# Patient Record
Sex: MALE | Race: WHITE | NOT HISPANIC OR LATINO | Employment: OTHER | URBAN - METROPOLITAN AREA
[De-identification: names, ages, dates, MRNs, and addresses within clinical notes are randomized per-mention and may not be internally consistent; named-entity substitution may affect disease eponyms.]

---

## 2020-09-16 ENCOUNTER — HOSPITAL ENCOUNTER (INPATIENT)
Facility: CLINIC | Age: 70
LOS: 8 days | Discharge: NURSING, REHAB OR LONG-TERM CARE | DRG: 057 | End: 2020-09-25
Attending: FAMILY MEDICINE | Admitting: PSYCHIATRY & NEUROLOGY
Payer: COMMERCIAL

## 2020-09-16 ENCOUNTER — APPOINTMENT (OUTPATIENT)
Dept: GENERAL RADIOLOGY | Facility: CLINIC | Age: 70
DRG: 057 | End: 2020-09-16
Payer: COMMERCIAL

## 2020-09-16 ENCOUNTER — APPOINTMENT (OUTPATIENT)
Dept: MRI IMAGING | Facility: CLINIC | Age: 70
DRG: 057 | End: 2020-09-16
Payer: COMMERCIAL

## 2020-09-16 DIAGNOSIS — R53.1 WEAKNESS: ICD-10-CM

## 2020-09-16 DIAGNOSIS — G95.89 MYELOMALACIA OF CERVICAL CORD (H): ICD-10-CM

## 2020-09-16 DIAGNOSIS — R29.898 UPPER EXTREMITY WEAKNESS: ICD-10-CM

## 2020-09-16 DIAGNOSIS — G12.21 ALS (AMYOTROPHIC LATERAL SCLEROSIS) (H): Primary | ICD-10-CM

## 2020-09-16 DIAGNOSIS — E11.9 TYPE 2 DIABETES MELLITUS WITHOUT COMPLICATION, WITHOUT LONG-TERM CURRENT USE OF INSULIN (H): ICD-10-CM

## 2020-09-16 DIAGNOSIS — I10 BENIGN ESSENTIAL HYPERTENSION: ICD-10-CM

## 2020-09-16 DIAGNOSIS — Z20.828 CONTACT WITH AND (SUSPECTED) EXPOSURE TO OTHER VIRAL COMMUNICABLE DISEASES: ICD-10-CM

## 2020-09-16 DIAGNOSIS — G47.33 OSA (OBSTRUCTIVE SLEEP APNEA): ICD-10-CM

## 2020-09-16 LAB
ALBUMIN SERPL-MCNC: 3.4 G/DL (ref 3.4–5)
ALP SERPL-CCNC: 83 U/L (ref 40–150)
ALT SERPL W P-5'-P-CCNC: 38 U/L (ref 0–70)
ANION GAP SERPL CALCULATED.3IONS-SCNC: 7 MMOL/L (ref 3–14)
APTT PPP: 27 SEC (ref 22–37)
AST SERPL W P-5'-P-CCNC: 35 U/L (ref 0–45)
BASOPHILS # BLD AUTO: 0.1 10E9/L (ref 0–0.2)
BASOPHILS NFR BLD AUTO: 0.5 %
BILIRUB SERPL-MCNC: 0.6 MG/DL (ref 0.2–1.3)
BUN SERPL-MCNC: 9 MG/DL (ref 7–30)
CALCIUM SERPL-MCNC: 8.8 MG/DL (ref 8.5–10.1)
CHLORIDE SERPL-SCNC: 106 MMOL/L (ref 94–109)
CK SERPL-CCNC: 878 U/L (ref 30–300)
CO2 SERPL-SCNC: 28 MMOL/L (ref 20–32)
CREAT SERPL-MCNC: 0.64 MG/DL (ref 0.66–1.25)
CRP SERPL-MCNC: 3.5 MG/L (ref 0–8)
DIFFERENTIAL METHOD BLD: ABNORMAL
EOSINOPHIL # BLD AUTO: 0.2 10E9/L (ref 0–0.7)
EOSINOPHIL NFR BLD AUTO: 2.1 %
ERYTHROCYTE [DISTWIDTH] IN BLOOD BY AUTOMATED COUNT: 14.2 % (ref 10–15)
ERYTHROCYTE [SEDIMENTATION RATE] IN BLOOD BY WESTERGREN METHOD: 5 MM/H (ref 0–20)
FOLATE SERPL-MCNC: 11.5 NG/ML
GFR SERPL CREATININE-BSD FRML MDRD: >90 ML/MIN/{1.73_M2}
GLUCOSE SERPL-MCNC: 110 MG/DL (ref 70–99)
HCT VFR BLD AUTO: 46.9 % (ref 40–53)
HGB BLD-MCNC: 17.1 G/DL (ref 13.3–17.7)
IMM GRANULOCYTES # BLD: 0.1 10E9/L (ref 0–0.4)
IMM GRANULOCYTES NFR BLD: 0.5 %
INR PPP: 1.1 (ref 0.86–1.14)
LYMPHOCYTES # BLD AUTO: 1.9 10E9/L (ref 0.8–5.3)
LYMPHOCYTES NFR BLD AUTO: 18.7 %
MAGNESIUM SERPL-MCNC: 2.5 MG/DL (ref 1.6–2.3)
MCH RBC QN AUTO: 31.8 PG (ref 26.5–33)
MCHC RBC AUTO-ENTMCNC: 36.5 G/DL (ref 31.5–36.5)
MCV RBC AUTO: 87 FL (ref 78–100)
MONOCYTES # BLD AUTO: 1.4 10E9/L (ref 0–1.3)
MONOCYTES NFR BLD AUTO: 13.2 %
NEUTROPHILS # BLD AUTO: 6.8 10E9/L (ref 1.6–8.3)
NEUTROPHILS NFR BLD AUTO: 65 %
NRBC # BLD AUTO: 0 10*3/UL
NRBC BLD AUTO-RTO: 0 /100
NT-PROBNP SERPL-MCNC: 64 PG/ML (ref 0–900)
PHOSPHATE SERPL-MCNC: 3.4 MG/DL (ref 2.5–4.5)
PLATELET # BLD AUTO: 207 10E9/L (ref 150–450)
POTASSIUM SERPL-SCNC: 3.1 MMOL/L (ref 3.4–5.3)
PROT SERPL-MCNC: 7.5 G/DL (ref 6.8–8.8)
RBC # BLD AUTO: 5.38 10E12/L (ref 4.4–5.9)
SODIUM SERPL-SCNC: 141 MMOL/L (ref 133–144)
T4 FREE SERPL-MCNC: 0.87 NG/DL (ref 0.76–1.46)
TROPONIN I SERPL-MCNC: 0.04 UG/L (ref 0–0.04)
TSH SERPL DL<=0.005 MIU/L-ACNC: 2.69 MU/L (ref 0.4–4)
VIT B12 SERPL-MCNC: 451 PG/ML (ref 193–986)
WBC # BLD AUTO: 10.4 10E9/L (ref 4–11)

## 2020-09-16 PROCEDURE — A9585 GADOBUTROL INJECTION: HCPCS | Performed by: FAMILY MEDICINE

## 2020-09-16 PROCEDURE — 86780 TREPONEMA PALLIDUM: CPT | Performed by: STUDENT IN AN ORGANIZED HEALTH CARE EDUCATION/TRAINING PROGRAM

## 2020-09-16 PROCEDURE — 25500064 ZZH RX 255 OP 636: Performed by: FAMILY MEDICINE

## 2020-09-16 PROCEDURE — 84439 ASSAY OF FREE THYROXINE: CPT | Performed by: STUDENT IN AN ORGANIZED HEALTH CARE EDUCATION/TRAINING PROGRAM

## 2020-09-16 PROCEDURE — 93005 ELECTROCARDIOGRAM TRACING: CPT | Performed by: FAMILY MEDICINE

## 2020-09-16 PROCEDURE — C9803 HOPD COVID-19 SPEC COLLECT: HCPCS | Performed by: FAMILY MEDICINE

## 2020-09-16 PROCEDURE — 99285 EMERGENCY DEPT VISIT HI MDM: CPT | Mod: GC | Performed by: FAMILY MEDICINE

## 2020-09-16 PROCEDURE — 82607 VITAMIN B-12: CPT | Performed by: STUDENT IN AN ORGANIZED HEALTH CARE EDUCATION/TRAINING PROGRAM

## 2020-09-16 PROCEDURE — 83880 ASSAY OF NATRIURETIC PEPTIDE: CPT | Performed by: STUDENT IN AN ORGANIZED HEALTH CARE EDUCATION/TRAINING PROGRAM

## 2020-09-16 PROCEDURE — 85025 COMPLETE CBC W/AUTO DIFF WBC: CPT | Performed by: STUDENT IN AN ORGANIZED HEALTH CARE EDUCATION/TRAINING PROGRAM

## 2020-09-16 PROCEDURE — 85610 PROTHROMBIN TIME: CPT | Performed by: STUDENT IN AN ORGANIZED HEALTH CARE EDUCATION/TRAINING PROGRAM

## 2020-09-16 PROCEDURE — 84443 ASSAY THYROID STIM HORMONE: CPT | Performed by: STUDENT IN AN ORGANIZED HEALTH CARE EDUCATION/TRAINING PROGRAM

## 2020-09-16 PROCEDURE — 82550 ASSAY OF CK (CPK): CPT | Performed by: STUDENT IN AN ORGANIZED HEALTH CARE EDUCATION/TRAINING PROGRAM

## 2020-09-16 PROCEDURE — 84100 ASSAY OF PHOSPHORUS: CPT | Performed by: STUDENT IN AN ORGANIZED HEALTH CARE EDUCATION/TRAINING PROGRAM

## 2020-09-16 PROCEDURE — 86140 C-REACTIVE PROTEIN: CPT | Performed by: STUDENT IN AN ORGANIZED HEALTH CARE EDUCATION/TRAINING PROGRAM

## 2020-09-16 PROCEDURE — 83735 ASSAY OF MAGNESIUM: CPT | Performed by: STUDENT IN AN ORGANIZED HEALTH CARE EDUCATION/TRAINING PROGRAM

## 2020-09-16 PROCEDURE — 71045 X-RAY EXAM CHEST 1 VIEW: CPT

## 2020-09-16 PROCEDURE — 85730 THROMBOPLASTIN TIME PARTIAL: CPT | Performed by: STUDENT IN AN ORGANIZED HEALTH CARE EDUCATION/TRAINING PROGRAM

## 2020-09-16 PROCEDURE — 84484 ASSAY OF TROPONIN QUANT: CPT | Performed by: STUDENT IN AN ORGANIZED HEALTH CARE EDUCATION/TRAINING PROGRAM

## 2020-09-16 PROCEDURE — 82746 ASSAY OF FOLIC ACID SERUM: CPT | Performed by: STUDENT IN AN ORGANIZED HEALTH CARE EDUCATION/TRAINING PROGRAM

## 2020-09-16 PROCEDURE — 72141 MRI NECK SPINE W/O DYE: CPT

## 2020-09-16 PROCEDURE — 85652 RBC SED RATE AUTOMATED: CPT | Performed by: STUDENT IN AN ORGANIZED HEALTH CARE EDUCATION/TRAINING PROGRAM

## 2020-09-16 PROCEDURE — 80053 COMPREHEN METABOLIC PANEL: CPT | Performed by: STUDENT IN AN ORGANIZED HEALTH CARE EDUCATION/TRAINING PROGRAM

## 2020-09-16 PROCEDURE — 70553 MRI BRAIN STEM W/O & W/DYE: CPT

## 2020-09-16 PROCEDURE — 99285 EMERGENCY DEPT VISIT HI MDM: CPT | Mod: 25 | Performed by: FAMILY MEDICINE

## 2020-09-16 RX ORDER — LIDOCAINE 40 MG/G
CREAM TOPICAL
Status: DISCONTINUED | OUTPATIENT
Start: 2020-09-16 | End: 2020-09-17

## 2020-09-16 RX ORDER — GADOBUTROL 604.72 MG/ML
10 INJECTION INTRAVENOUS ONCE
Status: COMPLETED | OUTPATIENT
Start: 2020-09-16 | End: 2020-09-16

## 2020-09-16 RX ORDER — LISINOPRIL AND HYDROCHLOROTHIAZIDE 12.5; 2 MG/1; MG/1
1 TABLET ORAL
Status: ON HOLD | COMMUNITY
Start: 2019-07-08 | End: 2020-09-25

## 2020-09-16 RX ADMIN — GADOBUTROL 10 ML: 604.72 INJECTION INTRAVENOUS at 18:45

## 2020-09-16 ASSESSMENT — ENCOUNTER SYMPTOMS
ARTHRALGIAS: 0
DIFFICULTY URINATING: 0
WEAKNESS: 1
FEVER: 0
BACK PAIN: 0
DIZZINESS: 0
NUMBNESS: 0
HEADACHES: 0
MYALGIAS: 0
CHEST TIGHTNESS: 0
NECK STIFFNESS: 0
TROUBLE SWALLOWING: 0
TREMORS: 0
LIGHT-HEADEDNESS: 0
NECK PAIN: 0
SHORTNESS OF BREATH: 1
VOICE CHANGE: 0
DIARRHEA: 0
CHILLS: 0
ABDOMINAL PAIN: 0
JOINT SWELLING: 0
CONSTIPATION: 0

## 2020-09-16 NOTE — ED PROVIDER NOTES
ED Provider Note  [facility]      History     Chief Complaint   Patient presents with     Musculoskeletal Problem     unable to move BUEs progressing over past 2 years; R weaker than L, edema noted BUE. Hand  weak, unable to flex elbows. Pt says he was eval'd for this once and told it is arthritis.     HPI  John C Dunphy is a 69 year old male who presents with progressive bilateral upper extremity weakness.    Weakness started 2 years ago in right upper extremity. Per chart review, patient was evaluated for right arm weakness 09/08/2018.  X-ray shoulder at that time with mild degenerative changes.     Today, patient reports that weakness has become progressively worse. He is now unable to perform ADLs. Patient does not wear clothing at home because it is too difficult to get dressed. He does not bathe because he can't use his arms to wash himself and it is too difficult to get out of the bathtub. He cannot use his hands to eat or prepare food and will eat by bringing his face to the table. He has been quite resourceful in accommodating for the loss of hand/arm use over the past 2 years and presents today because his neighbor was concerned about his ability to continue caring for himself. He generally does not leave his home other than to get the mail, and has not seen a primary care physician since his insurance changed over a year ago.    Patient reports that he has never had any numbness, tingling, pain or loss of sensation. He does note swelling in his upper extremities. He denies any weakness in lower extremities. He has no difficulty controlling his bowel or bladder. He denies difficulty swallowing or changes in voice quality. He has felt SOB recently but notes that this may be secondary to inactivity. He denies tremor or fasciculations. No history of neck injury or trauma.  No known family history of motor neuron disease.     Past Medical History  Past Medical History:  "  Diagnosis Date     Hypertension      History reviewed. No pertinent surgical history.  No current outpatient medications on file.    No Known Allergies  Family History  History reviewed. No pertinent family history.  Social History   Social History     Tobacco Use     Smoking status: None   Substance Use Topics     Alcohol use: None     Drug use: None      Past medical history, past surgical history, medications, allergies, family history, and social history were reviewed with the patient. No additional pertinent items.       Review of Systems   Constitutional: Positive for activity change (unable to perform simple ADLs). Negative for chills and fever.   HENT: Negative for trouble swallowing and voice change.    Eyes: Negative for visual disturbance.   Respiratory: Positive for shortness of breath. Negative for chest tightness.    Cardiovascular: Positive for leg swelling. Negative for chest pain.   Gastrointestinal: Negative for abdominal pain, constipation, diarrhea and nausea.   Genitourinary: Negative for difficulty urinating and flank pain.   Musculoskeletal: Negative for arthralgias, back pain, gait problem, joint swelling, myalgias, neck pain and neck stiffness.   Skin: Negative for rash.   Allergic/Immunologic: Negative for immunocompromised state.   Neurological: Positive for weakness. Negative for dizziness, tremors, light-headedness, numbness and headaches.   Hematological: Does not bruise/bleed easily.   Psychiatric/Behavioral: Positive for decreased concentration. Negative for confusion.   All other systems reviewed and are negative.    A complete review of systems was performed with pertinent positives and negatives noted in the HPI, and all other systems negative.    Physical Exam   BP: (!) 154/98  Pulse: 89  Temp: 97.7  F (36.5  C)  Resp: 16  Height: 167.6 cm (5' 6\")  Weight: 98.4 kg (216 lb 14.4 oz)  SpO2: 95 %  Physical Exam  Vitals signs and nursing note reviewed.   Constitutional:       " General: He is in acute distress.      Appearance: He is well-developed. He is not toxic-appearing or diaphoretic.   HENT:      Head: Normocephalic and atraumatic.      Mouth/Throat:      Mouth: Mucous membranes are moist.   Eyes:      General: No scleral icterus.     Extraocular Movements: Extraocular movements intact.      Conjunctiva/sclera: Conjunctivae normal.      Pupils: Pupils are equal, round, and reactive to light.   Neck:      Musculoskeletal: Normal range of motion and neck supple. No neck rigidity or muscular tenderness.      Comments: nontenderness    Cardiovascular:      Rate and Rhythm: Normal rate and regular rhythm.      Pulses: Normal pulses.      Heart sounds: Normal heart sounds.   Pulmonary:      Effort: Pulmonary effort is normal. No respiratory distress.      Breath sounds: Normal breath sounds. No stridor. No rales.   Abdominal:      General: There is no distension.      Tenderness: There is no abdominal tenderness.   Musculoskeletal:         General: Swelling present. No tenderness.      Right shoulder: He exhibits decreased range of motion, swelling and decreased strength. He exhibits no tenderness and no pain.      Right lower leg: Edema present.      Left lower leg: Edema present.      Comments: Arms swollen bilaterally. R>L.   Skin:     General: Skin is warm and dry.      Capillary Refill: Capillary refill takes less than 2 seconds.      Coloration: Skin is pale.      Findings: No rash.   Neurological:      Mental Status: He is alert and oriented to person, place, and time.      Cranial Nerves: Cranial nerves are intact.      Sensory: Sensory deficit present.      Motor: Weakness present.      Coordination: Coordination normal.      Gait: Gait normal.      Deep Tendon Reflexes: Reflexes abnormal. Babinski sign absent on the right side. Babinski sign absent on the left side.      Comments: Strength 2/5 bilaterally on arm abduction. 5/5 bilaterally shoulder shrug. Unable to flex elbows.  Unable to extend right wrist. Unable to flex left wrist. Muscle tone increased and limits passive range of motion of shoulders bilaterally. Unable to elicit reflexes. Vibratory sensation diminished below the knee bilaterally. Sensation to pain intact and equal in all 4 extremities.   Psychiatric:      Comments: Flat anxious but appropriate       ED Course      Procedures         Patient value in the ER.  As noted right greater than left upper extremity weakness patient has no neck pain or back pain no bowel or bladder problems etc.  His gait is still intact.  Labs noted  EKG done with some inf lateral st changes.  Trop wnl and bnp etc.    Other markers stable.  Patient transferred to [place] ED for neuro consult and possible neurosurgery consult based on prelim mri report.           Results for orders placed or performed during the hospital encounter of 09/16/20   XR Chest Port 1 View     Status: None    Narrative    CHEST PORTABLE ONE VIEW   9/16/2020 6:11 PM     HISTORY: Short of breath.    COMPARISON: None.      Impression    IMPRESSION: Portable chest. Lungs are clear. Heart is normal in size.  No pneumothorax. No definite pleural effusions.    JUN MONTERO MD   MR Brain w/o & w Contrast     Status: None    Narrative    MRI BRAIN WITHOUT and with IV contrast. CONTRAST  9/16/2020 7:35 PM    HISTORY:  Neuro deficit(s), subacute    TECHNIQUE:  Multiplanar, multisequence MRI of the brain with 7.5 cc  gadolinium IV contrast material.    COMPARISON:  None.    FINDINGS:  There is generalized atrophy of the brain. White matter  changes are seen in the cerebral hemispheres consistent with sequelae  of small vessel ischemic disease. There is no evidence of hemorrhage,  mass, acute infarct, or anomaly.     The facial structures appear normal. The arteries at the base of the  brain and the dural venous sinuses appear patent. Paranasal sinus  mucosal thickening. No abnormal enhancement of the brain parenchyma  or  meninges.  Inflammatory fluid throughout several left-sided mastoid air cells.  Region of the sella and suprasellar cistern are clear.      Impression    IMPRESSION:    1. Evidence of chronic small vessel change throughout the deep white  matter of both cerebral hemispheres with no acute intracranial  findings.      LAMONTE BRYAN MD   MR Cervical Spine w/o Contrast     Status: None    Narrative    MRI OF THE CERVICAL SPINE WITHOUT CONTRAST 9/16/2020 7:13 PM    COMPARISON: None    HISTORY: Progressive bilateral upper extremity weakness.    TECHNIQUE: Multiplanar, multisequence MRI images of the cervical spine  were acquired without intravenous contrast.    FINDINGS: There is normal alignment of the cervical vertebrae;  however, there is straightening of normal cervical lordosis. Vertebral  body heights of the cervical spine are normal. Marrow signal  throughout the cervical vertebrae is within normal limits. There is no  evidence for fracture or pathologic bony lesion of the cervical spine.      There is loss of disc height, disc desiccation and posterior disc  bulging to varying degrees at all levels of the cervical spine.      The cervical spinal cord is mildly deformed by degenerative changes at  the C4-C5 level. The cervical spinal cord is otherwise normal in  contour. There is abnormal T2 signal hyperintensity in the cervical  spinal cord at the C4-C5 level likely representing myelomalacia due to  chronic impingement. There is no other abnormal signal in the cervical  spinal cord.    Level by level:    C2-C3: There is a small posterior central disc herniation  (protrusion). Minimal facet arthropathy bilaterally. The herniated  disc material mildly indents the anterior central aspect of the  cervical spinal cord, but overall spinal canal narrowing is minimal.  There is no foraminal narrowing on either side.    C3-C4: There is facet arthropathy bilaterally, uncinate hypertrophy  bilaterally and a posterior  broad-based disc-osteophyte complex with a  superimposed moderate-sized posterior central disc herniation  (protrusion). The herniated disc material mildly indents the anterior  central aspect of the cervical spinal cord. Overall spinal canal  narrowing is mild. Moderate left foraminal stenosis. Mild right  foraminal narrowing.    C4-C5: There is facet arthropathy bilaterally, uncinate hypertrophy  bilaterally and a posterior broad-based disc-osteophyte complex with a  superimposed moderate-sized posterior central disc herniation  (protrusion). The herniated disc material moderately indents the  anterior central aspect of the cervical spinal cord. Abnormal T2  signal hyperintensity consistent with myelomalacia is noted in the  right anterolateral aspect of the cervical spinal cord. Moderate  spinal canal stenosis. Moderate bilateral neural foraminal stenosis.    C5-C6: There is facet arthropathy bilaterally, uncinate hypertrophy  bilaterally and a posterior broad-based disc-osteophyte complex.  Minimal spinal canal narrowing. Moderate left foraminal stenosis.  Minimal right foraminal narrowing.    C6-C7: There is facet arthropathy bilaterally, uncinate hypertrophy on  the left and a posterior broad-based disc-osteophyte complex. Minimal  spinal canal narrowing. Severe left foraminal stenosis. No right  foraminal stenosis.    C7-T1: There is facet arthropathy bilaterally, uncinate hypertrophy on  the left and a posterior broad-based disc-osteophyte complex. There is  no spinal canal or neural foraminal stenosis at this level.      Impression    IMPRESSION:  1. Severe degenerative changes of the cervical spine as detailed  above.  2. Deformation of the spinal cord by posterior disc herniations at the  C2-C3, C3-C4 and C4-C5 levels as detailed above. Small focus of  presumed myelomalacia in the right anterolateral aspect of the  cervical spinal cord at the C4-C5 level likely due to chronic  impingement.  3. Moderate to  severe neural foraminal stenosis on the left at C3-C4,  bilaterally at C4-C5, on the left at C5-C6 and on the left at C6-C7.    NICHOLAS JOHN MD   CBC with platelets differential     Status: Abnormal   Result Value Ref Range    WBC 10.4 4.0 - 11.0 10e9/L    RBC Count 5.38 4.4 - 5.9 10e12/L    Hemoglobin 17.1 13.3 - 17.7 g/dL    Hematocrit 46.9 40.0 - 53.0 %    MCV 87 78 - 100 fl    MCH 31.8 26.5 - 33.0 pg    MCHC 36.5 31.5 - 36.5 g/dL    RDW 14.2 10.0 - 15.0 %    Platelet Count 207 150 - 450 10e9/L    Diff Method Automated Method     % Neutrophils 65.0 %    % Lymphocytes 18.7 %    % Monocytes 13.2 %    % Eosinophils 2.1 %    % Basophils 0.5 %    % Immature Granulocytes 0.5 %    Nucleated RBCs 0 0 /100    Absolute Neutrophil 6.8 1.6 - 8.3 10e9/L    Absolute Lymphocytes 1.9 0.8 - 5.3 10e9/L    Absolute Monocytes 1.4 (H) 0.0 - 1.3 10e9/L    Absolute Eosinophils 0.2 0.0 - 0.7 10e9/L    Absolute Basophils 0.1 0.0 - 0.2 10e9/L    Abs Immature Granulocytes 0.1 0 - 0.4 10e9/L    Absolute Nucleated RBC 0.0    Erythrocyte sedimentation rate auto     Status: None   Result Value Ref Range    Sed Rate 5 0 - 20 mm/h   CRP inflammation     Status: None   Result Value Ref Range    CRP Inflammation 3.5 0.0 - 8.0 mg/L   INR     Status: None   Result Value Ref Range    INR 1.10 0.86 - 1.14   Partial thromboplastin time     Status: None   Result Value Ref Range    PTT 27 22 - 37 sec   Comprehensive metabolic panel     Status: Abnormal   Result Value Ref Range    Sodium 141 133 - 144 mmol/L    Potassium 3.1 (L) 3.4 - 5.3 mmol/L    Chloride 106 94 - 109 mmol/L    Carbon Dioxide 28 20 - 32 mmol/L    Anion Gap 7 3 - 14 mmol/L    Glucose 110 (H) 70 - 99 mg/dL    Urea Nitrogen 9 7 - 30 mg/dL    Creatinine 0.64 (L) 0.66 - 1.25 mg/dL    GFR Estimate >90 >60 mL/min/[1.73_m2]    GFR Estimate If Black >90 >60 mL/min/[1.73_m2]    Calcium 8.8 8.5 - 10.1 mg/dL    Bilirubin Total 0.6 0.2 - 1.3 mg/dL    Albumin 3.4 3.4 - 5.0 g/dL    Protein Total 7.5  6.8 - 8.8 g/dL    Alkaline Phosphatase 83 40 - 150 U/L    ALT 38 0 - 70 U/L    AST 35 0 - 45 U/L   Magnesium     Status: Abnormal   Result Value Ref Range    Magnesium 2.5 (H) 1.6 - 2.3 mg/dL   Phosphorus     Status: None   Result Value Ref Range    Phosphorus 3.4 2.5 - 4.5 mg/dL   Troponin I     Status: None   Result Value Ref Range    Troponin I ES 0.045 0.000 - 0.045 ug/L   Nt probnp inpatient (BNP)     Status: None   Result Value Ref Range    N-Terminal Pro BNP Inpatient 64 0 - 900 pg/mL   TSH     Status: None   Result Value Ref Range    TSH 2.69 0.40 - 4.00 mU/L   CK total     Status: Abnormal   Result Value Ref Range    CK Total 878 (H) 30 - 300 U/L   Folate     Status: None   Result Value Ref Range    Folate 11.5 >5.4 ng/mL   T4 free     Status: None   Result Value Ref Range    T4 Free 0.87 0.76 - 1.46 ng/dL   Treponema Abs w Reflex to RPR and Titer     Status: None   Result Value Ref Range    Treponema Antibodies Nonreactive NR^Nonreactive   Vitamin B12     Status: None   Result Value Ref Range    Vitamin B12 451 193 - 986 pg/mL   UA with Microscopic     Status: Abnormal   Result Value Ref Range    Color Urine Yellow     Appearance Urine Clear     Glucose Urine Negative NEG^Negative mg/dL    Bilirubin Urine Negative NEG^Negative    Ketones Urine Negative NEG^Negative mg/dL    Specific Gravity Urine 1.025 1.003 - 1.035    Blood Urine Negative NEG^Negative    pH Urine 6.0 5.0 - 7.0 pH    Protein Albumin Urine 10 (A) NEG^Negative mg/dL    Urobilinogen mg/dL 2.0 0.0 - 2.0 mg/dL    Nitrite Urine Negative NEG^Negative    Leukocyte Esterase Urine Negative NEG^Negative    Source Clean catch urine     WBC Urine 1 0 - 5 /HPF    RBC Urine 1 0 - 2 /HPF    Transitional Epi <1 0 - 1 /HPF    Mucous Urine Present (A) NEG^Negative /LPF   EKG 12-lead, tracing only     Status: None   Result Value Ref Range    Interpretation ECG Click View Image link to view waveform and result      Medications   lisinopril-hydrochlorothiazide  (ZESTORETIC) 20-12.5 mg combo dose ( Oral Given 9/17/20 6440)   potassium chloride ER (KLOR-CON M) CR tablet 20 mEq (has no administration in time range)   lidocaine 1 % 0.1-1 mL (has no administration in time range)   lidocaine (LMX4) cream (has no administration in time range)   sodium chloride (PF) 0.9% PF flush 3 mL (has no administration in time range)   sodium chloride (PF) 0.9% PF flush 3 mL (3 mLs Intracatheter Not Given 9/17/20 0358)   acetaminophen (TYLENOL) tablet 650 mg (has no administration in time range)   ondansetron (ZOFRAN-ODT) ODT tab 4 mg (has no administration in time range)     Or   ondansetron (ZOFRAN) injection 4 mg (has no administration in time range)   enoxaparin ANTICOAGULANT (LOVENOX) injection 40 mg (has no administration in time range)   gadobutrol (GADAVIST) injection 10 mL (10 mLs Intravenous Given 9/16/20 3415)   0.9% sodium chloride BOLUS (1,000 mLs Intravenous New Bag 9/17/20 0242)        Assessments & Plan (with Medical Decision Making)   John C Dunphy is a 69 year old male who presents with progressive bilateral upper extremity weakness. Patient's symptoms appear to be entirely motor. He denies any pain, numbness, tingling. Vital signs unconcerning. Physical exam notable for upper extremity weakness. Laboratory workup notable for elevated CK. Inflammatory markers unremarkable. Spoke with neurology who agreed that patient would benefit from hospital admission and neurology evaluation as he is quite debilitated. Also recommended MRI brain, MRI cervical spine. Patient received both prior to transfer. MRI spine with severe degenerative changes of the cervical spine, deformation of the spinal cord by disc herniations. Given this, patient may also benefit from neurosurgery evaluation.    I have reviewed the nursing notes. I have reviewed the findings, diagnosis, plan and need for follow up with the patient.    Current Discharge Medication List          Final diagnoses:   Myelomalacia  of cervical cord (H)   Upper extremity weakness     Shannon Maria MD  --  Sanjay Simmons  This data collected with the Resident working in the Emergency Department.  Patient was seen and evaluated by myself and I repeated the history and physical exam with the patient.  The plan of care was discussed with them.  The key portions of the note including the entire assessment and plan reflect my documentation.      This note was created at least in part by the use of dragon voice dictation system. Inadvertent typographical errors may still exist.  Sanjay Simmons MD.    Patient evaluated in the emergency department during the COVID-19 pandemic period. Careful attention to patients safety was addressed throughout the evaluation. Evaluation and treatment management was initiated with disposition made efficiently and appropriate as possible to minimize any risk of potential exposure to patient during this evaluation.    [facility], [place], EMERGENCY DEPARTMENT  9/16/2020     Sanjay Simmons MD  09/17/20 1107

## 2020-09-17 ENCOUNTER — APPOINTMENT (OUTPATIENT)
Dept: SPEECH THERAPY | Facility: CLINIC | Age: 70
DRG: 057 | End: 2020-09-17
Attending: COUNSELOR
Payer: COMMERCIAL

## 2020-09-17 ENCOUNTER — APPOINTMENT (OUTPATIENT)
Dept: OCCUPATIONAL THERAPY | Facility: CLINIC | Age: 70
DRG: 057 | End: 2020-09-17
Attending: STUDENT IN AN ORGANIZED HEALTH CARE EDUCATION/TRAINING PROGRAM
Payer: COMMERCIAL

## 2020-09-17 PROBLEM — R53.1 WEAKNESS: Status: ACTIVE | Noted: 2020-09-17

## 2020-09-17 LAB
ALBUMIN UR-MCNC: 10 MG/DL
APPEARANCE UR: CLEAR
BILIRUB UR QL STRIP: NEGATIVE
COLOR UR AUTO: YELLOW
GLUCOSE UR STRIP-MCNC: NEGATIVE MG/DL
HGB UR QL STRIP: NEGATIVE
INR PPP: 1.09 (ref 0.86–1.14)
INTERPRETATION ECG - MUSE: NORMAL
KETONES UR STRIP-MCNC: NEGATIVE MG/DL
LEUKOCYTE ESTERASE UR QL STRIP: NEGATIVE
MUCOUS THREADS #/AREA URNS LPF: PRESENT /LPF
NITRATE UR QL: NEGATIVE
PH UR STRIP: 6 PH (ref 5–7)
RBC #/AREA URNS AUTO: 1 /HPF (ref 0–2)
SOURCE: ABNORMAL
SP GR UR STRIP: 1.02 (ref 1–1.03)
T PALLIDUM AB SER QL: NONREACTIVE
TRANS CELLS #/AREA URNS HPF: <1 /HPF (ref 0–1)
UROBILINOGEN UR STRIP-MCNC: 2 MG/DL (ref 0–2)
WBC #/AREA URNS AUTO: 1 /HPF (ref 0–5)

## 2020-09-17 PROCEDURE — 25000128 H RX IP 250 OP 636: Performed by: STUDENT IN AN ORGANIZED HEALTH CARE EDUCATION/TRAINING PROGRAM

## 2020-09-17 PROCEDURE — 36415 COLL VENOUS BLD VENIPUNCTURE: CPT | Performed by: STUDENT IN AN ORGANIZED HEALTH CARE EDUCATION/TRAINING PROGRAM

## 2020-09-17 PROCEDURE — U0003 INFECTIOUS AGENT DETECTION BY NUCLEIC ACID (DNA OR RNA); SEVERE ACUTE RESPIRATORY SYNDROME CORONAVIRUS 2 (SARS-COV-2) (CORONAVIRUS DISEASE [COVID-19]), AMPLIFIED PROBE TECHNIQUE, MAKING USE OF HIGH THROUGHPUT TECHNOLOGIES AS DESCRIBED BY CMS-2020-01-R: HCPCS | Performed by: EMERGENCY MEDICINE

## 2020-09-17 PROCEDURE — 85610 PROTHROMBIN TIME: CPT | Performed by: STUDENT IN AN ORGANIZED HEALTH CARE EDUCATION/TRAINING PROGRAM

## 2020-09-17 PROCEDURE — 94660 CPAP INITIATION&MGMT: CPT

## 2020-09-17 PROCEDURE — 81001 URINALYSIS AUTO W/SCOPE: CPT | Performed by: STUDENT IN AN ORGANIZED HEALTH CARE EDUCATION/TRAINING PROGRAM

## 2020-09-17 PROCEDURE — 92526 ORAL FUNCTION THERAPY: CPT | Mod: GN

## 2020-09-17 PROCEDURE — 96360 HYDRATION IV INFUSION INIT: CPT | Mod: 59 | Performed by: FAMILY MEDICINE

## 2020-09-17 PROCEDURE — 40000275 ZZH STATISTIC RCP TIME EA 10 MIN

## 2020-09-17 PROCEDURE — 25800030 ZZH RX IP 258 OP 636: Performed by: EMERGENCY MEDICINE

## 2020-09-17 PROCEDURE — 12000001 ZZH R&B MED SURG/OB UMMC

## 2020-09-17 PROCEDURE — 97165 OT EVAL LOW COMPLEX 30 MIN: CPT | Mod: GO

## 2020-09-17 PROCEDURE — 97535 SELF CARE MNGMENT TRAINING: CPT | Mod: GO

## 2020-09-17 PROCEDURE — 25000132 ZZH RX MED GY IP 250 OP 250 PS 637: Performed by: STUDENT IN AN ORGANIZED HEALTH CARE EDUCATION/TRAINING PROGRAM

## 2020-09-17 PROCEDURE — 40000556 ZZH STATISTIC PERIPHERAL IV START W US GUIDANCE

## 2020-09-17 PROCEDURE — 92610 EVALUATE SWALLOWING FUNCTION: CPT | Mod: GN

## 2020-09-17 RX ORDER — FUROSEMIDE 20 MG
20 TABLET ORAL
Status: DISCONTINUED | OUTPATIENT
Start: 2020-09-18 | End: 2020-09-17

## 2020-09-17 RX ORDER — ONDANSETRON 2 MG/ML
4 INJECTION INTRAMUSCULAR; INTRAVENOUS EVERY 6 HOURS PRN
Status: DISCONTINUED | OUTPATIENT
Start: 2020-09-17 | End: 2020-09-25 | Stop reason: HOSPADM

## 2020-09-17 RX ORDER — LANOLIN ALCOHOL/MO/W.PET/CERES
3 CREAM (GRAM) TOPICAL
Status: DISCONTINUED | OUTPATIENT
Start: 2020-09-17 | End: 2020-09-25 | Stop reason: HOSPADM

## 2020-09-17 RX ORDER — POTASSIUM CHLORIDE 750 MG/1
20 TABLET, EXTENDED RELEASE ORAL ONCE
Status: COMPLETED | OUTPATIENT
Start: 2020-09-17 | End: 2020-09-17

## 2020-09-17 RX ORDER — LOSARTAN POTASSIUM 50 MG/1
50 TABLET ORAL DAILY
Status: DISCONTINUED | OUTPATIENT
Start: 2020-09-17 | End: 2020-09-17

## 2020-09-17 RX ORDER — LIDOCAINE 40 MG/G
CREAM TOPICAL
Status: DISCONTINUED | OUTPATIENT
Start: 2020-09-17 | End: 2020-09-25 | Stop reason: HOSPADM

## 2020-09-17 RX ORDER — ACETAMINOPHEN 325 MG/1
650 TABLET ORAL EVERY 4 HOURS PRN
Status: DISCONTINUED | OUTPATIENT
Start: 2020-09-17 | End: 2020-09-25 | Stop reason: HOSPADM

## 2020-09-17 RX ORDER — ONDANSETRON 4 MG/1
4 TABLET, ORALLY DISINTEGRATING ORAL EVERY 6 HOURS PRN
Status: DISCONTINUED | OUTPATIENT
Start: 2020-09-17 | End: 2020-09-25 | Stop reason: HOSPADM

## 2020-09-17 RX ORDER — PRASUGREL 10 MG/1
10 TABLET, FILM COATED ORAL DAILY
Status: DISCONTINUED | OUTPATIENT
Start: 2020-09-17 | End: 2020-09-17

## 2020-09-17 RX ORDER — LORAZEPAM 0.5 MG/1
0.5 TABLET ORAL EVERY 6 HOURS PRN
Status: DISCONTINUED | OUTPATIENT
Start: 2020-09-17 | End: 2020-09-17

## 2020-09-17 RX ORDER — PANTOPRAZOLE SODIUM 40 MG/1
40 TABLET, DELAYED RELEASE ORAL
Status: DISCONTINUED | OUTPATIENT
Start: 2020-09-18 | End: 2020-09-17

## 2020-09-17 RX ORDER — ASPIRIN 81 MG/1
81 TABLET ORAL DAILY
Status: DISCONTINUED | OUTPATIENT
Start: 2020-09-17 | End: 2020-09-17

## 2020-09-17 RX ORDER — CARVEDILOL 6.25 MG/1
6.25 TABLET ORAL 2 TIMES DAILY WITH MEALS
Status: DISCONTINUED | OUTPATIENT
Start: 2020-09-17 | End: 2020-09-17

## 2020-09-17 RX ADMIN — HYDROCHLOROTHIAZIDE: 12.5 CAPSULE ORAL at 08:40

## 2020-09-17 RX ADMIN — ENOXAPARIN SODIUM 40 MG: 40 INJECTION SUBCUTANEOUS at 11:21

## 2020-09-17 RX ADMIN — POTASSIUM CHLORIDE 20 MEQ: 750 TABLET, EXTENDED RELEASE ORAL at 11:22

## 2020-09-17 RX ADMIN — SODIUM CHLORIDE 1000 ML: 9 INJECTION, SOLUTION INTRAVENOUS at 02:42

## 2020-09-17 ASSESSMENT — MIFFLIN-ST. JEOR: SCORE: 1686.6

## 2020-09-17 ASSESSMENT — ACTIVITIES OF DAILY LIVING (ADL)
ADLS_ACUITY_SCORE: 20
COGNITION: 0 - NO COGNITION ISSUES REPORTED
FALL_HISTORY_WITHIN_LAST_SIX_MONTHS: NO
BATHING: 0-->INDEPENDENT
ADLS_ACUITY_SCORE: 18
DRESS: 0-->INDEPENDENT
RETIRED_EATING: 2-->ASSISTIVE PERSON
TRANSFERRING: 0-->INDEPENDENT
ADLS_ACUITY_SCORE: 17
TOILETING: 0-->INDEPENDENT
WHICH_OF_THE_ABOVE_FUNCTIONAL_RISKS_HAD_A_RECENT_ONSET_OR_CHANGE?: AMBULATION;TRANSFERRING;TOILETING;BATHING;DRESSING;EATING
SWALLOWING: 0-->SWALLOWS FOODS/LIQUIDS WITHOUT DIFFICULTY
ADLS_ACUITY_SCORE: 19
RETIRED_COMMUNICATION: 0-->UNDERSTANDS/COMMUNICATES WITHOUT DIFFICULTY
ADLS_ACUITY_SCORE: 19
AMBULATION: 0-->INDEPENDENT

## 2020-09-17 ASSESSMENT — ENCOUNTER SYMPTOMS
DECREASED CONCENTRATION: 1
BRUISES/BLEEDS EASILY: 0
FLANK PAIN: 0
ACTIVITY CHANGE: 1
NAUSEA: 0
CONFUSION: 0

## 2020-09-17 ASSESSMENT — VISUAL ACUITY
OU: NORMAL ACUITY;GLASSES

## 2020-09-17 NOTE — PLAN OF CARE
Status: Pt admitted for evaluation of bilateral UE weakness since 2018. Pt witht h/o RAKEL on CPAP, HTN, hypokalemia   Vitals: VSS HTN within parameters. on 2L oxymask, pt napping in between cares.   Neuros: AxOx4. RUE weaker than LUE. RUE 2/5, LUE 3/5, BLE 5/5. Slight N/T to BLE at baseline.   IV: PIV SL.   Resp/trach: LS clear. Uses CPAP at home for sleep apnea.   Diet: Regular diet. Pt needs 1:1 assistance w/ ordering and eating.   Bowel status: BS+. No BM this shift. LBM 9/16 per pt report.   : Voiding spontaneously in bedside urinal. Some post retention after voiding, PMR <200 this shift.   Skin: RUE armpit with blanchable redness. Bilateral groin and scrotum with blanchable erythema.   Pain: Denied pain.   Activity: Pt up A2 and GB. Pt reports feeling more unsteady not normal over the past few day. Plan: neuropsych evaluation and EMG. Continue to monitor and follow POC.

## 2020-09-17 NOTE — H&P
[facility]: [place]  Neurology History and Physical    Patient Name:  John C Dunphy  MRN:  [id number]    :  1950  Date of Admission:  2020  Date of Service:  2020  Primary care provider:  No Ref-Primary, Physician      Chief Complaint:  Bilateral UE weakness    History of Present Illness:   69 year old male h/o RAKEL on CPAP, HTN, hypokalemia who presents with bilateral UE weakness.   The weakness started in the right UE in the summer of 2018. He noticed that he can't reach the top of his head. This weakness progressively got worse to the point that he is no longer able to lift his arm at all. His hand  and the flexion and extension of the arm were also progressively weakened and now he is not able to write or feed himself. The left UE started a year ago and is progressively getting weaker. Still better than the right UE but is significantly weak. He is not able to coomb his hair or brush his teeth or feed himself. He eats by his mouth from the plate with very little help from the left hand.   He denies weakness to his legs but he is very cautious walking or walking up or down stairs. He is barely go outside in the past year. He noticed that his walking is very slow and steps are short.   He has twitches of the muscles of the front of the thighs bilaterally but did not notice any twitches in the back or arms.  He denies any sensory changes to the arms but endorsed numbness of the bottom of both feet.     He fell downstairs in 2018 but he does not remember whether this happened before or after the weakness.     He denies urinary incontinence or retention or diarrhea or constipation. He denies any swallowing difficulty, blurry vision, double vision, change in the facial sensation, facial droop, balance issues or headache or memory issues.     Patient was admitted because he is no longer able take care of himself.     ROS: A 10-point ROS was performed as per  "HPI.   PMH:  Past Medical History:   Diagnosis Date     Hypertension      History reviewed. No pertinent surgical history.    Allergies:  No Known Allergies    Medications:      Current Facility-Administered Medications:      0.9% sodium chloride BOLUS, 1,000 mL, Intravenous, Once, Janeen العلي MD     lidocaine (LMX4) cream, , Topical, Q1H PRN, Shannon Maria MD     lidocaine 1 % 0.1-1 mL, 0.1-1 mL, Other, Q1H PRN, Shannon Maria MD     sodium chloride (PF) 0.9% PF flush 3 mL, 3 mL, Intracatheter, q1 min prn, Shannon Maria MD     sodium chloride (PF) 0.9% PF flush 3 mL, 3 mL, Intracatheter, Q8H, Shannon Maria MD    Current Outpatient Medications:      lisinopril-hydrochlorothiazide (ZESTORETIC) 20-12.5 MG tablet, Take 1 tablet by mouth, Disp: , Rfl:     Social History:  Social History     Tobacco Use     Smoking status: Not on file   Substance Use Topics     Alcohol use: Not on file       Family History:    History reviewed. No pertinent family history.    Physical Examination:   Vitals: BP (!) 154/98   Pulse 89   Temp 97.7  F (36.5  C) (Oral)   Resp 16   SpO2 95%   General: Adult male patient, lying in bed, NAD. Disheveled   HEENT: normocephalic  Cardiac: RRR  Chest: non-labored on RA  Abdomen: Soft  Extremities: Warm, trace edema in the right hand  Skin: No rash or lesion   Psych: Mood pleasant, affect congruent  Neuro:  Mental status: Awake, alert, attentive, oriented to self, time, place, and circumstance. Language is fluent and coherent with intact comprehension of complex commands, naming and repetition. Was able to say the days of the week backward. Remembered 3 /3 words. Able to tell that 1.5 dollars has 6 quarters. I asked him \"if you were to draw a clock that read 10 past 11, what will you draw?\". He replied (I will draw a rounded disc with 12 numbers in an ascending order, the highest in the top and the lowest to the right of the highest number. Two hands, the short one " "pointing to number 11 and the long hand to number 2). I asked if he found a letter with no stamp but the addresses of the sender and the recipient are on, what will he do with the letter?, he replied \"I will put a stamp and send the letter\".    Cranial nerves: VFF, PERRL, conjugate gaze, EOMI, facial sensation intact, face symmetric, shoulder shrug strong, tongue/uvula midline, no dysarthria.   Motor: hypotonia of both UEs, RT>LT. No abnormal movements.    Right Left   Neck flexion 5 5   Neck extension: 5 5   Shoulder abduction:  0 3   Elbow extension: 2 3   Elbow flexion:  0 2+   Wrist flexion:  3 4   Wrist extension:  0 3   Finger flexion 2 4   FDI 0 1   Hip flexion 5 5   Hip extension 5 5   Knee flexion 5 5   Knee extension 5 5   Dorsiflexion 5 5   Planter flexion 5 5     Reflexes: areflexic and symmetric biceps, brachioradialis, triceps. 2+ reflexes in patellae, and achilles bilaterally. Toes mute.  Sensory: intact to light touch in all 4 extremities. Reduced pinprick in both UEs and shoulder compared to the face. Reduced to pinprick in both LE up to the level of knee??. Intact sense of position and sense of movement.  Coordination: FNF can't be done because of the UEs weakness. HS without ataxia or dysmetria.   Gait: deferred    Investigations:    MRI brain without contrast:   IMPRESSION:    1. Evidence of chronic small vessel change throughout the deep white  matter of both cerebral hemispheres with no acute intracranial  Findings.    MRI cervical spines:  IMPRESSION:  1. Severe degenerative changes of the cervical spine as detailed  above.  2. Deformation of the spinal cord by posterior disc herniations at the  C2-C3, C3-C4 and C4-C5 levels as detailed above. Small focus of  presumed myelomalacia in the right anterolateral aspect of the  cervical spinal cord at the C4-C5 level likely due to chronic  impingement.  3. Moderate to severe neural foraminal stenosis on the left at C3-C4,  bilaterally at C4-C5, on " the left at C5-C6 and on the left at C6-C7.    Labs reviewed. Hypokalemia 3.1,     Assessment and Plan:  69 year old male h/o RAKEL on CPAP, HTN, hypokalemia who presents with bilateral UE weakness since 2018. The right is weaker than the left. No LE weakness. Has reduced sensation in both UEs and LEs. Areflexia in both UEs and preserved reflexes in both LEs. MRI brain with diffuse brain atrophy. MRI cervical spines with severe degenerative changes, foraminal narrowing at multiple levels and cord compression at C4-5 level with small encephalomalacia at that level.   The C4-5 cervical disc and compression of multiple roots can explain the UE weakness but the absence of LE weakness and urinary incontinence is not typical. The weakness is severe with fasciculation and the sensory changes are not clear, this raises the possibility of motor neuron disease. The fact that he did not seek help for two years and got along with his illness in the setting of inability to eat or take care of himself suggesting cognitive dysfunction and poor judgment. The combination of weakness and the cognitive deficit raises the question of MND-FTD.     #Bilateral UE weakness:  - Consider EMG   - PT and OT evaluation  - Neurosurgery consult  - Neuropsych evaluation    # Hypertension  - continue PTA Lisinopril-hydrochlorothiazide 20-12.5 mg daily    # Chronic hypokalemia   - continue PTA potassium chloride 20 meq daily    # RAKEL   - CPAP       FEN: regular diet   Code: full code (not discussed)    Patient was discussed with MD Jesus Forbes MD  Neurology PGY-3  Pager [phone]

## 2020-09-17 NOTE — ED NOTES
Emergency Department Patient Sign-out       Brief HPI:  This is a 69 year old male signed out to me by Dr. Simmons .  See initial ED Provider note for details of the presentation.            Significant Events prior to my assuming care: [facility] with progressive upper extremity weakness over the past 2 years.  MRI of the C-spine and brain obtained.  Previous provider spoke with neurology who requested patient be transferred to the ED on the [place] for evaluation and admission.      Exam:   Patient Vitals for the past 24 hrs:   BP Temp Temp src Pulse Resp SpO2   09/17/20 0000 -- -- -- -- -- 94 %   09/16/20 2300 -- -- -- -- -- 92 %   09/16/20 1617 (!) 154/98 97.7  F (36.5  C) Oral 89 16 95 %           ED RESULTS:   Results for orders placed or performed during the hospital encounter of 09/16/20 (from the past 24 hour(s))   EKG 12-lead, tracing only     Status: None (Preliminary result)    Collection Time: 09/16/20  5:30 PM   Result Value Ref Range    Interpretation ECG Click View Image link to view waveform and result    CBC with platelets differential     Status: Abnormal    Collection Time: 09/16/20  5:45 PM   Result Value Ref Range    WBC 10.4 4.0 - 11.0 10e9/L    RBC Count 5.38 4.4 - 5.9 10e12/L    Hemoglobin 17.1 13.3 - 17.7 g/dL    Hematocrit 46.9 40.0 - 53.0 %    MCV 87 78 - 100 fl    MCH 31.8 26.5 - 33.0 pg    MCHC 36.5 31.5 - 36.5 g/dL    RDW 14.2 10.0 - 15.0 %    Platelet Count 207 150 - 450 10e9/L    Diff Method Automated Method     % Neutrophils 65.0 %    % Lymphocytes 18.7 %    % Monocytes 13.2 %    % Eosinophils 2.1 %    % Basophils 0.5 %    % Immature Granulocytes 0.5 %    Nucleated RBCs 0 0 /100    Absolute Neutrophil 6.8 1.6 - 8.3 10e9/L    Absolute Lymphocytes 1.9 0.8 - 5.3 10e9/L    Absolute Monocytes 1.4 (H) 0.0 - 1.3 10e9/L    Absolute Eosinophils 0.2 0.0 - 0.7 10e9/L    Absolute Basophils 0.1 0.0 - 0.2 10e9/L    Abs Immature Granulocytes 0.1 0 - 0.4 10e9/L    Absolute  Nucleated RBC 0.0    Erythrocyte sedimentation rate auto     Status: None    Collection Time: 09/16/20  5:45 PM   Result Value Ref Range    Sed Rate 5 0 - 20 mm/h   CRP inflammation     Status: None    Collection Time: 09/16/20  5:45 PM   Result Value Ref Range    CRP Inflammation 3.5 0.0 - 8.0 mg/L   INR     Status: None    Collection Time: 09/16/20  5:45 PM   Result Value Ref Range    INR 1.10 0.86 - 1.14   Partial thromboplastin time     Status: None    Collection Time: 09/16/20  5:45 PM   Result Value Ref Range    PTT 27 22 - 37 sec   Comprehensive metabolic panel     Status: Abnormal    Collection Time: 09/16/20  5:45 PM   Result Value Ref Range    Sodium 141 133 - 144 mmol/L    Potassium 3.1 (L) 3.4 - 5.3 mmol/L    Chloride 106 94 - 109 mmol/L    Carbon Dioxide 28 20 - 32 mmol/L    Anion Gap 7 3 - 14 mmol/L    Glucose 110 (H) 70 - 99 mg/dL    Urea Nitrogen 9 7 - 30 mg/dL    Creatinine 0.64 (L) 0.66 - 1.25 mg/dL    GFR Estimate >90 >60 mL/min/[1.73_m2]    GFR Estimate If Black >90 >60 mL/min/[1.73_m2]    Calcium 8.8 8.5 - 10.1 mg/dL    Bilirubin Total 0.6 0.2 - 1.3 mg/dL    Albumin 3.4 3.4 - 5.0 g/dL    Protein Total 7.5 6.8 - 8.8 g/dL    Alkaline Phosphatase 83 40 - 150 U/L    ALT 38 0 - 70 U/L    AST 35 0 - 45 U/L   Magnesium     Status: Abnormal    Collection Time: 09/16/20  5:45 PM   Result Value Ref Range    Magnesium 2.5 (H) 1.6 - 2.3 mg/dL   Phosphorus     Status: None    Collection Time: 09/16/20  5:45 PM   Result Value Ref Range    Phosphorus 3.4 2.5 - 4.5 mg/dL   Troponin I     Status: None    Collection Time: 09/16/20  5:45 PM   Result Value Ref Range    Troponin I ES 0.045 0.000 - 0.045 ug/L   Nt probnp inpatient (BNP)     Status: None    Collection Time: 09/16/20  5:45 PM   Result Value Ref Range    N-Terminal Pro BNP Inpatient 64 0 - 900 pg/mL   TSH     Status: None    Collection Time: 09/16/20  5:45 PM   Result Value Ref Range    TSH 2.69 0.40 - 4.00 mU/L   CK total     Status: Abnormal     Collection Time: 09/16/20  5:45 PM   Result Value Ref Range    CK Total 878 (H) 30 - 300 U/L   Folate     Status: None    Collection Time: 09/16/20  5:45 PM   Result Value Ref Range    Folate 11.5 >5.4 ng/mL   T4 free     Status: None    Collection Time: 09/16/20  5:45 PM   Result Value Ref Range    T4 Free 0.87 0.76 - 1.46 ng/dL   Vitamin B12     Status: None    Collection Time: 09/16/20  5:45 PM   Result Value Ref Range    Vitamin B12 451 193 - 986 pg/mL   XR Chest Port 1 View     Status: None    Collection Time: 09/16/20  6:11 PM    Narrative    CHEST PORTABLE ONE VIEW   9/16/2020 6:11 PM     HISTORY: Short of breath.    COMPARISON: None.      Impression    IMPRESSION: Portable chest. Lungs are clear. Heart is normal in size.  No pneumothorax. No definite pleural effusions.    JUN MONTERO MD   MR Cervical Spine w/o Contrast     Status: None    Collection Time: 09/16/20  7:13 PM    Narrative    MRI OF THE CERVICAL SPINE WITHOUT CONTRAST 9/16/2020 7:13 PM    COMPARISON: None    HISTORY: Progressive bilateral upper extremity weakness.    TECHNIQUE: Multiplanar, multisequence MRI images of the cervical spine  were acquired without intravenous contrast.    FINDINGS: There is normal alignment of the cervical vertebrae;  however, there is straightening of normal cervical lordosis. Vertebral  body heights of the cervical spine are normal. Marrow signal  throughout the cervical vertebrae is within normal limits. There is no  evidence for fracture or pathologic bony lesion of the cervical spine.      There is loss of disc height, disc desiccation and posterior disc  bulging to varying degrees at all levels of the cervical spine.      The cervical spinal cord is mildly deformed by degenerative changes at  the C4-C5 level. The cervical spinal cord is otherwise normal in  contour. There is abnormal T2 signal hyperintensity in the cervical  spinal cord at the C4-C5 level likely representing myelomalacia due to  chronic  impingement. There is no other abnormal signal in the cervical  spinal cord.    Level by level:    C2-C3: There is a small posterior central disc herniation  (protrusion). Minimal facet arthropathy bilaterally. The herniated  disc material mildly indents the anterior central aspect of the  cervical spinal cord, but overall spinal canal narrowing is minimal.  There is no foraminal narrowing on either side.    C3-C4: There is facet arthropathy bilaterally, uncinate hypertrophy  bilaterally and a posterior broad-based disc-osteophyte complex with a  superimposed moderate-sized posterior central disc herniation  (protrusion). The herniated disc material mildly indents the anterior  central aspect of the cervical spinal cord. Overall spinal canal  narrowing is mild. Moderate left foraminal stenosis. Mild right  foraminal narrowing.    C4-C5: There is facet arthropathy bilaterally, uncinate hypertrophy  bilaterally and a posterior broad-based disc-osteophyte complex with a  superimposed moderate-sized posterior central disc herniation  (protrusion). The herniated disc material moderately indents the  anterior central aspect of the cervical spinal cord. Abnormal T2  signal hyperintensity consistent with myelomalacia is noted in the  right anterolateral aspect of the cervical spinal cord. Moderate  spinal canal stenosis. Moderate bilateral neural foraminal stenosis.    C5-C6: There is facet arthropathy bilaterally, uncinate hypertrophy  bilaterally and a posterior broad-based disc-osteophyte complex.  Minimal spinal canal narrowing. Moderate left foraminal stenosis.  Minimal right foraminal narrowing.    C6-C7: There is facet arthropathy bilaterally, uncinate hypertrophy on  the left and a posterior broad-based disc-osteophyte complex. Minimal  spinal canal narrowing. Severe left foraminal stenosis. No right  foraminal stenosis.    C7-T1: There is facet arthropathy bilaterally, uncinate hypertrophy on  the left and a  posterior broad-based disc-osteophyte complex. There is  no spinal canal or neural foraminal stenosis at this level.      Impression    IMPRESSION:  1. Severe degenerative changes of the cervical spine as detailed  above.  2. Deformation of the spinal cord by posterior disc herniations at the  C2-C3, C3-C4 and C4-C5 levels as detailed above. Small focus of  presumed myelomalacia in the right anterolateral aspect of the  cervical spinal cord at the C4-C5 level likely due to chronic  impingement.  3. Moderate to severe neural foraminal stenosis on the left at C3-C4,  bilaterally at C4-C5, on the left at C5-C6 and on the left at C6-C7.    NICHOLAS JOHN MD   MR Brain w/o & w Contrast     Status: None    Collection Time: 09/16/20  7:35 PM    Narrative    MRI BRAIN WITHOUT and with IV contrast. CONTRAST  9/16/2020 7:35 PM    HISTORY:  Neuro deficit(s), subacute    TECHNIQUE:  Multiplanar, multisequence MRI of the brain with 7.5 cc  gadolinium IV contrast material.    COMPARISON:  None.    FINDINGS:  There is generalized atrophy of the brain. White matter  changes are seen in the cerebral hemispheres consistent with sequelae  of small vessel ischemic disease. There is no evidence of hemorrhage,  mass, acute infarct, or anomaly.     The facial structures appear normal. The arteries at the base of the  brain and the dural venous sinuses appear patent. Paranasal sinus  mucosal thickening. No abnormal enhancement of the brain parenchyma or  meninges.  Inflammatory fluid throughout several left-sided mastoid air cells.  Region of the sella and suprasellar cistern are clear.      Impression    IMPRESSION:    1. Evidence of chronic small vessel change throughout the deep white  matter of both cerebral hemispheres with no acute intracranial  findings.      LAMONTE BRYAN MD       ED MEDICATIONS:   Medications   lidocaine 1 % 0.1-1 mL (has no administration in time range)   lidocaine (LMX4) cream (has no administration in time  range)   sodium chloride (PF) 0.9% PF flush 3 mL (has no administration in time range)   sodium chloride (PF) 0.9% PF flush 3 mL (has no administration in time range)   0.9% sodium chloride BOLUS (has no administration in time range)   gadobutrol (GADAVIST) injection 10 mL (10 mLs Intravenous Given 9/16/20 1845)         Impression:    ICD-10-CM    1. Myelomalacia of cervical cord (H)  G95.89    2. Upper extremity weakness  R29.898        Plan:    Neurosurgery and neurology consulted. Neurosurgery reports patient will likely be managed operatively on a nonemergent basis potentially as an outpatient.  They will continue to follow.   Patient will likely require home health assistance or temporary acute rehab placement.  Given IV fluids with elevated CK.  Asymptomatic COVID swab sent. Neurology will admit patient for ongoing management with neuropsychiatric testing.     MD Severiano Varghese Jill C, MD  09/17/20 0029

## 2020-09-17 NOTE — PROGRESS NOTES
Arrived from:  [facility] ED  Belongings/meds:  Glasses, shoes, clothing, backpack (remain with patient in closet). No meds  2 RN Skin Assessment Completed by:  Gwen GUADARRAMA RN. Yahaira LUX RN  Non-intact findings documented (yes/no/NA): R. Armpit with blanchable erythema. Blanchable erythema to bilateral groin/scrotum. Skin blotchy/red t/o

## 2020-09-17 NOTE — PROGRESS NOTES
09/17/20 1157   General Information   Onset Date 09/16/20   Start of Care Date 09/17/20   Referring Physician Mayra Calvillo MD    Patient Profile Review/OT: Additional Occupational Profile Info See Profile for full history and prior level of function   Patient/Family Goals Statement Pt did not state   Swallowing Evaluation Bedside swallow evaluation   Behaviorial Observations WFL (within functional limits)   Mode of current nutrition Oral diet   Type of oral diet Regular;Thin liquid   Respiratory Status Room air   Comments Pt is a 69 year old male h/o RAKEL on CPAP, HTN, hypokalemia who presents with bilateral UE weakness.    Clinical Swallow Evaluation   Oral Musculature generally intact   Dentition present and adequate   Mucosal Quality good   Mandibular Strength and Mobility intact   Oral Labial Strength and Mobility WFL   Lingual Strength and Mobility WFL   Buccal Strength and Mobility intact   Laryngeal Function Cough;Throat clear;Swallow;Voicing initiated   Additional Documentation Yes   Swallow Eval   Feeding Assistance dependent   Clinical Swallow Eval: Thin Liquid Texture Trial   Mode of Presentation, Thin Liquids cup;straw;fed by clinician   Volume of Liquid or Food Presented 4 oz   Oral Phase of Swallow WFL   Pharyngeal Phase of Swallow intact   Diagnostic Statement No overt s/sx of aspiration, oral phase WFL   Clinical Swallow Eval: Semisolid Texture Trial   Mode of Presentation, Semisolid fed by clinician   Volume of Semisolid Food Presented 5 bites   Oral Phase, Semisolid WFL   Pharyngeal Phase, Semisolid intact   Diagnostic Statement No overt s/sx of aspiration, oral phase WFL   Clinical Swallow Eval: Solid Food Texture Trial   Mode of Presentation, Solid fed by clinician   Volume of Solid Food Presented 1 cracker   Oral Phase, Solid WFL  (Prolonged mastication WFL)   Diagnostic Statement No overt s/sx of aspiration, oral phase WFL   Swallow Compensations   Swallow Compensations No  compensations were used   Esophageal Phase of Swallow   Patient reports or presents with symptoms of esophageal dysphagia No   General Therapy Interventions   Planned Therapy Interventions Dysphagia Treatment   Dysphagia treatment Instruction of safe swallow strategies   Swallow Eval: Clinical Impressions   Skilled Criteria for Therapy Intervention Skilled criteria met.  Treatment indicated.   Functional Assessment Scale (FAS) 7   Treatment Diagnosis Functional oropharyngeal swallowing mechanism   Diet texture recommendations Regular diet;Thin liquids   Recommended Feeding/Eating Techniques alternate between small bites and sips of food/liquid;small sips/bites;maintain upright posture during/after eating for 30 mins   Demonstrates Need for Referral to Another Service occupational therapy;physical therapy   Therapy Frequency Other (see comments)  (x1 following bedside swallow)   Predicted Duration of Therapy Intervention (days/wks) 1 session   Anticipated Discharge Disposition extended care facility   Risks and Benefits of Treatment have been explained. Yes   Patient, family and/or staff in agreement with Plan of Care Yes   Clinical Impression Comments Clinical swallow evaluation completed per MD order.  Pt presents with a functional oropharyngeal swallowing mechanism in the setting of weakness.  Recommend continuaiton of regular diet, thin liquids, with supervision and feeding assist.  Ensure pt is fully alert and upright for all PO, given small bites/sips, alternating bites/sips.  Pt awake and alert, requiring feeding assist per weakness.  Oral mech unremarkable.  No overt s/sx of aspiration with thin via cup/straw, semisolid, solid texture.  Mildly prolonged mastication with solid texture but WFL.  Pt following safe swallow precautions with min cues.  SLP to complete order.   Total Evaluation Time   Total Evaluation Time (Minutes) 11

## 2020-09-17 NOTE — ED NOTES
[facility], [place]   ED Nurse to Floor Handoff     John C Dunphy is a 69 year old male who speaks English and lives alone,  in a home  They arrived in the ED by ambulance from emergency room    ED Chief Complaint: Musculoskeletal Problem (unable to move BUEs progressing over past 2 years; R weaker than L, edema noted BUE. Hand  weak, unable to flex elbows. Pt says he was eval'd for this once and told it is arthritis.)    ED Dx;   Final diagnoses:   Myelomalacia of cervical cord (H)   Upper extremity weakness         Needed?: No    Allergies: No Known Allergies.  Past Medical Hx:   Past Medical History:   Diagnosis Date     Hypertension       Baseline Mental status: WDL  Current Mental Status changes: at basesline    Infection present or suspected this encounter: no  Sepsis suspected: No  Isolation type: No active isolations     Activity level - Baseline/Home:  Stand with Assist  Activity Level - Current:   Stand with Assist    Bariatric equipment needed?: No    In the ED these meds were given:   Medications   lidocaine 1 % 0.1-1 mL (has no administration in time range)   lidocaine (LMX4) cream (has no administration in time range)   sodium chloride (PF) 0.9% PF flush 3 mL (has no administration in time range)   sodium chloride (PF) 0.9% PF flush 3 mL (has no administration in time range)   0.9% sodium chloride BOLUS (1,000 mLs Intravenous New Bag 9/17/20 0242)   gadobutrol (GADAVIST) injection 10 mL (10 mLs Intravenous Given 9/16/20 7235)       Drips running?  No    Home pump  No    Current LDAs  Peripheral IV 09/16/20 Left Upper arm (Active)   Site Assessment [facility] 09/16/20 2604   Number of days: 1       Labs results:   Labs Ordered and Resulted from Time of ED Arrival Up to the Time of Departure from the ED   CBC WITH PLATELETS DIFFERENTIAL - Abnormal; Notable for the following components:       Result Value    Absolute Monocytes 1.4 (*)     All other components within  normal limits   COMPREHENSIVE METABOLIC PANEL - Abnormal; Notable for the following components:    Potassium 3.1 (*)     Glucose 110 (*)     Creatinine 0.64 (*)     All other components within normal limits   MAGNESIUM - Abnormal; Notable for the following components:    Magnesium 2.5 (*)     All other components within normal limits   CK TOTAL - Abnormal; Notable for the following components:    CK Total 878 (*)     All other components within normal limits   ERYTHROCYTE SEDIMENTATION RATE AUTO   CRP INFLAMMATION   INR   PARTIAL THROMBOPLASTIN TIME   PHOSPHORUS   TROPONIN I   NT PROBNP INPATIENT   TSH   T4 FREE   TREPONEMA ABS W REFLEX TO RPR AND TITER   ROUTINE UA WITH MICROSCOPIC   PERIPHERAL IV CATHETER   PULSE OXIMETRY NURSING   CARDIAC CONTINUOUS MONITORING   [facility] CARE       Imaging Studies:   Recent Results (from the past 24 hour(s))   XR Chest Port 1 View    Narrative    CHEST PORTABLE ONE VIEW   9/16/2020 6:11 PM     HISTORY: Short of breath.    COMPARISON: None.      Impression    IMPRESSION: Portable chest. Lungs are clear. Heart is normal in size.  No pneumothorax. No definite pleural effusions.    JUN MONTERO MD   MR Cervical Spine w/o Contrast    Narrative    MRI OF THE CERVICAL SPINE WITHOUT CONTRAST 9/16/2020 7:13 PM    COMPARISON: None    HISTORY: Progressive bilateral upper extremity weakness.    TECHNIQUE: Multiplanar, multisequence MRI images of the cervical spine  were acquired without intravenous contrast.    FINDINGS: There is normal alignment of the cervical vertebrae;  however, there is straightening of normal cervical lordosis. Vertebral  body heights of the cervical spine are normal. Marrow signal  throughout the cervical vertebrae is within normal limits. There is no  evidence for fracture or pathologic bony lesion of the cervical spine.      There is loss of disc height, disc desiccation and posterior disc  bulging to varying degrees at all levels of the cervical spine.      The cervical  spinal cord is mildly deformed by degenerative changes at  the C4-C5 level. The cervical spinal cord is otherwise normal in  contour. There is abnormal T2 signal hyperintensity in the cervical  spinal cord at the C4-C5 level likely representing myelomalacia due to  chronic impingement. There is no other abnormal signal in the cervical  spinal cord.    Level by level:    C2-C3: There is a small posterior central disc herniation  (protrusion). Minimal facet arthropathy bilaterally. The herniated  disc material mildly indents the anterior central aspect of the  cervical spinal cord, but overall spinal canal narrowing is minimal.  There is no foraminal narrowing on either side.    C3-C4: There is facet arthropathy bilaterally, uncinate hypertrophy  bilaterally and a posterior broad-based disc-osteophyte complex with a  superimposed moderate-sized posterior central disc herniation  (protrusion). The herniated disc material mildly indents the anterior  central aspect of the cervical spinal cord. Overall spinal canal  narrowing is mild. Moderate left foraminal stenosis. Mild right  foraminal narrowing.    C4-C5: There is facet arthropathy bilaterally, uncinate hypertrophy  bilaterally and a posterior broad-based disc-osteophyte complex with a  superimposed moderate-sized posterior central disc herniation  (protrusion). The herniated disc material moderately indents the  anterior central aspect of the cervical spinal cord. Abnormal T2  signal hyperintensity consistent with myelomalacia is noted in the  right anterolateral aspect of the cervical spinal cord. Moderate  spinal canal stenosis. Moderate bilateral neural foraminal stenosis.    C5-C6: There is facet arthropathy bilaterally, uncinate hypertrophy  bilaterally and a posterior broad-based disc-osteophyte complex.  Minimal spinal canal narrowing. Moderate left foraminal stenosis.  Minimal right foraminal narrowing.    C6-C7: There is facet arthropathy bilaterally,  uncinate hypertrophy on  the left and a posterior broad-based disc-osteophyte complex. Minimal  spinal canal narrowing. Severe left foraminal stenosis. No right  foraminal stenosis.    C7-T1: There is facet arthropathy bilaterally, uncinate hypertrophy on  the left and a posterior broad-based disc-osteophyte complex. There is  no spinal canal or neural foraminal stenosis at this level.      Impression    IMPRESSION:  1. Severe degenerative changes of the cervical spine as detailed  above.  2. Deformation of the spinal cord by posterior disc herniations at the  C2-C3, C3-C4 and C4-C5 levels as detailed above. Small focus of  presumed myelomalacia in the right anterolateral aspect of the  cervical spinal cord at the C4-C5 level likely due to chronic  impingement.  3. Moderate to severe neural foraminal stenosis on the left at C3-C4,  bilaterally at C4-C5, on the left at C5-C6 and on the left at C6-C7.    NICHOLAS JOHN MD   MR Brain w/o & w Contrast    Narrative    MRI BRAIN WITHOUT and with IV contrast. CONTRAST  9/16/2020 7:35 PM    HISTORY:  Neuro deficit(s), subacute    TECHNIQUE:  Multiplanar, multisequence MRI of the brain with 7.5 cc  gadolinium IV contrast material.    COMPARISON:  None.    FINDINGS:  There is generalized atrophy of the brain. White matter  changes are seen in the cerebral hemispheres consistent with sequelae  of small vessel ischemic disease. There is no evidence of hemorrhage,  mass, acute infarct, or anomaly.     The facial structures appear normal. The arteries at the base of the  brain and the dural venous sinuses appear patent. Paranasal sinus  mucosal thickening. No abnormal enhancement of the brain parenchyma or  meninges.  Inflammatory fluid throughout several left-sided mastoid air cells.  Region of the sella and suprasellar cistern are clear.      Impression    IMPRESSION:    1. Evidence of chronic small vessel change throughout the deep white  matter of both cerebral hemispheres  with no acute intracranial  findings.      LAMONTE BRYAN MD       Recent vital signs:   /79   Pulse 67   Temp 97.7  F (36.5  C) (Oral)   Resp 16   SpO2 92%     Russell Coma Scale Score: 15 (09/16/20 2252)       Cardiac Rhythm: Normal Sinus  Pt needs tele? No  Skin/wound Issues: None    Code Status: Full Code    Pain control: good    Nausea control: good    Abnormal labs/tests/findings requiring intervention: Pt has bilateral upper extremity weakness with no known cause at this time.     Family present during ED course? No   Family Comments/Social Situation comments: N/A    Tasks needing completion: None    Jac Castellon, RN     [phone] [facility] ED

## 2020-09-17 NOTE — PHARMACY-ADMISSION MEDICATION HISTORY
Admission medication history interview status for the 9/16/2020 admission is complete.     See Epic admission navigator for allergy information, pharmacy, prior to admission medications and immunization status.     Additional medication history information: None      Prior to Admission medications    Medication Sig Last Dose   lisinopril-hydrochlorothiazide (ZESTORETIC) 20-12.5 MG tablet Take 1 tablet by mouth 9/16/2020     Medication history completed by: Megha Sandoval, PharmD, BCPS

## 2020-09-17 NOTE — ED NOTES
Bed: ED12  Expected date:   Expected time:   Means of arrival:   Comments:  Handtess Andres J.D. 1950    Cant use bilateral UEs for 2 years, walks, AVSS on RA, A & O x 4, needs neuro work up

## 2020-09-17 NOTE — PLAN OF CARE
Discharge Planner SLP   Patient plan for discharge: Pt did not state  Current status: Clinical swallow evaluation completed per MD order.  Pt presents with a functional oropharyngeal swallowing mechanism in the setting of weakness.  Recommend continuaiton of regular diet, thin liquids, with supervision and feeding assist.  Ensure pt is fully alert and upright for all PO, given small bites/sips, alternating bites/sips.  No overt s/sx of aspiration with thin via cup/straw, semisolid, solid texture.  Mildly prolonged mastication with solid texture but WFL.  Pt following safe swallow precautions with min cues.  SLP to complete order.  Barriers to return to prior living situation: None per SLP perspective  Recommendations for discharge: Defer to PT/OT  Rationale for recommendations: Functional oropharyngeal swallowing mechanism       Entered by: Lindsey Cruz 09/17/2020 12:03 PM

## 2020-09-17 NOTE — PROGRESS NOTES
09/17/20 1500   Quick Adds   Type of Visit Initial Occupational Therapy Evaluation   Living Environment   Lives With alone   Living Arrangements apartment   Home Accessibility stairs to enter home;stairs within home   Number of Stairs, Main Entrance 4   Stair Railings, Main Entrance railings on both sides of stairs   Number of Stairs, Within Home, Primary other (see comments)  (12 full flight to basement)   Stair Railings, Within Home, Primary railings on both sides of stairs   Transportation Anticipated family or friend will provide;other (see comments)  (stated neighbor may be able to assist)   Living Environment Comment Pt lives alone in an apartment w/ steps to enter and inside. Stating that for the last several months has not left his house and has neighbor assist with IADLs. Pt has laundry downstairs, and a tub/shower on the main level with other living necessities.    Self-Care   Usual Activity Tolerance moderate   Current Activity Tolerance fair   Regular Exercise No   Equipment Currently Used at Home none   Activity/Exercise/Self-Care Comment Pt reports being IND at baseline, however states in the last year with progressive weakness not completing or reducing frequency of completing self-care. Notes that he has not bathed in 6 months d/t BUE weakness and that he limits leaving the home because of stairs and fear of falling / not being able to utilize BUE to support self w/ railing.    Functional Level   Ambulation 0-->independent   Transferring 0-->independent   Toileting 0-->independent   Bathing 0-->independent  (reports IND at baseline, however, hasn't bathed in 6 months)   Dressing 0-->independent   Eating   (IND baseline, recently mod-I & only wearing select clothes)   Communication 0-->understands/communicates without difficulty   Cognition 0 - no cognition issues reported   Fall history within last six months no   Which of the above functional risks had a recent onset or change?  "ambulation;transferring;toileting;bathing;dressing;eating   Prior Functional Level Comment Pt reports previous IND with ADLs. However, in the last year pt progressively modifying tasks (transfers, ambulating stairs, dressing), and with some tasks not completing (bathing).    General Information   Onset of Illness/Injury or Date of Surgery - Date 09/16/20   Referring Physician Jesus Sanchez MD    Patient/Family Goals Statement Pt reports wanting to return home and care for self.   Additional Occupational Profile Info/Pertinent History of Current Problem Per chart \"John C Dunphy is a 69 year old male who presents with progressive bilateral upper extremity weakness. patient reports that weakness has become progressively worse. He is now unable to perform ADLs. Patient does not wear clothing at home because it is too difficult to get dressed. He does not bathe because he can't use his arms to wash himself and it is too difficult to get out of the bathtub. He cannot use his hands to eat or prepare food and will eat by bringing his face to the table. He has been quite resourceful in accommodating for the loss of hand/arm use over the past 2 years and presents today because his neighbor was concerned about his ability to continue caring for himself. He generally does not leave his home other than to get the mail, and has not seen a primary care physician since his insurance changed over a year ago\"   Precautions/Limitations fall precautions   Weight-Bearing Status - LUE weight-bearing as tolerated   Weight-Bearing Status - RUE weight-bearing as tolerated   Weight-Bearing Status - LLE full weight-bearing   Weight-Bearing Status - RLE full weight-bearing   General Observations Pt napping at time of arrival wearing CPAP, O2 monitor, and telemonitoring.    General Info Comments Activity: Up ad corrie   Cognitive Status Examination   Orientation orientation to person, place and time   Level of Consciousness alert "   Follows Commands (Cognition) WFL   Memory intact   Attention   (Tangential in conversation. Will continue to monitor.)   Executive Function Impulsive;Self awareness/monitoring impaired   Cognitive Comment Pt appeared impulsive with tasks. Question insight given lack of pursuing medical follow-up for severe UE weakness over past 2 years. May benefit from formal cog screen.    Visual Perception   Visual Perception Wears glasses   Visual Acuity Poor far sighted acuity.    Visual Field Impaired in periphery, pt stating d/t glasses field of view.   Visual Attention Slightly impaired   Visual Perception Comments Pt wears glasses, however, states that prescription should be reevaluated as his vision is blurry for reading items across room.    Sensory Examination   Sensory Comments Assessed sharp/dull and light touch sensation. Pt inconsistent with responses for sharp/dull on RUE gross. LUE less impaired w/ sharp/dull. Able to discriminate light touch.    Pain Assessment   Patient Currently in Pain No   Integumentary/Edema   Integumentary/Edema Comments Skin on R hand and back was flaky, pt stating d/t not showering in months.    Posture   Posture forward head position   Range of Motion (ROM)   ROM Comment Pt AROM severely limited (RUE worse than LUE). Pt able to elevate and retract shoulders. Unable to actively flex or abduct BUE shoulders or flex BUE elbow. Minimal RUE wrist flex/extension, RUE finger extension. LUE limited wrist ex/flexion in antigravity plane, and finger flexion/extension. PROM WFL for BUE.    Strength   Strength Comments RUE grossly 1-2/5, with shoulder flex/abduction, rotation, and elbow movements weakest. Unable to lift shoulders against gravity to flex/abduct without significant trunk compensation. LUE 1-2/5 at shoulder and elbow. 2-2+/5 in wrist flexion.    Hand Strength   Hand Strength Comments L  is stronger than R, however both are very weak.   Coordination   Upper Extremity Coordination  Left UE impaired;Right UE impaired   Coordination Comments Coordination impairments present in BUE   Mobility   Bed Mobility Comments Pt completed bed mobility min-modA   Transfer Skill: Sit to Stand   Level of [place]: Sit/Stand minimum assist (75% patients effort)   Physical Assist/Nonphysical Assist: Sit/Stand 1 person assist   Transfer Skill: Sit to Stand full weight-bearing   Transfer Skill: Toilet Transfer   Level of [place]: Toilet other (see comments)  (Min Ax2)   Physical Assist/Nonphysical Assist: Toilet verbal cues;2 persons   Weight-Bearing Restrictions: Toilet full weight-bearing   Toilet Transfer Skill Comments   (min A x2 w/ VC)   Balance   Balance Comments Unsteady gait. Initially demonstrates short shuffled steps, but then proceeds to larger lunging when asking if he should take bigger steps.    Lower Body Dressing   Level of [place]: Dress Lower Body maximum assist (25% patients effort)   Physical Assist/Nonphysical Assist: Dress Lower Body 2 person assist  (CGA of second person for standing balance. )   Toileting   Level of [place]: Toilet maximum assist (25% patients effort)   Physical Assist/Nonphysical Assist: Toilet 1 person assist   Grooming   Level of [place]: Grooming maximum assist (25% patients effort)   Physical Assist/Nonphysical Assist: Grooming verbal cues  (2nd assist if standing)   Instrumental Activities of Daily Living (IADL)   Previous Responsibilities finances;medication management;shopping;laundry;housekeeping;meal prep   IADL Comments Pt reporting previously IND at baseline prior to onset of weakness, however, recently unable to complete IADLs and having neighbor assist as able.    Activities of Daily Living Analysis   Impairments Contributing to Impaired Activities of Daily Living balance impaired;coordination impaired;fear and anxiety;motor control impaired;muscle tone abnormal;ROM decreased;sensation decreased;strength decreased;cognition impaired  "  ADL Comments Pt has significantly impacted ADLs given current level of weakness in BUE.   General Therapy Interventions   Planned Therapy Interventions ADL retraining;strengthening;ROM;progressive activity/exercise;home program guidelines;stretching;neuromuscular re-education;fine motor coordination training;bed mobility training;balance training;IADL retraining;cognition;transfer training   Clinical Impression   Criteria for Skilled Therapeutic Interventions Met yes, treatment indicated   OT Diagnosis Impaired ability to safety complete ADLs   Influenced by the following impairments BUE weakness, impulsivity, balance, decreased sensation, level of assistance   Assessment of Occupational Performance 5 or more Performance Deficits   Identified Performance Deficits dressing, bathing, g/h, functional transfers, home management, cooking, medication management   Clinical Decision Making (Complexity) Low complexity   Therapy Frequency 6x/week   Predicted Duration of Therapy Intervention (days/wks) 3 weeks   Anticipated Equipment Needs at Discharge   (TBD)   Anticipated Discharge Disposition Transitional Care Facility   Risks and Benefits of Treatment have been explained. Yes   Patient, Family & other staff in agreement with plan of care Yes   Clinical Impression Comments Pt is functioning significantly below baseline and would benefit from continued skilled occupational therapy to progress towards PLOF.    [facility] AM-PAC  \"6 Clicks\" Daily Activity Inpatient Short Form   1. Putting on and taking off regular lower body clothing? 2 - A Lot   2. Bathing (including washing, rinsing, drying)? 2 - A Lot   3. Toileting, which includes using toilet, bedpan or urinal? 2 - A Lot   4. Putting on and taking off regular upper body clothing? 2 - A Lot   5. Taking care of personal grooming such as brushing teeth? 2 - A Lot   6. Eating meals? 2 - A Lot   Daily Activity Raw Score (Score out of 24.Lower scores equate to lower " levels of function) 12   Total Evaluation Time   Total Evaluation Time (Minutes) 20

## 2020-09-17 NOTE — PLAN OF CARE
OT order received. Patient awaiting neurosurgery consult, will hold OT evaluation until consult completed and plan of care established.

## 2020-09-17 NOTE — PLAN OF CARE
Discharge Planner OT   Patient plan for discharge: rehab  Current status: OT orders received and eval completed. Pt demonstrating significant weakness in BUE, RUE worse than LUE. RUE has noted sensory impairments to sharp/dull sensation. Pt impulsive throughout session with tasks. Pt required min-modAx1-2 for bed mobility, STS, and in-room mobility. MaxA for LB clothing management, hand hygiene. Pt demonstrates some insight to condition wondering how he will be able to return home.   Barriers to return to prior living situation: medical status, BUE weakness, RUE sensory impairments, lives alone, stairs, level of assist  Recommendations for discharge: TCU with potential for LTC.   Rationale for recommendations: Pt is functioning below baseline and would benefit from continued skilled occupational therapy to modify ADL participation/promote maximized independence within severe UE weakness deficits. Per chart, having significantly difficulty caring for self at home, lives alone. May need increased assist long term.        Entered by: Zoya Cabrera 09/17/2020 4:50 PM

## 2020-09-17 NOTE — PROGRESS NOTES
Status: Pt. With h/o RAKEL on CPAP, HTN, hypokalemia who presents with bilateral UE weakness since 2018.   Vitals: VSS on RA, HTN within parameters and intermittently amparo while resting (55-60bpm). Attempted to put on CPAP but with continuous beeping/leaking d/t facial hair,switched to 2L oxymask. On CCM.   Neuros: A&Ox4. LUE 3/5, RUE 2/5, BLE 5/5, decreased sensation to bottoms of feet of BLE   IV: L. PIVx2 with on SL and other infusing rest of 1,000mL NS bolus   Resp/trach: Wears CPAP overnight at home, does not have home equipment at this time. Switched to 2L oxymask  Diet: Regular   Bowel status: No BM this shift, LBM 9/16  : Voids spontaneously via urinal/BR, has not voided since being on 6A but went in ED with elevated PVR's (200's)  Skin: RUE armpit with blanchable redness. Bilateral groin and scrotum with blanchable erythema, barrier cream applied. Skin is red/blotchy t/o.   Pain: No c/o pain this shift   Activity: Up A1/GB, unsteady gait   Social: Would like sister and brother to be updated later this morning, AM nurse notified and #'s on board in pt. Room   Plan: Plan for neurosurgery consult, neuropsych eval, and consider EMG. Continue to monitor and follow POC.

## 2020-09-17 NOTE — ED NOTES
Pt presents reporting increasing weakness to his arms for the past 2 years. Pt states he is still able to move his left arm some and use his fingers. Pt then states he cannot move his right arm but can somewhat move his right fingers. Pt states he is no longer able to properly care for himself at this time. Pt is unable to move his right arm against gravity in the room.

## 2020-09-17 NOTE — CONSULTS
[facility]       NEUROSURGERY CONSULTATION NOTE    This consultation was requested by Dr. العلي from the ED service.    Reason for Consultation: Cervical Canal Stenosis    HPI: John C Dunphy is a 69 year old male with a history or h/o RAKEL on CPAP, HTN, hypokalemia who presents with bilateral UE weakness.   The weakness started in the right UE in the summer of 2018. He noticed that he can't reach the top of his head. This weakness progressively got worse to the point where he was no longer able to lift his arm at all. He progressed to the point where he was not able to write or feed himself. He started noticing symptoms in his LUE a year ago and this also progressively got weaker. He is not able to coomb his hair or brush his teeth or feed himself with either extremity. He eats by his mouth from the plate. He denies weakness in his legs but he is very cautious walking or walking up or down stairs due to his fear of being unable to catch himself with how weak his arms are. He has not gone outside much at all this past year. He noticed that his walking is very slow and steps are short due to his caution. He denies any sensory changes to the arms but states he has numbness in the bottom of both feet. He denies any issues with bowel or bladder. He denies any swallowing difficulty, blurry vision, double vision, change in facial sensation, facial droop, balance issues, headache, or memory issues.     PAST MEDICAL HISTORY:   Past Medical History:   Diagnosis Date     Hypertension        PAST SURGICAL HISTORY: History reviewed. No pertinent surgical history.    FAMILY HISTORY: History reviewed. No pertinent family history.    SOCIAL HISTORY:   Social History     Tobacco Use     Smoking status: Not on file   Substance Use Topics     Alcohol use: Not on file       MEDICATIONS:  (Not in a hospital admission)      Allergies:  No Known Allergies    ROS: 10 point ROS were all  negative except for pertinent positives noted in my HPI.    Physical exam:   Blood pressure (!) 154/98, pulse 89, temperature 97.7  F (36.5  C), temperature source Oral, resp. rate 16, SpO2 94 %.  CV: HR and BP as noted above  PULM: breathing comfortably  ABD: soft, non-distended, obese  NEUROLOGIC:  -- Awake; Alert; oriented x 3  -- Follows commands briskly  -- Speech fluent, spontaneous. No aphasia or dysarthria.  -- no gaze preference. No apparent hemineglect.  Cranial Nerves:  -- PERRL 3-2mm bilat and brisk, extraocular movements intact  -- face symmetrical, tongue midline  -- sensory V1-V3 intact bilaterally  -- palate elevates symmetrically, uvula midline  -- hearing grossly intact bilat  -- Trapezii 5/5 strength bilat symmetric    Motor:  Normal bulk / tone; no tremor, rigidity, or bradykinesia.  No muscle wasting or fasciculations       Delt Bi Tri Hand Flexion/  Extension Iliopsoas Quadriceps Hamstrings Tibialis Anterior Gastroc    C5 C6 C7 C8/T1 L2 L3 L4-S1 L4 S1   R 0 0 2 3/0 5 5 5 5 5   L 3 3 3 4/3 5 5 5 5 5   Sensory:  intact to LT x 4 extremities     Reflexes:  Loss of reflexes in the upper extremities, negative guido's, lower extremities 2+ throughout. No clonus.    Gait: Deferred      LABS:  Recent Labs   Lab 09/16/20  1745      POTASSIUM 3.1*   CHLORIDE 106   CO2 28   ANIONGAP 7   *   BUN 9   CR 0.64*   MELLY 8.8       Recent Labs   Lab 09/16/20  1745   WBC 10.4   RBC 5.38   HGB 17.1   HCT 46.9   MCV 87   MCH 31.8   MCHC 36.5   RDW 14.2        IMAGING:  Recent Results (from the past 24 hour(s))   XR Chest Port 1 View    Narrative    CHEST PORTABLE ONE VIEW   9/16/2020 6:11 PM     HISTORY: Short of breath.    COMPARISON: None.      Impression    IMPRESSION: Portable chest. Lungs are clear. Heart is normal in size.  No pneumothorax. No definite pleural effusions.    JUN MONTERO MD   MR Cervical Spine w/o Contrast    Narrative    MRI OF THE CERVICAL SPINE WITHOUT CONTRAST 9/16/2020  7:13 PM    COMPARISON: None    HISTORY: Progressive bilateral upper extremity weakness.    TECHNIQUE: Multiplanar, multisequence MRI images of the cervical spine  were acquired without intravenous contrast.    FINDINGS: There is normal alignment of the cervical vertebrae;  however, there is straightening of normal cervical lordosis. Vertebral  body heights of the cervical spine are normal. Marrow signal  throughout the cervical vertebrae is within normal limits. There is no  evidence for fracture or pathologic bony lesion of the cervical spine.      There is loss of disc height, disc desiccation and posterior disc  bulging to varying degrees at all levels of the cervical spine.      The cervical spinal cord is mildly deformed by degenerative changes at  the C4-C5 level. The cervical spinal cord is otherwise normal in  contour. There is abnormal T2 signal hyperintensity in the cervical  spinal cord at the C4-C5 level likely representing myelomalacia due to  chronic impingement. There is no other abnormal signal in the cervical  spinal cord.    Level by level:    C2-C3: There is a small posterior central disc herniation  (protrusion). Minimal facet arthropathy bilaterally. The herniated  disc material mildly indents the anterior central aspect of the  cervical spinal cord, but overall spinal canal narrowing is minimal.  There is no foraminal narrowing on either side.    C3-C4: There is facet arthropathy bilaterally, uncinate hypertrophy  bilaterally and a posterior broad-based disc-osteophyte complex with a  superimposed moderate-sized posterior central disc herniation  (protrusion). The herniated disc material mildly indents the anterior  central aspect of the cervical spinal cord. Overall spinal canal  narrowing is mild. Moderate left foraminal stenosis. Mild right  foraminal narrowing.    C4-C5: There is facet arthropathy bilaterally, uncinate hypertrophy  bilaterally and a posterior broad-based disc-osteophyte  complex with a  superimposed moderate-sized posterior central disc herniation  (protrusion). The herniated disc material moderately indents the  anterior central aspect of the cervical spinal cord. Abnormal T2  signal hyperintensity consistent with myelomalacia is noted in the  right anterolateral aspect of the cervical spinal cord. Moderate  spinal canal stenosis. Moderate bilateral neural foraminal stenosis.    C5-C6: There is facet arthropathy bilaterally, uncinate hypertrophy  bilaterally and a posterior broad-based disc-osteophyte complex.  Minimal spinal canal narrowing. Moderate left foraminal stenosis.  Minimal right foraminal narrowing.    C6-C7: There is facet arthropathy bilaterally, uncinate hypertrophy on  the left and a posterior broad-based disc-osteophyte complex. Minimal  spinal canal narrowing. Severe left foraminal stenosis. No right  foraminal stenosis.    C7-T1: There is facet arthropathy bilaterally, uncinate hypertrophy on  the left and a posterior broad-based disc-osteophyte complex. There is  no spinal canal or neural foraminal stenosis at this level.      Impression    IMPRESSION:  1. Severe degenerative changes of the cervical spine as detailed  above.  2. Deformation of the spinal cord by posterior disc herniations at the  C2-C3, C3-C4 and C4-C5 levels as detailed above. Small focus of  presumed myelomalacia in the right anterolateral aspect of the  cervical spinal cord at the C4-C5 level likely due to chronic  impingement.  3. Moderate to severe neural foraminal stenosis on the left at C3-C4,  bilaterally at C4-C5, on the left at C5-C6 and on the left at C6-C7.    NICHOLAS JOHN MD   MR Brain w/o & w Contrast    Narrative    MRI BRAIN WITHOUT and with IV contrast. CONTRAST  9/16/2020 7:35 PM    HISTORY:  Neuro deficit(s), subacute    TECHNIQUE:  Multiplanar, multisequence MRI of the brain with 7.5 cc  gadolinium IV contrast material.    COMPARISON:  None.    FINDINGS:  There is  generalized atrophy of the brain. White matter  changes are seen in the cerebral hemispheres consistent with sequelae  of small vessel ischemic disease. There is no evidence of hemorrhage,  mass, acute infarct, or anomaly.     The facial structures appear normal. The arteries at the base of the  brain and the dural venous sinuses appear patent. Paranasal sinus  mucosal thickening. No abnormal enhancement of the brain parenchyma or  meninges.  Inflammatory fluid throughout several left-sided mastoid air cells.  Region of the sella and suprasellar cistern are clear.      Impression    IMPRESSION:    1. Evidence of chronic small vessel change throughout the deep white  matter of both cerebral hemispheres with no acute intracranial  findings.      LAMONTE BRYAN MD       ASSESSMENT:  69 year old male with 2 year history of progressive upper extremity weakness. Found to have cervical canal stenosis with myelomalacia. Being admitted to neurology for neurological workup, as well as placement due to him not being able to care for himself anymore.    RECOMMENDATIONS:  - No acute neurosurgical intervention indicated at this time   - Agree with neurology workup  - Recommend ESR  - Will continue to follow    The patient was discussed with Dr. Marie, neurosurgery chief resident, and they agree with the above.    Susi Kinney MD  Neurosurgery Resident, PGY-2      I have reviewed the history above and agree with the resident's assessment and plan.  NUBIA Laboy MD

## 2020-09-18 ENCOUNTER — APPOINTMENT (OUTPATIENT)
Dept: PHYSICAL THERAPY | Facility: CLINIC | Age: 70
DRG: 057 | End: 2020-09-18
Attending: STUDENT IN AN ORGANIZED HEALTH CARE EDUCATION/TRAINING PROGRAM
Payer: COMMERCIAL

## 2020-09-18 ENCOUNTER — APPOINTMENT (OUTPATIENT)
Dept: OCCUPATIONAL THERAPY | Facility: CLINIC | Age: 70
DRG: 057 | End: 2020-09-18
Payer: COMMERCIAL

## 2020-09-18 LAB
ALBUMIN SERPL-MCNC: 2.7 G/DL (ref 3.4–5)
ALP SERPL-CCNC: 78 U/L (ref 40–150)
ALT SERPL W P-5'-P-CCNC: 26 U/L (ref 0–70)
ANION GAP SERPL CALCULATED.3IONS-SCNC: 6 MMOL/L (ref 3–14)
AST SERPL W P-5'-P-CCNC: 21 U/L (ref 0–45)
BILIRUB SERPL-MCNC: 0.8 MG/DL (ref 0.2–1.3)
BUN SERPL-MCNC: 10 MG/DL (ref 7–30)
CALCIUM SERPL-MCNC: 8.1 MG/DL (ref 8.5–10.1)
CHLORIDE SERPL-SCNC: 107 MMOL/L (ref 94–109)
CK SERPL-CCNC: 448 U/L (ref 30–300)
CO2 SERPL-SCNC: 26 MMOL/L (ref 20–32)
CREAT SERPL-MCNC: 0.67 MG/DL (ref 0.66–1.25)
ERYTHROCYTE [DISTWIDTH] IN BLOOD BY AUTOMATED COUNT: 14.2 % (ref 10–15)
GFR SERPL CREATININE-BSD FRML MDRD: >90 ML/MIN/{1.73_M2}
GLUCOSE BLDC GLUCOMTR-MCNC: 125 MG/DL (ref 70–99)
GLUCOSE BLDC GLUCOMTR-MCNC: 149 MG/DL (ref 70–99)
GLUCOSE SERPL-MCNC: 116 MG/DL (ref 70–99)
HBA1C MFR BLD: 6.8 % (ref 0–5.6)
HCT VFR BLD AUTO: 46 % (ref 40–53)
HGB BLD-MCNC: 15.4 G/DL (ref 13.3–17.7)
MCH RBC QN AUTO: 29.7 PG (ref 26.5–33)
MCHC RBC AUTO-ENTMCNC: 33.5 G/DL (ref 31.5–36.5)
MCV RBC AUTO: 89 FL (ref 78–100)
PLATELET # BLD AUTO: 173 10E9/L (ref 150–450)
POTASSIUM SERPL-SCNC: 2.9 MMOL/L (ref 3.4–5.3)
POTASSIUM SERPL-SCNC: 3.5 MMOL/L (ref 3.4–5.3)
PROT SERPL-MCNC: 6.3 G/DL (ref 6.8–8.8)
RBC # BLD AUTO: 5.19 10E12/L (ref 4.4–5.9)
SARS-COV-2 RNA SPEC QL NAA+PROBE: NOT DETECTED
SODIUM SERPL-SCNC: 139 MMOL/L (ref 133–144)
SPECIMEN SOURCE: NORMAL
WBC # BLD AUTO: 7.5 10E9/L (ref 4–11)

## 2020-09-18 PROCEDURE — 80053 COMPREHEN METABOLIC PANEL: CPT | Performed by: PSYCHIATRY & NEUROLOGY

## 2020-09-18 PROCEDURE — 84132 ASSAY OF SERUM POTASSIUM: CPT | Performed by: PSYCHIATRY & NEUROLOGY

## 2020-09-18 PROCEDURE — 82550 ASSAY OF CK (CPK): CPT | Performed by: PSYCHIATRY & NEUROLOGY

## 2020-09-18 PROCEDURE — 97162 PT EVAL MOD COMPLEX 30 MIN: CPT | Mod: GP | Performed by: PHYSICAL THERAPIST

## 2020-09-18 PROCEDURE — 97530 THERAPEUTIC ACTIVITIES: CPT | Mod: GP | Performed by: PHYSICAL THERAPIST

## 2020-09-18 PROCEDURE — 97530 THERAPEUTIC ACTIVITIES: CPT | Mod: GO | Performed by: OCCUPATIONAL THERAPIST

## 2020-09-18 PROCEDURE — 25000132 ZZH RX MED GY IP 250 OP 250 PS 637: Performed by: STUDENT IN AN ORGANIZED HEALTH CARE EDUCATION/TRAINING PROGRAM

## 2020-09-18 PROCEDURE — 12000001 ZZH R&B MED SURG/OB UMMC

## 2020-09-18 PROCEDURE — 97535 SELF CARE MNGMENT TRAINING: CPT | Mod: GO | Performed by: OCCUPATIONAL THERAPIST

## 2020-09-18 PROCEDURE — 25000128 H RX IP 250 OP 636: Performed by: STUDENT IN AN ORGANIZED HEALTH CARE EDUCATION/TRAINING PROGRAM

## 2020-09-18 PROCEDURE — 83036 HEMOGLOBIN GLYCOSYLATED A1C: CPT | Performed by: PSYCHIATRY & NEUROLOGY

## 2020-09-18 PROCEDURE — 36415 COLL VENOUS BLD VENIPUNCTURE: CPT | Performed by: PSYCHIATRY & NEUROLOGY

## 2020-09-18 PROCEDURE — 00000146 ZZHCL STATISTIC GLUCOSE BY METER IP

## 2020-09-18 PROCEDURE — 97116 GAIT TRAINING THERAPY: CPT | Mod: GP | Performed by: PHYSICAL THERAPIST

## 2020-09-18 PROCEDURE — 85027 COMPLETE CBC AUTOMATED: CPT | Performed by: PSYCHIATRY & NEUROLOGY

## 2020-09-18 RX ORDER — POTASSIUM CL/LIDO/0.9 % NACL 10MEQ/0.1L
10 INTRAVENOUS SOLUTION, PIGGYBACK (ML) INTRAVENOUS
Status: DISCONTINUED | OUTPATIENT
Start: 2020-09-18 | End: 2020-09-25 | Stop reason: HOSPADM

## 2020-09-18 RX ORDER — DEXTROSE MONOHYDRATE 25 G/50ML
25-50 INJECTION, SOLUTION INTRAVENOUS
Status: DISCONTINUED | OUTPATIENT
Start: 2020-09-18 | End: 2020-09-25 | Stop reason: HOSPADM

## 2020-09-18 RX ORDER — POTASSIUM CHLORIDE 29.8 MG/ML
20 INJECTION INTRAVENOUS
Status: DISCONTINUED | OUTPATIENT
Start: 2020-09-18 | End: 2020-09-25 | Stop reason: HOSPADM

## 2020-09-18 RX ORDER — MAGNESIUM SULFATE HEPTAHYDRATE 40 MG/ML
4 INJECTION, SOLUTION INTRAVENOUS EVERY 4 HOURS PRN
Status: DISCONTINUED | OUTPATIENT
Start: 2020-09-18 | End: 2020-09-25 | Stop reason: HOSPADM

## 2020-09-18 RX ORDER — POTASSIUM CHLORIDE 750 MG/1
20-40 TABLET, EXTENDED RELEASE ORAL
Status: DISCONTINUED | OUTPATIENT
Start: 2020-09-18 | End: 2020-09-25 | Stop reason: HOSPADM

## 2020-09-18 RX ORDER — POTASSIUM CHLORIDE 7.45 MG/ML
10 INJECTION INTRAVENOUS
Status: DISCONTINUED | OUTPATIENT
Start: 2020-09-18 | End: 2020-09-25 | Stop reason: HOSPADM

## 2020-09-18 RX ORDER — POTASSIUM CHLORIDE 1.5 G/1.58G
20-40 POWDER, FOR SOLUTION ORAL
Status: DISCONTINUED | OUTPATIENT
Start: 2020-09-18 | End: 2020-09-25 | Stop reason: HOSPADM

## 2020-09-18 RX ORDER — NICOTINE POLACRILEX 4 MG
15-30 LOZENGE BUCCAL
Status: DISCONTINUED | OUTPATIENT
Start: 2020-09-18 | End: 2020-09-25 | Stop reason: HOSPADM

## 2020-09-18 RX ADMIN — POTASSIUM CHLORIDE 40 MEQ: 750 TABLET, EXTENDED RELEASE ORAL at 10:34

## 2020-09-18 RX ADMIN — HYDROCHLOROTHIAZIDE: 12.5 CAPSULE ORAL at 08:27

## 2020-09-18 RX ADMIN — ENOXAPARIN SODIUM 40 MG: 40 INJECTION SUBCUTANEOUS at 10:00

## 2020-09-18 RX ADMIN — POTASSIUM CHLORIDE 40 MEQ: 750 TABLET, EXTENDED RELEASE ORAL at 08:27

## 2020-09-18 ASSESSMENT — ACTIVITIES OF DAILY LIVING (ADL)
ADLS_ACUITY_SCORE: 18
ADLS_ACUITY_SCORE: 20
ADLS_ACUITY_SCORE: 18
ADLS_ACUITY_SCORE: 20
ADLS_ACUITY_SCORE: 20
ADLS_ACUITY_SCORE: 18

## 2020-09-18 ASSESSMENT — VISUAL ACUITY
OU: NORMAL ACUITY;GLASSES
OU: NORMAL ACUITY;GLASSES

## 2020-09-18 NOTE — PLAN OF CARE
PT - 6A   Discharge Planner PT   Patient plan for discharge: TCU  Current status: PT evaluation completed. Patient slightly impulsive with mobility, requires cueing to wait for supervision before mobilizing. SBA for bed mobility. CGA for sit<>stand transfers. Ambulated 175ft x2, close CGA and w/c follow. Patient notes feeling dizzy and unsteady with ambulation, O2sat stable at 93%+ throughout activity. CGA for toileting, assist for cleanup.   Barriers to return to prior living situation: Current medical status, general deconditioning, BUE weakness, current level of assist required, lives alone  Recommendations for discharge: TCU; potential for LTC  Rationale for recommendations: Patient is functioning below previous baseline and would benefit from continued therapy services. BUE weakness is significantly impacting mobility and self-care within the patients home, patient lives alone and does not have 24hr assist available.  Entered by: Joey Avila 09/18/2020 10:28 AM

## 2020-09-18 NOTE — PLAN OF CARE
Status: Pt admitted for evaluation of bilateral UE weakness since 2018. Pt witht h/o RAKEL on CPAP, HTN, hypokalemia. K+ replacement completed per PRN protocol. Re-draw @1500 and 9/19 AM.   Vitals: VSS ex HTN within parameters. 2L oxymask when napping/sleeping. On CCM.   Neuros: AxOx4. RUE 2/5, LUE 3/5, BLE 5/5. Decreased sensation in BLE at baseline.   IV: PIV SL.  Resp/trach: LS clear. Uses CPAP at home.   Diet: Regular diet. Needs assistance w/ ordering and feeding.   Bowel status: BS+. No BM this shift.   : Voiding w/ some post void rentention. Encourage bedside urinal use.   Skin: RUE armpit with blanchable redness. Bilateral groin and scrotum with blanchable erythema.  Pain: denied pain.  Activity: up A2 and GB. Pt can be unsteady at times. Ambulated In halls w/ therapy and up chairx1.    Social: pt spoke to sister and neighbor on the phone this shift.   Plan: Therapy recommending TCU vs LTC when medically ready, continue to monitor and follow POC.

## 2020-09-18 NOTE — PROGRESS NOTES
[facility], [place]   09/18/2020  Neurosurgery Progress Note:    Assessment:  John C Dunphy is a 69 year old man with a 2 year history of progressive upper extremity weakness. Found to have cervical canal stenosis with myelomalacia. Admitted to neurology for neurological workup, as well as placement due to him not being able to care for himself anymore. His neurological exam is not completely convincing to be related to his cervical stenosis, as his legs and sensation are spared.    Plan:  - Agree with complete neurologic workup for his presentation  - There is no neurosurgical intervention for now  - Neurosurgery to sign off  - If entire neurologic workup does not provide a diagnosis, and symptoms continue to worsen, he may follow up in clinic for further evaluation    -----------------------------------  Susi Kinney MD  Neurosurgery Resident, PGY-2    Please contact neurosurgery resident on call with questions.    Dial * * [phone], enter [phone] when prompted.     Interval History: ARINA    Objective:   Temp:  [97.8  F (36.6  C)-98.4  F (36.9  C)] 97.8  F (36.6  C)  Pulse:  [59-65] 65  Resp:  [16-18] 16  BP: (140-186)/(85-97) 165/89  SpO2:  [96 %-99 %] 97 %  I/O last 3 completed shifts:  In: -   Out: 560 [Urine:560]    Gen: Appears comfortable, NAD  Neurologic:  -- Awake; Alert; oriented x 3  -- Follows commands briskly  -- Speech fluent, spontaneous. No aphasia or dysarthria.  -- no gaze preference. No apparent hemineglect.  Cranial Nerves:  -- PERRL 3-2mm bilat and brisk, extraocular movements intact  -- face symmetrical, tongue midline  -- sensory V1-V3 intact bilaterally  -- palate elevates symmetrically, uvula midline  -- hearing grossly intact bilat  -- Trapezii 5/5 strength bilat symmetric     Motor:  Normal bulk / tone; no tremor, rigidity, or bradykinesia.  No muscle wasting or fasciculations          Delt Bi Tri Hand Flexion/  Extension Iliopsoas Quadriceps Hamstrings  Tibialis Anterior Gastroc     C5 C6 C7 C8/T1 L2 L3 L4-S1 L4 S1   R 0 0 2 3/0 5 5 5 5 5   L 3 3 3 4/3 5 5 5 5 5   Sensory:  intact to LT x 4 extremities      Reflexes:  Loss of reflexes in the upper extremities, negative guido's, lower extremities 2+ throughout. No clonus.    LABS:  Recent Labs   Lab 09/16/20  1745      POTASSIUM 3.1*   CHLORIDE 106   CO2 28   ANIONGAP 7   *   BUN 9   CR 0.64*   MELLY 8.8       Recent Labs   Lab 09/16/20  1745   WBC 10.4   RBC 5.38   HGB 17.1   HCT 46.9   MCV 87   MCH 31.8   MCHC 36.5   RDW 14.2          IMAGING:  No results found for this or any previous visit (from the past 24 hour(s)).     I have reviewed the history above and agree with the resident's assessment and plan.  NUBIA Laboy MD

## 2020-09-18 NOTE — PROGRESS NOTES
"[facility]  Neurology Progress Note      John C Dunphy  [id number]  09/18/2020    Interval events and subjective:  No events overnight. Reports not being able to sleep overnight.    Objective:  Physical Examination   Vitals: BP (!) 165/89 (BP Location: Right arm)   Pulse 65   Temp 97.8  F (36.6  C) (Oral)   Resp 16   Ht 1.676 m (5' 6\")   Wt 98.4 kg (216 lb 14.4 oz)   SpO2 97%   BMI 35.01 kg/m    Mental status: Awake, alert, attentive, oriented to self, time, place, and circumstance. Language is fluent and coherent with intact comprehension of complex commands, naming and repetition.   Cranial nerves: VFF, PERRL, conjugate gaze, EOMI, facial sensation intact, face symmetric, shoulder shrug strong, tongue/uvula midline, no dysarthria.   Motor: hypotonia of both UEs, RT>LT. No abnormal movements.     Right Left   Neck flexion 5 5   Neck extension: 5 5   Shoulder abduction:           1 3-   Elbow extension: 2 3   Elbow flexion:            0 2+   Wrist flexion:            3 4   Wrist extension:            2 3   Finger flexion 3- 4   FDI 1 2   Hip flexion 5 5   Hip extension 5 5   Knee flexion 5 5   Knee extension 5 5   Dorsiflexion 5 5   Planter flexion 5 5      Reflexes: areflexic and symmetric biceps, brachioradialis, triceps. 2+ reflexes in patellae, and achilles bilaterally. Toes down going b/l.  Sensory: intact to light touch in all 4 extremities. Intact pinprick in all ext. Intact sense of position and Vibration.  Coordination: FNF can't be done because of the UEs weakness.   Gait: deferred     Pertinent Studies    MRI brain without contrast:   IMPRESSION:    1. Evidence of chronic small vessel change throughout the deep white  matter of both cerebral hemispheres with no acute intracranial  Findings.     MRI cervical spines:  IMPRESSION:  1. Severe degenerative changes of the cervical spine as detailed  above.  2. Deformation of the spinal cord by posterior disc herniations at " the  C2-C3, C3-C4 and C4-C5 levels as detailed above. Small focus of  presumed myelomalacia in the right anterolateral aspect of the  cervical spinal cord at the C4-C5 level likely due to chronic  impingement.  3. Moderate to severe neural foraminal stenosis on the left at C3-C4,  bilaterally at C4-C5, on the left at C5-C6 and on the left at C6-C7.     Assessment and Plan:  69 year old male h/o RAKEL on CPAP, HTN, hypokalemia who presents with bilateral UE weakness since 2018. The right is weaker than the left. No LE weakness. Has reduced sensation in both UEs and LEs. Areflexia in both UEs and preserved reflexes in both LEs. MRI brain with diffuse brain atrophy. MRI cervical spines with severe degenerative changes, foraminal narrowing at multiple levels and cord compression at C4-5 level with small encephalomalacia at that level.   The C4-5 cervical disc and compression of multiple roots can explain the UE weakness but the absence of LE weakness and urinary incontinence is not typical. The weakness is severe with fasciculation and the sensory changes are not clear, this raises the possibility of motor neuron disease. The fact that he did not seek help for two years and got along with his illness in the setting of inability to eat or take care of himself suggesting cognitive dysfunction and poor judgment.      #Bilateral UE weakness:  - EMG today  - PT and OT evaluation  - Neurosurgery signed off, no intervention indicated  - Neuropsychology service recommended outpatient follow up for a neuropsychological evaluation     # Hypertension  Pt had hx of hypokalemia, will switch Lisinopril-hydrochlorothiazide to Lisinopril  - Discontinue PTA Lisinopril-hydrochlorothiazide 20-12.5 mg daily  - Start Lisinopril 30 mg daily       #DM type 2  Hgb A1c 6.8, not on medication at home  - Medium intensity sliding scale      # Chronic hypokalemia   - Electrolyte replacement protocol  - Discontinue Hydrochlorothiazide     # RAKEL   -  CPAP      Diet: Regular diet  DVT ppx: Lovenox  CODE STATUS: Full  Disposition: Per OT/PT, TCU with potential for LTC.     Patient was seen and discussed with attending doctor, Dr. Kelsey.    Nicho Espinal MD  Neurology Resident PGY2  Pager [phone]

## 2020-09-18 NOTE — CONSULTS
The neuropsychology division reviewed the consult and we agree that this patient would best be served on an outpatient basis for a neuropsychological evaluation. The referring physicians were contacted and requested to change the consult to an outpatient evaluation. Thank you for this referral.    Dane Terrell, PhD, ABPP

## 2020-09-18 NOTE — PLAN OF CARE
Status: Pt. With h/o RAKEL on CPAP, HTN, hypokalemia who presents with bilateral UE weakness since 2018.   Vitals: VSS on 2L oxymask overnight, HTN within parameters and intermittently amparo while resting (55-60bpm). CCM.   Neuros: A&Ox4. Diminished hearing, LUE 3/5, RUE 2/5, BLE 5/5, decreased sensation to bottoms of feet of BLE   IV: L. PIVx2,SL   Resp/trach: 2L oxymask  Diet: Regular, assistance with feeding   Bowel status: No BM this shift, LBM 9/16  : Voids spontaneously via urinal with increased PVR's (refer to flowsheets)  Skin: RUE armpit with blanchable redness. Bilateral groin and scrotum with blanchable erythema, barrier cream applied. Skin is red/blotchy t/o.   Pain: No c/o pain this shift   Activity: Up A2/GB, unsteady gait   Plan: Plan for neuropsych eval and EMG today. Continue to monitor and follow POC.

## 2020-09-18 NOTE — PROGRESS NOTES
"Social Work: Assessment with Discharge Plan    Patient Name:  John C Dunphy  :  1950  Age:  69 year old  MRN:  [id number]  Risk/Complexity Score:   2  Completed assessment with:  Patient    Presenting Information   Reason for Referral:  Assessment and Discharge planning  Date of Intake:  2020  Referral Source:  Case Finding  Decision Maker:  Patient  Alternate Decision Maker:  Siblings in absence of a health care directive as per [employer] policy  Health Care Directive:  Provided education  Living Situation:  Prior to hospitalization, pt was living alone in a 1st floor apt ([address], rent is 1/3rd of pt's income).  There are 4 steps to enter the building (with a handrail).  Pt's bathroom has a tub/shower combo.  The building has a washing machine in the basement but no dryer (pt has been hanging his clothing to dry).   Previous Functional Status: Pt states that he was indep with all mobility  (no assistive device, no falls in the past year).  Pt states that he had \"cut back\" on the frequency of his bathing.  Pt states that he completed adl's as best he could as pt states that all tasks were difficult as he has \"almost totally lost use of his right arm and the left seems to be moving in the same direction.\"  Pt states that the doctors are now trying to figure out what is causing this.  Pt states that he couldn't wipe after toileting so was putting newspapers and or napkins on chairs, getting them to stick to him so that he could wipe off/dry off as best as possible.  Pt states that he was not taking any medications (no prescriptions, does not have a PCP).  Pt states that he was indep with financial mgnt.  Pt states that he only minimally leaves his home.  Pt states that he used the bus or walked for transportation but again, pt states that since covid, he rarely has left his home.  Pt states that he was indep completing his homemaking tasks.  Pt states that he ordered food delivered.  Pt states " "that he has a reacher and a clamp on grab bar (pt states that the grab bar is not stable).  Pt was not utilizing any community resources.    Patient and family understanding of hospitalization:  Pt states he has \"almost totally lost use of his right arm and the left seems to be moving in the same direction.\"  Pt states that the doctors are now trying to figure out what is causing this  Cultural/Language/Spiritual Considerations:  None per pt  Adjustment to Illness:  Pt is worried that he will have a condition that is not reversable    Physical Health  Reason for Admission:    1. Myelomalacia of cervical cord (H)    2. Upper extremity weakness      Services Needed/Recommended:  TCU    Mental Health/Chemical Dependency  Diagnosis:  Not applicable  Support/Services in Place:  Not applicable  Services Needed/Recommended:  Not applicable    Support System  Significant relationship at present time:  Pt has never  and has no children.  Pt's parents are .  Pt has 3 siblings:  1.  Marleny Coburn ([place]) [phone]  2.  Dane ( [facility])  [phone])  3.  Murray ([place]?).  Pt states that he maintains contact with Marleny and Dane.  Pt states that he does not maintain contact with Mruray as Murray absconded with his inheritance from their parents.   Pt has a neighbor (Megha Parker) whom pt states was instrumental in getting him to come to the hospital to be \"checked out.\"   Family of origin is available for support:  As stated above  Other support available: As stated above  Gaps in support system:   Limited local support  Patient is caregiver to:  None     Provider Information   Primary Care Physician:  No Ref-Primary, Physician   None   Clinic:  No address on file      :  Pt states that she has a  through [facility] (pt is enrolled in [facility]) but he does not know the name or phone number of the [facility]     Financial   Income Source:  Social Security  Financial " Concerns:  None stated  Insurance:    Payor/Plan Subscriber Name Rel Member # Group #   UCARE - UCARE MSHO DUNPHY,JOHN C [facility] [id number] [facility]      [address]       Discharge Plan   Patient and family discharge goal:  Pt was to know his diagnosis. Pt voices agreement that a rehab stay is needed. Pt is hopeful that his condition is treatable so that he can return to home in the near future  Provided education on discharge plan:  YES  Patient agreeable to discharge plan:  YES  A list of Medicare Certified Facilities was provided  (with star ratings)to the patient and/or family to encourage patient choice. Patient's choices for facility are:  Pt prefers placement close to home in a SNF that has a 4/5 or 5/5 overall rating.   Preferences include:  [facility], [facility] and [facility].  SW will delay referral till diagnosis and treatment plan known.  Will NH provide Skilled rehabilitation or complex medical:  YES  General information regarding anticipated insurance coverage and possible out of pocket cost was discussed. Patient and patient's family are aware patient may incur the cost of transportation to the facility, pending insurance payment: YES  Barriers to discharge:  Medical readiness    Discharge Recommendations   Anticipated Disposition:  TCU placement      Additional comments   - pt is not a vet  - pt states that his sister may be coming to [facility] soon to see him  - SW will follow for discharge planning.    SHERIDAN Castro  Social Work, 6A  Phone:  [phone]  Pager:  [phone]  9/18/2020

## 2020-09-18 NOTE — PLAN OF CARE
Status: Pt here for BUE weakness since 2018. PMH of RAKEL on CPAP, HTN, and hypokalemia.   Vitals: VSS ex HTN within parameters. 2L oxymask when napping. CCM.  Neuros: A&O x4. RUE 2/5, LUE 3/5. Right grasp weaker than left. Slight N/T to BLE at baseline.  IV: PIV SL x2.  Resp/trach: LS clear. Denies SOB. CPAP overnight and 2L oxymask when napping. Beard and hair shaved this shift for better fit with CPAP.  Diet: Regular, good intake. Needs assistance d/t BUE weakness.  Bowel status: BS+. Last BM 9/16.  : Voids spontaneously via bedside urinal. Voided 175 at 2000 with PVR of 330. Voided another 125 at 2045. Will continue to encourage voiding.   Skin: Dry spots on face and scalp. Redness in RUE armpit and groin.   Pain: Denies.  Activity: Assist of two, GB. Pt reports feeling more unsteady than normal. Needs reminders to shift weight in bed, but verbalized knowledge of bed sores and why he needs to shift weight.  Social: No visitors this shift.  Plan: Neuropsych evaluation and EMG tomorrow. Continue to monitor and follow plan of care.

## 2020-09-18 NOTE — PLAN OF CARE
Discharge Planner OT   Patient plan for discharge: TCU  Current status: Administered MoCA blind 7.1 d/t pt motor impairments limiting ability to complete standard MoCA. Scored 18/22, at or above 18 indicates normative range. Facilitated activity to promote UB strength and proprioceptive input in BUE. Required modA for placing L forearm on side of bed. Required maxA to place forearm on side of bed. Completed 7 reps to each side. Educated on importance of slow and controlled movements to prevent injury. Discussed discharge plan.   Barriers to return to prior living situation: medical status, BUE weakness, lives alone  Recommendations for discharge: TCU, potential for LTC  Rationale for recommendations: Pt is below baseline PLOF and would benefit from continued skilled occupational therapy services to facilitate independence w/ ADLs.       Entered by: Zoya Cabrera 09/18/2020 3:06 PM

## 2020-09-18 NOTE — PROGRESS NOTES
"   09/18/20 0800   Quick Adds   Type of Visit Initial PT Evaluation   Living Environment   Lives With alone   Living Arrangements apartment   Home Accessibility stairs to enter home;stairs within home   Number of Stairs, Main Entrance 4   Stair Railings, Main Entrance railings on both sides of stairs   Number of Stairs, Within Home, Primary other (see comments)  (13 stairs to basement )   Stair Railings, Within Home, Primary railings on both sides of stairs   Transportation Anticipated family or friend will provide  (States that he will primarily rely on neighbor)   Living Environment Comment Pt lives alone in apartment. 4 stairs to enter, laundry in basement, tub/shower in home although patient states that he has not been bathing for the past 6 months d/t worsening UE weakness    Self-Care   Usual Activity Tolerance moderate   Current Activity Tolerance fair   Regular Exercise No   Equipment Currently Used at Home none   Activity/Exercise/Self-Care Comment Patient states that he is IND at baseline but reports that he has not left his house in approx. 6 months and has not ambulated farther than to the mailbox in that time.    Functional Level Prior   Ambulation 0-->independent   Transferring 0-->independent   Toileting 0-->independent   Bathing 0-->independent   Communication 0-->understands/communicates without difficulty   Cognition 0 - no cognition issues reported   Fall history within last six months no   Which of the above functional risks had a recent onset or change? ambulation;transferring;toileting;bathing;dressing;eating   General Information   Onset of Illness/Injury or Date of Surgery - Date 09/16/20   Referring Physician Darwin Kelsey, DO   Patient/Family Goals Statement Patient states that he would like to return to living IND   Pertinent History of Current Problem (include personal factors and/or comorbidities that impact the POC) Per chart review: \"69 year old male with a 2 year history " "of progressive upper extremity weakness. Found to have cervical canal stenosis with myelomalacia. Admitted to neurology for neurological workup, as well as placement due to him not being able to care for himself anymore. His neurological exam is not completely convincing to be related to his cervical stenosis, as his legs and sensation are spared.\"   Precautions/Limitations fall precautions   Heart Disease Risk Factors High blood pressure;Lack of physical activity;Overweight;Age;Gender   General Observations Patient is pleasant and agreeable, likes to chat and often go off on a tangent during conversation   General Info Comments Activity: Up [place]   Cognitive Status Examination   Orientation orientation to person, place and time   Level of Consciousness alert   Follows Commands and Answers Questions able to follow multistep instructions;100% of the time   Personal Safety and Judgment impulsive   Memory intact   Cognitive Comment Slightly impulsive with mobility, requires cueing prevent premature mobilization.     Pain Assessment   Patient Currently in Pain No   Integumentary/Edema   Integumentary/Edema no deficits were identifed   Posture    Posture Forward head position;Protracted shoulders   Range of Motion (ROM)   ROM Comment BUE PROM WFL    Strength   Strength Comments RUE 1-2/5 globally, LUE 2/5 globally, WFL BLE   Bed Mobility   Bed Mobility Comments SBA supine>sit EOB   Transfer Skills   Transfer Comments Sit<>stand CGA   Gait   Gait Comments CGA and w/c follow for ambulation   Balance   Balance Comments Demonstrates adequate static standing balance. Unsteadiness observed with gait, one minor LOB while ambulating w/Min A x1 to correct.   Sensory Examination   Sensory Perception Comments Patient states he has difficulty differentiating sharp and dull. Other sensation intact BUE   Coordination   Coordination Comments Difficulty with coordination of BUEs   Muscle Tone   Muscle Tone no deficits were identified " "  General Therapy Interventions   Planned Therapy Interventions bed mobility training;gait training;neuromuscular re-education;strengthening;transfer training;risk factor education;home program guidelines;progressive activity/exercise   Clinical Impression   Criteria for Skilled Therapeutic Intervention yes, treatment indicated   PT Diagnosis Decreased endurance and activity tolerance, impaired balance   Influenced by the following impairments General deconditioning, BUE weakness, current medical status, decreased aerobic endurance   Functional limitations due to impairments Bed mobility, sit<>stand transfers, gait, stairs   Clinical Presentation Evolving/Changing   Clinical Presentation Rationale Clinical observation   Clinical Decision Making (Complexity) Moderate complexity   Therapy Frequency 5x/week   Predicted Duration of Therapy Intervention (days/wks) 2 weeks   Anticipated Discharge Disposition Transitional Care Facility   Risk & Benefits of therapy have been explained Yes   Patient, Family & other staff in agreement with plan of care Yes   [facility] TM \"6 Clicks\"   2016, Trustees of [facility], under license to [employer].  All rights reserved.   6 Clicks Short Forms Basic Mobility Inpatient Short Form   [facility]  \"6 Clicks\" V.2 Basic Mobility Inpatient Short Form   1. Turning from your back to your side while in a flat bed without using bedrails? 4 - None   2. Moving from lying on your back to sitting on the side of a flat bed without using bedrails? 3 - A Little   3. Moving to and from a bed to a chair (including a wheelchair)? 3 - A Little   4. Standing up from a chair using your arms (e.g., wheelchair, or bedside chair)? 3 - A Little   5. To walk in hospital room? 3 - A Little   6. Climbing 3-5 steps with a railing? 2 - A Lot   Basic Mobility Raw Score (Score out of 24.Lower scores equate to lower levels of function) 18   Total Evaluation Time   Total Evaluation " Time (Minutes) 15

## 2020-09-19 LAB
ANION GAP SERPL CALCULATED.3IONS-SCNC: 6 MMOL/L (ref 3–14)
BUN SERPL-MCNC: 10 MG/DL (ref 7–30)
CALCIUM SERPL-MCNC: 8.3 MG/DL (ref 8.5–10.1)
CHLORIDE SERPL-SCNC: 110 MMOL/L (ref 94–109)
CO2 SERPL-SCNC: 24 MMOL/L (ref 20–32)
CREAT SERPL-MCNC: 0.64 MG/DL (ref 0.66–1.25)
GFR SERPL CREATININE-BSD FRML MDRD: >90 ML/MIN/{1.73_M2}
GLUCOSE BLDC GLUCOMTR-MCNC: 101 MG/DL (ref 70–99)
GLUCOSE BLDC GLUCOMTR-MCNC: 103 MG/DL (ref 70–99)
GLUCOSE BLDC GLUCOMTR-MCNC: 111 MG/DL (ref 70–99)
GLUCOSE BLDC GLUCOMTR-MCNC: 114 MG/DL (ref 70–99)
GLUCOSE SERPL-MCNC: 111 MG/DL (ref 70–99)
POTASSIUM SERPL-SCNC: 3.4 MMOL/L (ref 3.4–5.3)
SODIUM SERPL-SCNC: 140 MMOL/L (ref 133–144)

## 2020-09-19 PROCEDURE — 00000146 ZZHCL STATISTIC GLUCOSE BY METER IP

## 2020-09-19 PROCEDURE — 12000001 ZZH R&B MED SURG/OB UMMC

## 2020-09-19 PROCEDURE — 40000275 ZZH STATISTIC RCP TIME EA 10 MIN

## 2020-09-19 PROCEDURE — 25000128 H RX IP 250 OP 636: Performed by: STUDENT IN AN ORGANIZED HEALTH CARE EDUCATION/TRAINING PROGRAM

## 2020-09-19 PROCEDURE — 36415 COLL VENOUS BLD VENIPUNCTURE: CPT | Performed by: STUDENT IN AN ORGANIZED HEALTH CARE EDUCATION/TRAINING PROGRAM

## 2020-09-19 PROCEDURE — 80048 BASIC METABOLIC PNL TOTAL CA: CPT | Performed by: STUDENT IN AN ORGANIZED HEALTH CARE EDUCATION/TRAINING PROGRAM

## 2020-09-19 PROCEDURE — 94660 CPAP INITIATION&MGMT: CPT

## 2020-09-19 PROCEDURE — 25000132 ZZH RX MED GY IP 250 OP 250 PS 637: Performed by: STUDENT IN AN ORGANIZED HEALTH CARE EDUCATION/TRAINING PROGRAM

## 2020-09-19 RX ADMIN — ENOXAPARIN SODIUM 40 MG: 40 INJECTION SUBCUTANEOUS at 09:52

## 2020-09-19 RX ADMIN — LISINOPRIL 30 MG: 20 TABLET ORAL at 07:49

## 2020-09-19 ASSESSMENT — ACTIVITIES OF DAILY LIVING (ADL)
ADLS_ACUITY_SCORE: 18
ADLS_ACUITY_SCORE: 19
ADLS_ACUITY_SCORE: 18
ADLS_ACUITY_SCORE: 19

## 2020-09-19 NOTE — PROGRESS NOTES
Social Work Services Progress Note    Hospital Day: 4  Date of Initial Social Work Evaluation:  9-  Collaborated with:  Chart review, Neurology team    Data:  Pt is a 69 year old male who has been living independently and is in agreement with SW assisting in referral to TCU.     Intervention:  Neurology team contacted sw, pt medically ready for discharge. Referrals sent via [employer] to  TCU 1st choice, [facility], [facility].     Assessment:  VIVIENNE assisting pt with preferred TCU choices.     Plan:    Anticipated Disposition:  Facility:  D    Barriers to d/c plan:  Medical stability, bed availability.    Follow Up:   VIVIENNE to continue to assist with discharge planning.      Lenore SWARTZ  Social Work On call   [phone]  4a, 4c, 4e, 5a, 5b Pager [phone]  6a,6b,6c,6d Pager [phone]  5c,7a,7b,7c,7d Pager [phone]    1600-midnight  Pager [phone]

## 2020-09-19 NOTE — PLAN OF CARE
Status: Pt admitted for evaluation of bilateral UE weakness since 2018. Pt with h/o RAKEL on CPAP, HTN, hypokalemia.   Vitals: VSS ex HTN within parameters. 3L oxymask during day. C-pap on overnight. On CCM.   Neuros: AxOx4. RUE 2/5, LUE 3/5, BLE 5/5. Decreased sensation in BLE at baseline.    IV: PIV SL.  Resp/trach: LS clear.     Diet: Regular diet. Needs assistance w/ ordering and feeding.  Bowel status: BS+. No BM this shift.   : Voiding w/ some post void rentention. Encourage bedside urinal use.    Skin: RUE armpit with blanchable redness. Bilateral groin and scrotum with blanchable erythema.   Pain: Denies  Activity: Up A2 and GB. Pt can be unsteady at times.    Plan: Therapy recommending TCU vs LTC when medically ready, continue to monitor and follow POC.

## 2020-09-19 NOTE — PLAN OF CARE
Status: Pt admitted for evaluation of bilateral UE weakness since 2018. Pt witht h/o RAKEL on CPAP, HTN, hypokalemia  Vitals: VSS ex HTN within parameters. 2L oxymask when napping/sleeping. On CCM.   Neuros: AxOx4. [place] bilaterally. RUE 2/5, LUE 3/5, BLE 5/5. Decreased sensation in BLE at baseline.   IV: PIV SL.  Resp/trach: LS clear. CPAP at night  Diet: Regular diet. Needs assistance w/ ordering and feeding.   Bowel status: BS+. No BM this shift.   : Voiding w/ some post void rentention. Encourage bedside urinal use.   Skin: RUE armpit with blanchable redness. Bilateral groin and scrotum with blanchable erythema.  Pain: denied pain.  Activity: up A2 and GB. Pt can be unsteady at times.  Social: pt family updated on the phone. States they will be in town on Monday to visit.    Plan: Awaiting TCU placement w/ outpatient ALS clinic follow-up.

## 2020-09-19 NOTE — PROGRESS NOTES
"[facility]  Neurology Progress Note      John C Dunphy  [id number]  09/19/2020    Interval events:  No events overnight.     Objective:  Physical Examination   Vitals: BP (!) 177/92 (BP Location: Left arm)   Pulse 77   Temp 98.6  F (37  C) (Axillary)   Resp 18   Ht 1.676 m (5' 6\")   Wt 98.4 kg (216 lb 14.4 oz)   SpO2 94%   BMI 35.01 kg/m    Mental status: Awake, alert, attentive, oriented to self, time, place, and circumstance. Language is fluent and coherent with intact comprehension of complex commands, naming and repetition.   Cranial nerves: VFF, PERRL, conjugate gaze, EOMI, facial sensation intact, face symmetric, shoulder shrug strong, tongue/uvula midline, no dysarthria.   Motor: hypotonia of both UEs, RT>LT. No abnormal movements.     Right Left   Neck flexion 5 5   Neck extension: 5 5   Shoulder abduction:           1 3-   Elbow extension: 2 3   Elbow flexion:            0 2+   Wrist flexion:            3 4   Wrist extension:            2 3   Finger flexion 3- 4   FDI 1 2   Hip flexion 5 5   Hip extension 5 5   Knee flexion 5 5   Knee extension 5 5   Dorsiflexion 5 5   Planter flexion 5 5      Reflexes: areflexic and symmetric biceps, brachioradialis, triceps. 2+ reflexes in patellae, and achilles bilaterally. Toes down going b/l.  Sensory: intact to light touch in all 4 extremities. Intact pinprick in all ext. Intact sense of position and Vibration.  Coordination: FNF can't be done because of the UEs weakness.   Gait: deferred     Pertinent Studies    MRI brain without contrast:   IMPRESSION:    1. Evidence of chronic small vessel change throughout the deep white  matter of both cerebral hemispheres with no acute intracranial  Findings.     MRI cervical spines:  IMPRESSION:  1. Severe degenerative changes of the cervical spine as detailed  above.  2. Deformation of the spinal cord by posterior disc herniations at the  C2-C3, C3-C4 and C4-C5 levels as detailed above. Small " focus of  presumed myelomalacia in the right anterolateral aspect of the  cervical spinal cord at the C4-C5 level likely due to chronic  impingement.  3. Moderate to severe neural foraminal stenosis on the left at C3-C4,  bilaterally at C4-C5, on the left at C5-C6 and on the left at C6-C7.  -----------------------------------------  Nerve Conduction & EMG 9/18/20   Interpretation:     Abnormal study. There is electrodiagnostic evidence of widespread denervation changes affecting the right cervical, thoracic, lumbosacral and possibly bulbar (genioglossus) region. While cervical radiculomyelopathy technically cannot be fully differentiated from motor neuron disease on electrodiagnostic grounds alone, the findings of 1) widespread denervation in multiple regions, 2) extensive fasciculations, 3) lack of or minimal reinnervation changes at the right upper extremity muscles, and 4) low split hand index ratio (<5.2) favor motor neuron disease. Clinical correlation is required.   The study also showed an incidental right ulnar neuropathy at the elbow.   -------------------------------------------------------------------------  Assessment and Plan:  69 year old male h/o RAKEL on CPAP, HTN, hypokalemia who presents with bilateral UE weakness since 2018. The right is weaker than the left. No LE weakness. Has reduced sensation in both UEs and LEs. Areflexia in both UEs and preserved reflexes in both LEs. MRI brain with diffuse brain atrophy. MRI cervical spines with severe degenerative changes, foraminal narrowing at multiple levels and cord compression at C4-5 level with small encephalomalacia at that level. The C4-5 cervical disc and compression of multiple roots can explain the UE weakness but the absence of LE weakness and urinary incontinence is not typical. The weakness is severe with fasciculation and the sensory changes are not clear, this raises the possibility of motor neuron disease. The fact that he did not seek help  for two years and got along with his illness in the setting of inability to eat or take care of himself suggesting cognitive dysfunction and poor judgment. EMG was done 9/18(report copied above) with widespread denervation in multiple regions and Fasciculations, suggestive of motor neuron disorder, most likely ALS.     # Progressive Bilateral UE weakness  # Motor neuron dis  # Possible ALS  - EMG with evidence of motor neuron dis, mot likely ALS  - PT and OT evaluation  - Neurosurgery signed off, no intervention indicated  - Neuropsychology service recommended outpatient follow up for a neuropsychological evaluation  - Needs to follow outpatient with ALS clininc     # Hypertension  Pt had hx of hypokalemia, will switch Lisinopril-hydrochlorothiazide to Lisinopril  - Discontinue PTA Lisinopril-hydrochlorothiazide 20-12.5 mg daily  - Start Lisinopril 30 mg daily       #DM type 2  Hgb A1c 6.8, not on medication at home  - Medium intensity sliding scale      # Chronic hypokalemia   - Electrolyte replacement protocol  - Discontinue Hydrochlorothiazide     # RAKEL   - CPAP      Diet: Regular diet  DVT ppx: Lovenox  CODE STATUS: Full  Disposition: Awaiting placement, TCU with potential for LTC.     Patient was seen and discussed with attending doctor, Dr. Kelsey.    Nicho Espinal MD  Neurology Resident PGY2  Pager [phone]

## 2020-09-19 NOTE — PLAN OF CARE
Status: Pt admitted for evaluation of bilateral UE weakness since 2018. Pt witht h/o RAKEL on CPAP, HTN, hypokalemia.   Vitals: VSS ex HTN within parameters. 3L oxymask when napping/sleeping. On CCM.   Neuros: AxOx4. RUE 2/5, LUE 3/5, BLE 5/5. Decreased sensation in BLE at baseline.    IV: PIV SL.  Resp/trach: LS clear. Uses CPAP at home.      Diet: Regular diet. Needs assistance w/ ordering and feeding. Good appetite.    Bowel status: BS+. No BM this shift.   : Voiding w/ some post void rentention. Encourage bedside urinal use.    Skin: RUE armpit with blanchable redness. Bilateral groin and scrotum with blanchable erythema.   Pain: Denied pain.  Activity: Up A2 and GB. Pt can be unsteady at times.    Social: Pt updated family and neighbors about new diagnosis.   Plan: Therapy recommending TCU vs LTC when medically ready, continue to monitor and follow POC. Diagnosed with motor neuron disease by provider team this shift, has been emotional since, support provided.

## 2020-09-19 NOTE — DISCHARGE SUMMARY
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 Dunphy is a 69 year old man h/o RAKEL on CPAP, HTN, hypokalemia who presents with bilateral UE weakness.   The weakness started in the right UE in the summer of . He noticed that he can't reach the top of his head. This weakness progressively got worse to the point that he is no longer able to lift his arm at all. His hand  and the flexion and extension of the arm were also progressively weakened and now he is not able to write or feed himself. The left UE started a year ago and is progressively getting weaker. Still better than the right UE but is significantly weak. He is not able to coomb his hair or brush his teeth or feed himself. He eats by his mouth from the plate with very little help from the left hand.   He denies weakness to his legs but he is very cautious walking or walking up or down stairs. He was barely able to go outside in the past year, and had significant difficulty managing iADLs and ADLs. He noticed that his walking is very slow and steps are short. He has twitches of the muscles of the front of the thighs bilaterally but did not notice any twitches in the back or arms.  He denies any sensory changes to the arms but endorsed numbness of the bottom of both feet. He fell downstairs in 2018 but he does not remember whether  this happened before or after the weakness.      He presented to care after the urging of a neighbor. He was admitted because he is no longer able take care of himself.     Hospital Course:  # Progressive Bilateral UE weakness  # Motor neuron dis  # Possible ALS  - EMG with evidence of motor neuron disease, most likely ALS  - Neurosurgery signed off, no intervention indicated  - Neuropsychology service recommended outpatient follow up for a neuropsychological evaluation  - Needs to follow outpatient with ALS (motor neuron disease) clinic     # Hypertension  Pt had hx of hypokalemia, Lisinopril-hydrochlorothiazide was switched to Lisinopril, with stabilization of potassium. Amlodipine was added for additional coverage.   - Discontinued PTA Lisinopril-hydrochlorothiazide 20-12.5 mg daily  - Started on Lisinopril 40 mg daily  - Started on Amlodipine 2.5 mg daily   - Recommend follow up with PCP for additional medication titration     #DM type 2  Hgb A1c 6.8, not on medication at home  - Recommend follow up with PCP      # Chronic hypokalemia   Resolving with discontinuation of Hydrochlorothiazide. Recommned follow up with PCP.     # RAKEL   - CPAP at night (45mcZ50 and 25% FiO2)    Pertinent Investigations:     MRI brain without contrast:   IMPRESSION:    1. Evidence of chronic small vessel change throughout the deep white  matter of both cerebral hemispheres with no acute intracranial  Findings.     MRI cervical spines:  IMPRESSION:  1. Severe degenerative changes of the cervical spine as detailed  above.  2. Deformation of the spinal cord by posterior disc herniations at the  C2-C3, C3-C4 and C4-C5 levels as detailed above. Small focus of  presumed myelomalacia in the right anterolateral aspect of the  cervical spinal cord at the C4-C5 level likely due to chronic  impingement.  3. Moderate to severe neural foraminal stenosis on the left at C3-C4,  bilaterally at C4-C5, on the left at C5-C6 and on the left at  "C6-C7.  -----------------------------------------  Nerve Conduction & EMG 9/18/20   Interpretation:     Abnormal study. There is electrodiagnostic evidence of widespread denervation changes affecting the right cervical, thoracic, lumbosacral and possibly bulbar (genioglossus) region. While cervical radiculomyelopathy technically cannot be fully differentiated from motor neuron disease on electrodiagnostic grounds alone, the findings of 1) widespread denervation in multiple regions, 2) extensive fasciculations, 3) lack of or minimal reinnervation changes at the right upper extremity muscles, and 4) low split hand index ratio (<5.2) favor motor neuron disease. Clinical correlation is required.   The study also showed an incidental right ulnar neuropathy at the elbow.     Consultations:    PT/OT, neurosurgery, PMR, WOC nurse, neuropsychology    Recommendations and Follow-up:  - Follow up with ALS clinic  - Follow up outpatient with neuropsychology clinic  - Establish care with a PCP      Discharge physical examination:   BP (!) 159/87 (BP Location: Left arm)   Pulse 55   Temp 97.7  F (36.5  C) (Oral)   Resp 16   Ht 1.676 m (5' 6\")   Wt 98.4 kg (216 lb 14.4 oz)   SpO2 95%   BMI 35.01 kg/m    Physical Exam:  Constitutional: Alert. Lying in bed comfortably. No acute distress.   Head: Atraumatic, normocephalic.  ENT: Moist mucous membranes. No sinus drainage or epistaxis  Cardiovascular: Appears warm, well-perfused, and non-toxic.  Respiratory: No increased work of breathing, no accessory muscle use.     Neurologic: Awake, alert, oriented to self/place/situation. Asking appropriate questions. Speech is fluent, able to comprehend, and follows commands. Pupils equal and reactive. No gaze preference or deviation, eyes move conjugately. Nasolabial folds symmetric. Hearing grossly intact to conversation.     Right arm with less than antigravity strength in extensors and flexors, left arm with antigravity and some " anti-examiner. Bilateral lowers with antigravity and antiexaminer. No tremor or fasics observed.      Motor exam:     Manual muscle testing revealed the following MRC grade muscle power:   Right Left   Shoulder abduction:  5 5   Elbow Flexion: 2 4   Elbow Extension:  2 4   Wrist Extension:  2 4   Finger Extension:  5 3   FDI 3 3   APB 3 5-   Finger Flexion 4 5-   Wrist Flexion 2 5   Hip Flexion 5- 5-   Knee Extension 5 5   Knee Flexion 5 5   Dorsiflexion 5 5   Plantar flexion 5 5     Complex motor skills and coordination impaired due to weakness. Finger-nose- finger impaired by weakness.      Discharge Medications:  Current Discharge Medication List      START taking these medications    Details   amLODIPine (NORVASC) 2.5 MG tablet Take 1 tablet (2.5 mg) by mouth daily  Qty: 90 tablet, Refills: 0    Associated Diagnoses: Benign essential hypertension      lisinopril (ZESTRIL) 40 MG tablet Take 1 tablet (40 mg) by mouth daily  Qty: 90 tablet, Refills: 0    Associated Diagnoses: Benign essential hypertension         STOP taking these medications       lisinopril-hydrochlorothiazide (ZESTORETIC) 20-12.5 MG tablet Comments:   Reason for Stopping:               Discharge follow up and instructions:     CPAP DEVICE    CPAP in place while sleeping. Keep SpO2>90%. PEEP +10cm H2O and FiO2 25%     Neurology Adult Referral      Internal Medicine Referral      NEUROPSYCHOLOGY REFERRAL      General info for SNF    Length of Stay Estimate: Short Term Care: Estimated # of Days <30  Condition at Discharge: Stable  Level of care:skilled   Rehabilitation Potential: Fair  Admission H&P remains valid and up-to-date: Yes  Recent Chemotherapy: N/A  Use Nursing Home Standing Orders: Yes     Mantoux instructions    Give two-step Mantoux (PPD) Per Facility Policy Yes     Reason for your hospital stay    You were admitted to the hospital for progressive weakness for the past few years that is now limiting how you are able to complete your  daily activities and live independently. You had brain imaging as well as a electromyography (EMG) test with the neurology team while you were in the hospital. These tests showed evidence of a Motor Neuron Disease. The most well known type of motor neuron disease is amyotrophic lateral sclerosis, and this is most likely your diagnosis. Outpatient follow up in the motor neuron disease neurology clinic is recommended once you leave the hospital, at that time you can establish care and discuss options for symptom management.     You should also follow up with a primary care provider for blood pressure and diabetes (some changes to medications were made while you were in the hospital, your old blood pressure medicine was stopped and replaced with lisinopril and amlodipine, due to the side effect of low potassium from your previous medicine). You should also follow up with neuropsychology, they saw you in the hospital and recommended an outpatient referral.     Activity - Up ad corrie     Additional Discharge Instructions    Call your care provider if you have fever, new cough, chest pain, or other concerning symptoms. Call neurology clinic if you have increasing weakness limiting your daily activities, or if you have trouble swallowing or speaking.    Call 911 or go to the Emergency Room if you have any life or limb threatening symptoms.     Follow Up and recommended labs and tests    Follow up with primary care provider in 4 weeks. Recommend follow up for blood pressure management and diabetes care.     Follow up with Motor Neuron Disease Neurology clinic in 2-4 weeks.     Physical Therapy Adult Consult    Evaluate and treat as clinically indicated.    Reason:  Weakness secondary to motor neuron disease     Occupational Therapy Adult Consult    Evaluate and treat as clinically indicated.    Reason:  Weakness and impaired ADLs and iADLs (including severe hand weakness) secondary to motor neuron disease     Speech Language  Path Adult Consult    Evaluate and treat as clinically indicated.    Reason:  motor neuron disease     Fall precautions     Advance Diet as Tolerated    Follow this diet upon discharge: Orders Placed This Encounter      Combination Diet Regular Diet Adult       Patient seen and discussed with Dr. Chinchilla.      Nicho Espinal MD  Neurology Resident PGY2  Pager [phone]     Yaneth Melendez MD  PGY2 Neurology  [phone]    Attending physician: I saw and evaluated the patient on the day of discharge and I agree with the findings and plan of care as per Dr. Melendez above. Briefly, the patient is a 69 year old man with a 2.5 year history of first right arm, then left arm then bilateral lower limb weakness. He may have some minor changes in speech as well. No convincing upper motor neuron signs other than ?slight hint of spastic dysarthria.    EMG was suggestive of widespread denervation with limited renervation in cervical, lumbar and bulbar segments, motor neuron disease was favored. The presentation currently is technically not diagnostic of amyotrophic lateral sclerosis; due to lack of significant upper motor neuron involvement presentation fits most closely with progressive muscular atrophy phenotype, although eventual evolution to ALS is common in this condition.    Patient will be discharging to a transitional care facility for work on adaptive strategies; long term living arrangement to be determined. Follow up is expected to be through the ALS clinic at the [facility].    Dane Resendez MD   of Neurology

## 2020-09-20 LAB
ANION GAP SERPL CALCULATED.3IONS-SCNC: 4 MMOL/L (ref 3–14)
BUN SERPL-MCNC: 13 MG/DL (ref 7–30)
CALCIUM SERPL-MCNC: 8.4 MG/DL (ref 8.5–10.1)
CHLORIDE SERPL-SCNC: 112 MMOL/L (ref 94–109)
CO2 SERPL-SCNC: 24 MMOL/L (ref 20–32)
CREAT SERPL-MCNC: 0.58 MG/DL (ref 0.66–1.25)
GFR SERPL CREATININE-BSD FRML MDRD: >90 ML/MIN/{1.73_M2}
GLUCOSE BLDC GLUCOMTR-MCNC: 102 MG/DL (ref 70–99)
GLUCOSE BLDC GLUCOMTR-MCNC: 104 MG/DL (ref 70–99)
GLUCOSE BLDC GLUCOMTR-MCNC: 106 MG/DL (ref 70–99)
GLUCOSE BLDC GLUCOMTR-MCNC: 149 MG/DL (ref 70–99)
GLUCOSE BLDC GLUCOMTR-MCNC: 79 MG/DL (ref 70–99)
GLUCOSE SERPL-MCNC: 110 MG/DL (ref 70–99)
PLATELET # BLD AUTO: 181 10E9/L (ref 150–450)
POTASSIUM SERPL-SCNC: 3.4 MMOL/L (ref 3.4–5.3)
SODIUM SERPL-SCNC: 140 MMOL/L (ref 133–144)

## 2020-09-20 PROCEDURE — 40000275 ZZH STATISTIC RCP TIME EA 10 MIN

## 2020-09-20 PROCEDURE — 36415 COLL VENOUS BLD VENIPUNCTURE: CPT | Performed by: PSYCHIATRY & NEUROLOGY

## 2020-09-20 PROCEDURE — 94660 CPAP INITIATION&MGMT: CPT

## 2020-09-20 PROCEDURE — 80048 BASIC METABOLIC PNL TOTAL CA: CPT | Performed by: PSYCHIATRY & NEUROLOGY

## 2020-09-20 PROCEDURE — 85049 AUTOMATED PLATELET COUNT: CPT | Performed by: PSYCHIATRY & NEUROLOGY

## 2020-09-20 PROCEDURE — 25000132 ZZH RX MED GY IP 250 OP 250 PS 637: Performed by: COUNSELOR

## 2020-09-20 PROCEDURE — 25000128 H RX IP 250 OP 636: Performed by: STUDENT IN AN ORGANIZED HEALTH CARE EDUCATION/TRAINING PROGRAM

## 2020-09-20 PROCEDURE — 12000001 ZZH R&B MED SURG/OB UMMC

## 2020-09-20 PROCEDURE — 00000146 ZZHCL STATISTIC GLUCOSE BY METER IP

## 2020-09-20 RX ORDER — LISINOPRIL 10 MG/1
10 TABLET ORAL ONCE
Status: DISCONTINUED | OUTPATIENT
Start: 2020-09-20 | End: 2020-09-20 | Stop reason: CLARIF

## 2020-09-20 RX ORDER — LISINOPRIL 20 MG/1
40 TABLET ORAL DAILY
Status: DISCONTINUED | OUTPATIENT
Start: 2020-09-20 | End: 2020-09-25 | Stop reason: HOSPADM

## 2020-09-20 RX ADMIN — LISINOPRIL 40 MG: 20 TABLET ORAL at 09:02

## 2020-09-20 RX ADMIN — ENOXAPARIN SODIUM 40 MG: 40 INJECTION SUBCUTANEOUS at 09:39

## 2020-09-20 ASSESSMENT — VISUAL ACUITY
OU: NORMAL ACUITY
OU: NORMAL ACUITY

## 2020-09-20 ASSESSMENT — ACTIVITIES OF DAILY LIVING (ADL)
ADLS_ACUITY_SCORE: 19
ADLS_ACUITY_SCORE: 18
ADLS_ACUITY_SCORE: 19
ADLS_ACUITY_SCORE: 18

## 2020-09-20 NOTE — PROGRESS NOTES
"Social Work Services Progress Note    Hospital Day: 5  Date of Initial Social Work Evaluation:  9/18/2020  Collaborated with:  Mr. Dunphy    Data:  I saw Mr. Dunphy this morning to complete the IMM form. He informed me that his relatives (including a sister) were arriving from out of state, and would likely be visiting him tomorrow. Mr.Dunphy had many good questions related to the progression of his illness. He also wanted to know about COVID rules in a TCU, transportation resources to get from a TCU to appointments, such as the ALS Clinic, and other related issues.     Intervention:  Active listening and supportive counseling. Answering his questions, when able. Referred him back to the medical team for questions related to prognosis. Completion of IMM, by witnessing his \"X\", as unable to use his hand to write. Leaving a voice mail for MICKEY Bowers , for follow up tomorrow.     Assessment:  Mr.Dunphy was pleasant and asking good questions related to his discharge plans. He is in agreement with TCU placement, and is aware that there was not a bed available today. He was interested in this writer's suggestions that he and his family meet with the unit  after their arrival, to address his questions related to immediate and potentially more long term care needs.     Plan:    See Lenore Kern's social work notes for discharge planning updates. The 6A , Elissa, will resume following tomorrow for disposition plans.     Su [facility]     "

## 2020-09-20 NOTE — PLAN OF CARE
Status: Pt admitted for evaluation of bilateral UE weakness since 2018. Pt witht h/o RAKEL on CPAP, HTN, hypokalemia.   Vitals: VSS ex HTN within parameters. On CCM.   Neuros: AxOx4. RUE 2/5, LUE 3/5, BLE 5/5. Decreased sensation in BLE at baseline.    IV: PIV SL.   Resp/trach: LS clear. Uses CPAP at home.      Diet: Regular diet. Needs assistance w/ ordering and feeding. Good appetite.    Bowel status: BS+. Had one BM this shift.   : Voiding w/ some post void rentention (less than 300cc). Encourage bedside urinal use.    Skin: RUE armpit with blanchable redness. Bilateral groin and scrotum with blanchable erythema.   Pain: Denied pain.  Activity: Up A2 and GB. Pt can be unsteady at times.  Walked around unit x1 this shift.   Social: Pt updated family and neighbors. Brother or sister should be visiting tomorrow, coming in from out of state.   Plan: Diabetes education completed this shift. Therapy recommending TCU vs LTC. Pt is medically ready but awaiting placement, continue to monitor and follow POC.

## 2020-09-20 NOTE — PLAN OF CARE
Status: Pt admitted for evaluation of bilateral UE weakness since 2018. Pt witht h/o RAKEL on CPAP, HTN, hypokalemia.   Vitals: VSS ex HTN within parameters. On CCM.   Neuros: AxOx4. RUE 2/5, LUE 3/5, BLE 5/5. Decreased sensation in BLE at baseline.    IV: PIV SL.   Resp/trach: LS clear. Uses CPAP at home.      Diet: Regular diet. Needs assistance w/ ordering and feeding. Good appetite.    Bowel status: BS+. No BM this shift  : Voiding w/ some post void rentention (less than 300cc). Encourage bedside urinal use.    Skin: RUE armpit with blanchable redness. Bilateral groin and scrotum with blanchable erythema.   Pain: Denied pain.  Activity: Up A2 and GB. Pt can be unsteady at times.     Social:  Brother/sister should be visiting tomorrow, coming in from out of state.   Plan: Diabetes education completed this shift. Therapy recommending TCU vs LTC. Pt is medically ready but awaiting placement, continue to monitor and follow POC.

## 2020-09-20 NOTE — PROGRESS NOTES
"[facility]  Neurology Progress Note      John C Dunphy  [id number]  09/20/2020    Interval events:  No events overnight. Ongoing hypertension with sbp up to 191.     Objective:  Physical Examination   Vitals: BP (!) 150/93   Pulse 61   Temp 98.2  F (36.8  C) (Oral)   Resp 20   Ht 1.676 m (5' 6\")   Wt 98.4 kg (216 lb 14.4 oz)   SpO2 97%   BMI 35.01 kg/m    Mental status: Awake, alert, attentive, oriented to self, time, place, and circumstance. Language is fluent and coherent with intact comprehension of complex commands, naming and repetition.   Cranial nerves: VFF, PERRL, conjugate gaze, EOMI, facial sensation intact, face symmetric, shoulder shrug strong, tongue/uvula midline, no dysarthria.   Motor: hypotonia of both UEs, RT>LT. No abnormal movements.     Right Left   Neck flexion 5 5   Neck extension: 5 5   Shoulder abduction:           1 3-   Elbow extension: 2 3   Elbow flexion:            0 2+   Wrist flexion:            3 4   Wrist extension:            2 3   Finger flexion 3- 4   FDI 1 2   Hip flexion 5 5   Hip extension 5 5   Knee flexion 5 5   Knee extension 5 5   Dorsiflexion 5 5   Planter flexion 5 5      Reflexes: areflexic and symmetric biceps, brachioradialis, triceps. 2+ reflexes in patellae, and achilles bilaterally. Toes down going b/l.  Sensory: intact to light touch in all 4 extremities. Intact pinprick in all ext. Intact sense of position and Vibration.  Coordination: FNF can't be done because of the UEs weakness.   Gait: deferred     Pertinent Studies    MRI brain without contrast:   IMPRESSION:    1. Evidence of chronic small vessel change throughout the deep white  matter of both cerebral hemispheres with no acute intracranial  Findings.     MRI cervical spines:  IMPRESSION:  1. Severe degenerative changes of the cervical spine as detailed  above.  2. Deformation of the spinal cord by posterior disc herniations at the  C2-C3, C3-C4 and C4-C5 levels as " detailed above. Small focus of  presumed myelomalacia in the right anterolateral aspect of the  cervical spinal cord at the C4-C5 level likely due to chronic  impingement.  3. Moderate to severe neural foraminal stenosis on the left at C3-C4,  bilaterally at C4-C5, on the left at C5-C6 and on the left at C6-C7.  -----------------------------------------  Nerve Conduction & EMG 9/18/20   Interpretation:     Abnormal study. There is electrodiagnostic evidence of widespread denervation changes affecting the right cervical, thoracic, lumbosacral and possibly bulbar (genioglossus) region. While cervical radiculomyelopathy technically cannot be fully differentiated from motor neuron disease on electrodiagnostic grounds alone, the findings of 1) widespread denervation in multiple regions, 2) extensive fasciculations, 3) lack of or minimal reinnervation changes at the right upper extremity muscles, and 4) low split hand index ratio (<5.2) favor motor neuron disease. Clinical correlation is required.   The study also showed an incidental right ulnar neuropathy at the elbow.   -------------------------------------------------------------------------  Assessment and Plan:  69 year old male h/o RAKEL on CPAP, HTN, hypokalemia who presents with bilateral UE weakness since 2018. The right is weaker than the left. No LE weakness. Has reduced sensation in both UEs and LEs. Areflexia in both UEs and preserved reflexes in both LEs. MRI brain with diffuse brain atrophy. MRI cervical spines with severe degenerative changes, foraminal narrowing at multiple levels and cord compression at C4-5 level with small encephalomalacia at that level. The C4-5 cervical disc and compression of multiple roots can explain the UE weakness but the absence of LE weakness and urinary incontinence is not typical. The weakness is severe with fasciculation and the sensory changes are not clear, this raises the possibility of motor neuron disease. The fact that  he did not seek help for two years and got along with his illness in the setting of inability to eat or take care of himself suggesting cognitive dysfunction and poor judgment. EMG was done 9/18 (see report above) with widespread denervation in multiple regions and Fasciculations, suggestive of motor neuron disorder, most likely ALS.     # Progressive Bilateral UE weakness  # Motor neuron diseaese  # Possible ALS  - EMG with evidence of motor neuron dis, most likely ALS  - PT and OT evaluation  - Neurosurgery evaluation for cervical spine degenerative changes, no intervention indicated  - Neuropsychology service recommended outpatient follow up for a neuropsychological evaluation  - Needs to follow outpatient with ALS clininc     # Hypertension  Pt had hx of hypokalemia, will switch Lisinopril-hydrochlorothiazide to Lisinopril  - Discontinue PTA Lisinopril-hydrochlorothiazide 20-12.5 mg daily  - increase Lisinopril to 40 mg daily        #DM type 2  Hgb A1c 6.8, not on medication at home  - Medium intensity sliding scale      # Chronic hypokalemia   - Electrolyte replacement protocol  - Discontinue Hydrochlorothiazide     # RAKEL   - CPAP      Diet: Regular diet  DVT ppx: Lovenox  CODE STATUS: Full  Disposition: Awaiting placement, TCU with potential for LTC.     Patient was seen and discussed with attending doctor, Dr. Kelsey.    Mayra Calvillo, DO  Neurology PGY-3   [phone]

## 2020-09-20 NOTE — PLAN OF CARE
Status: Pt admitted for evaluation of bilateral UE weakness since 2018. Pt witht h/o RAKEL on CPAP, HTN, hypokalemia  Vitals: VSS ex HTN within parameters. 2L oxymask when napping/sleeping. On CCM.   Neuros: AxOx4. [place] bilaterally. RUE 2/5, LUE 3/5, BLE 5/5. Decreased sensation in BLE at baseline.   IV: PIV SL.  Resp/trach: LS clear. CPAP on overnight  Diet: Regular diet. Needs assistance w/ ordering and feeding.   Bowel status: BS+. One large loose BM this shift.   : Voiding w/ some post void rentention. Encourage bedside urinal use.   Skin: RUE armpit with blanchable redness. Bilateral groin and scrotum with blanchable erythema.  Pain: pt complaining of dull R abdominal pain.   Activity: up A2 and GB. Pt can be unsteady at times.  Social: pt family updated on the phone. States they will be in town on Monday to visit.    Plan: Awaiting TCU placement w/ outpatient ALS clinic follow-up.

## 2020-09-21 ENCOUNTER — APPOINTMENT (OUTPATIENT)
Dept: OCCUPATIONAL THERAPY | Facility: CLINIC | Age: 70
DRG: 057 | End: 2020-09-21
Payer: COMMERCIAL

## 2020-09-21 ENCOUNTER — APPOINTMENT (OUTPATIENT)
Dept: PHYSICAL THERAPY | Facility: CLINIC | Age: 70
DRG: 057 | End: 2020-09-21
Payer: COMMERCIAL

## 2020-09-21 LAB
ANION GAP SERPL CALCULATED.3IONS-SCNC: 5 MMOL/L (ref 3–14)
BUN SERPL-MCNC: 12 MG/DL (ref 7–30)
CALCIUM SERPL-MCNC: 8.8 MG/DL (ref 8.5–10.1)
CHLORIDE SERPL-SCNC: 112 MMOL/L (ref 94–109)
CO2 SERPL-SCNC: 25 MMOL/L (ref 20–32)
CREAT SERPL-MCNC: 0.65 MG/DL (ref 0.66–1.25)
GFR SERPL CREATININE-BSD FRML MDRD: >90 ML/MIN/{1.73_M2}
GLUCOSE BLDC GLUCOMTR-MCNC: 110 MG/DL (ref 70–99)
GLUCOSE BLDC GLUCOMTR-MCNC: 127 MG/DL (ref 70–99)
GLUCOSE BLDC GLUCOMTR-MCNC: 94 MG/DL (ref 70–99)
GLUCOSE BLDC GLUCOMTR-MCNC: 97 MG/DL (ref 70–99)
GLUCOSE SERPL-MCNC: 114 MG/DL (ref 70–99)
POTASSIUM SERPL-SCNC: 3.3 MMOL/L (ref 3.4–5.3)
POTASSIUM SERPL-SCNC: 3.6 MMOL/L (ref 3.4–5.3)
SODIUM SERPL-SCNC: 142 MMOL/L (ref 133–144)

## 2020-09-21 PROCEDURE — 94660 CPAP INITIATION&MGMT: CPT

## 2020-09-21 PROCEDURE — 97116 GAIT TRAINING THERAPY: CPT | Mod: GP

## 2020-09-21 PROCEDURE — 84132 ASSAY OF SERUM POTASSIUM: CPT | Performed by: PSYCHIATRY & NEUROLOGY

## 2020-09-21 PROCEDURE — 97535 SELF CARE MNGMENT TRAINING: CPT | Mod: GO

## 2020-09-21 PROCEDURE — 40000141 ZZH STATISTIC PERIPHERAL IV START W/O US GUIDANCE

## 2020-09-21 PROCEDURE — 00000146 ZZHCL STATISTIC GLUCOSE BY METER IP

## 2020-09-21 PROCEDURE — 25000128 H RX IP 250 OP 636: Performed by: STUDENT IN AN ORGANIZED HEALTH CARE EDUCATION/TRAINING PROGRAM

## 2020-09-21 PROCEDURE — 25000132 ZZH RX MED GY IP 250 OP 250 PS 637: Performed by: COUNSELOR

## 2020-09-21 PROCEDURE — 25000132 ZZH RX MED GY IP 250 OP 250 PS 637: Performed by: STUDENT IN AN ORGANIZED HEALTH CARE EDUCATION/TRAINING PROGRAM

## 2020-09-21 PROCEDURE — 12000001 ZZH R&B MED SURG/OB UMMC

## 2020-09-21 PROCEDURE — 40000275 ZZH STATISTIC RCP TIME EA 10 MIN

## 2020-09-21 PROCEDURE — 36415 COLL VENOUS BLD VENIPUNCTURE: CPT | Performed by: STUDENT IN AN ORGANIZED HEALTH CARE EDUCATION/TRAINING PROGRAM

## 2020-09-21 PROCEDURE — 97750 PHYSICAL PERFORMANCE TEST: CPT | Mod: GP

## 2020-09-21 PROCEDURE — 80048 BASIC METABOLIC PNL TOTAL CA: CPT | Performed by: STUDENT IN AN ORGANIZED HEALTH CARE EDUCATION/TRAINING PROGRAM

## 2020-09-21 PROCEDURE — 36415 COLL VENOUS BLD VENIPUNCTURE: CPT | Performed by: PSYCHIATRY & NEUROLOGY

## 2020-09-21 RX ADMIN — LISINOPRIL 40 MG: 20 TABLET ORAL at 10:22

## 2020-09-21 RX ADMIN — POTASSIUM CHLORIDE 40 MEQ: 750 TABLET, EXTENDED RELEASE ORAL at 10:22

## 2020-09-21 RX ADMIN — POTASSIUM CHLORIDE 20 MEQ: 750 TABLET, EXTENDED RELEASE ORAL at 12:56

## 2020-09-21 RX ADMIN — ENOXAPARIN SODIUM 40 MG: 40 INJECTION SUBCUTANEOUS at 10:21

## 2020-09-21 ASSESSMENT — ACTIVITIES OF DAILY LIVING (ADL)
ADLS_ACUITY_SCORE: 18

## 2020-09-21 NOTE — PLAN OF CARE
Discharge Planner PT   Patient plan for discharge: TCU  Current status:  VSS on RA.  Requiring CGA-min A from supine to sit 2/2 BUE weakness but overall moving well in bed.  STS with CGAx1. Ambulates ~600ft with CGAx1 -no AD.  Demo short step length but adequate speed. No UE swing present 2/2 BUE weakness.  Ascends and descends 3 steps with single railing and CGAx1 step to pattern.    Barriers to return to prior living situation: impaired balance and gait, impulsivity, current level of assist required, lives alone, falls risk (see DGI data from 9/21)  Recommendations for discharge: TCU; potential for LTC  Rationale for recommendations: Patient is functioning below previous baseline and would benefit from continued therapy services. BUE weakness is significantly impacting mobility and self-care within the patients home, patient lives alone and does not have 24hr assist available.       Entered by: Joey Becker 09/21/2020 4:20 PM

## 2020-09-21 NOTE — PLAN OF CARE
Status: Pt admitted for evaluation of bilateral UE weakness since 2018. Hx RAKEL on CPAP, HTN, Hypokalemia  Vitals: VSS ex HTN within parameters. 2L oxymask when sleeping (CPAP). CCM  Neuros: AxOx4. [place] bilaterally. RUE 2/5, LUE 3/5, BLE 5/5. Decreased sensation in BLE at baseline.   IV: PIV SL  Resp/trach: LS clear.   Diet: Regular diet with assistance ordering and feeding  Bowel status: BS+, No BM on shift  : Voiding via urinal. Some post void rentention. Needs assistance using the urinal.  Skin: RUE armpit with blanchable redness. Bilateral groin and scrotum with blanchable erythema  Pain: Intermittent R abdominal pain  Activity: Up with assist of 1 and GB.  Plan: PM&R consult ordered. Sister and brother here from [place] to help with decision making because pt will most likely not return to his apartment.

## 2020-09-21 NOTE — PLAN OF CARE
Status: Pt admitted for evaluation of bilateral UE weakness since 2018. Hx RAKEL on CPAP, HTN, Hypokalemia  Vitals: VSS ex HTN within parameters. 2L oxymask when sleeping (CPAP). CCM  Neuros: AxOx4. [place] bilaterally. RUE 2/5, LUE 3/5, BLE 5/5. Decreased sensation in BLE at baseline. Denies HA, N/V, N/T  IV: PIV SL  Resp/trach: LS clear. CPAP overnight  Diet: Regular diet with assistance ordering and feeding  Bowel status: BS+, No BM on shift  : Voiding via urinal. Some post void rentention. Encourage bedside urinal use.   Skin: RUE armpit with blanchable redness. Bilateral groin and scrotum with blanchable erythema  Pain: Intermittent R abdominal pain  Activity: Up with assist of 1-2 and GB. Pt can be unsteady at times   Plan: Awaiting TCU placement w/ outpatient ALS clinic follow-up

## 2020-09-21 NOTE — PROGRESS NOTES
Dynamic Gait Index (DGI):The DGI is a measure of balance during gait that is reliable and valid for the elderly and individuals with Parkinson's disease, MS, vestibular disorders, or s/p stroke. Gait assistive device used: None    Patient score: 18/24  Scores ?19/24 indicate an increased risk for falls according to Rylie et al 2000  Minimal Detectable Change = 2.9 in community dwelling elderly according to Bladimir et al 2011    Assessment (rationale for performing, application to patient s function & care plan): Current medical status, general deconditioning, BUE weakness, current level of assist required, lives alone  Recommendations for discharge: TCU; potential for LTC  Rationale for recommendations: DGI administered in order to assess falls risk and aid in discharge planning. Score of 18/24 indicates at risk for falls.   Minutes billed as physical performance test: 8

## 2020-09-21 NOTE — PLAN OF CARE
Status: Pt admitted for evaluation of bilateral UE weakness since 2018  Vitals: VSS ex HTN within parameters. 2L oxymask when sleeping. CCM  Neuros: AxOx4. [place] bilaterally. RUE 2/5, LUE 3/5, BLE 5/5. Decreased sensation in BLE at baseline  IV: PIV SL  Resp/trach: LS clear. CPAP overnight  Diet: Regular diet with assistance ordering and feeding  Bowel status: One large loose BM this shift  : Voiding  Skin: RUE armpit with blanchable redness. Bilateral groin and scrotum with blanchable erythema  Pain: Intermittent R abdominal pain  Activity: Up with assist of 1-2 and GB   Plan: Awaiting TCU placement w/ outpatient ALS clinic follow-up

## 2020-09-21 NOTE — PROGRESS NOTES
"[facility]  General Neurology Progress Note    Patient Name:  John C Dunphy  MRN:  [id number]    :  1950  Date of Service: 2020      Subjective:   \"I'm fine.\" No fevers/chills/chest pain/increasing weakness/nausea/vomiting. Endorsing poor sleep overnight.    Sister present at bedside for attending rounds. She endorses subtle speech changes that she has noticed.      Physical Examination   Vitals: BP (!) 141/82 (BP Location: Left arm)   Pulse 59   Temp 98  F (36.7  C) (Oral)   Resp 16   Ht 1.676 m (5' 6\")   Wt 98.4 kg (216 lb 14.4 oz)   SpO2 99%   BMI 35.01 kg/m    Physical Exam:  Constitutional: Alert. Lying in bed comfortably. No acute distress.   Head: Atraumatic, normocephalic.  ENT: Moist mucous membranes. No sinus drainage.  Cardiovascular: Appears warm, well-perfused, and non-toxic.  Respiratory: No increased work of breathing, no accessory muscle use.   Gastrointestinal: Does not appear distended.  Neurologic:   Mental Status/language: alert, oriented to p/p/t. Speech is fluent, able to comprehend, and follows commands. No dysarthria.  Cranial Nerves: EOMI without nystagmus, eyes move conjugately. Lashes are buried on eye squeeze. Nasolabial folds symmetric.Tongue midline. hearing intact to conversation.    Motor exam: hypotonia of both UEs, R>L    Manual muscle testing revealed the following MRC grade muscle power:   Right Left   Shoulder abduction:  3 3   Elbow Flexion: 2 4   Elbow Extension:  2 4   Wrist Extension:  3 4   Finger Extension:  3 3   FDI 2 4   Finger Flexion 2 4   Wrist Flexion 3 4   Hip Flexion 4 4   Knee Extension 5 5   Knee Flexion 5 5   Dorsiflexion 4 4   Plantar flexion 5 5      Reflexes: areflexic and symmetric biceps, brachioradialis, triceps, 1+ in patellae, and achilles bilaterally.   Sensory: intact to light touch in all 4 extremities.   Coordination: FNF unable to be performed 2/2 UEs weakness.   Gait: deferred due to " weakness and falls risk      Investigations    MRI brain without contrast:   IMPRESSION:    1. Evidence of chronic small vessel change throughout the deep white  matter of both cerebral hemispheres with no acute intracranial  Findings.     MRI cervical spines:  IMPRESSION:  1. Severe degenerative changes of the cervical spine as detailed  above.  2. Deformation of the spinal cord by posterior disc herniations at the  C2-C3, C3-C4 and C4-C5 levels as detailed above. Small focus of  presumed myelomalacia in the right anterolateral aspect of the  cervical spinal cord at the C4-C5 level likely due to chronic  impingement.  3. Moderate to severe neural foraminal stenosis on the left at C3-C4,  bilaterally at C4-C5, on the left at C5-C6 and on the left at C6-C7.  -----------------------------------------  Nerve Conduction & EMG 9/18/20   Interpretation:     Abnormal study. There is electrodiagnostic evidence of widespread denervation changes affecting the right cervical, thoracic, lumbosacral and possibly bulbar (genioglossus) region. While cervical radiculomyelopathy technically cannot be fully differentiated from motor neuron disease on electrodiagnostic grounds alone, the findings of 1) widespread denervation in multiple regions, 2) extensive fasciculations, 3) lack of or minimal reinnervation changes at the right upper extremity muscles, and 4) low split hand index ratio (<5.2) favor motor neuron disease. Clinical correlation is required.   The study also showed an incidental right ulnar neuropathy at the elbow.   -------------------------------------------------------------------------     Recent Labs   Lab 09/21/20  0615 09/20/20  0747 09/19/20  0808  09/18/20  0618 09/17/20  1401 09/16/20  1745   WBC  --   --   --   --  7.5  --  10.4   HGB  --   --   --   --  15.4  --  17.1   MCV  --   --   --   --  89  --  87   PLT  --  181  --   --  173  --  207   INR  --   --   --   --   --  1.09 1.10    140 140  --  139   --  141   POTASSIUM 3.3* 3.4 3.4   < > 2.9*  --  3.1*   CHLORIDE 112* 112* 110*  --  107  --  106   CO2 25 24 24  --  26  --  28   BUN 12 13 10  --  10  --  9   CR 0.65* 0.58* 0.64*  --  0.67  --  0.64*   ANIONGAP 5 4 6  --  6  --  7   MELLY 8.8 8.4* 8.3*  --  8.1*  --  8.8   * 110* 111*  --  116*  --  110*   ALBUMIN  --   --   --   --  2.7*  --  3.4   PROTTOTAL  --   --   --   --  6.3*  --  7.5   BILITOTAL  --   --   --   --  0.8  --  0.6   ALKPHOS  --   --   --   --  78  --  83   ALT  --   --   --   --  26  --  38   AST  --   --   --   --  21  --  35   TROPI  --   --   --   --   --   --  0.045    < > = values in this interval not displayed.         Assessment and Plan/Recs:  Mr. Dunphy is a 69 year old man h/o RAKEL on CPAP, HTN, hypokalemia who presented with bilateral UE weakness since 2018. The right is weaker than the left. No LE weakness. Has reduced sensation in both UEs and LEs. Areflexia in both UEs and preserved reflexes in both LEs. MRI brain with diffuse brain atrophy. MRI cervical spines with severe degenerative changes, foraminal narrowing at multiple levels and cord compression at C4-5 level with small myelomalacia at that level. The C4-5 cervical disc and compression of multiple roots can explain the UE weakness but the absence of LE weakness and urinary incontinence is not typical. The weakness is severe with fasciculation and the sensory changes are not clear, this raises the possibility of motor neuron disease. The fact that he did not seek help for two years and got along with his illness in the setting of inability to eat or take care of himself suggesting cognitive dysfunction and poor judgment. EMG was done 9/18(report copied above) with widespread denervation in multiple regions and Fasciculations, suggestive of motor neuron disorder, most likely ALS.     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  - Neurosurgery signed off, no intervention indicated     # Hypertension  Pt had hx of hypokalemia, switch Lisinopril-hydrochlorothiazide to Lisinopril  - Discontinued PTA Lisinopril-hydrochlorothiazide 20-12.5 mg daily  - Started Lisinopril 40 mg daily     #DM type 2  Hgb A1c 6.8, not on medication at home  - Medium intensity sliding scale  -PCP follow up     # Chronic hypokalemia   - Electrolyte replacement protocol  - Discontinued Hydrochlorothiazide     # RAKEL   - CPAP      Diet: Regular diet  DVT ppx: Lovenox  CODE STATUS: Full  Disposition: Awaiting placement, he has a transfer agreement with OSH that initially transferred him here. Prior to going home therapies currently recommending TCU.       Patient was seen and discussed with attending neurologist Dr. Chinchilla.    Yaneth Melendez MD  PGY2 Neurology  [phone]    Attending physician: I saw and evaluated the patient with the resident team and I agree with the findings and plan of care as per Dr. Melendez above, with the following additions.    The patient is a 69 year old man with a two year history of right and then left arm painless progressive weakness, with subsequent decline in ambulation as well (although he attributes much of the latter to deconditioning). There are widespread fasciculations in multiple myotomes and EMG reveals evidence of active denervation in cervical, lumbar and bulbar segments. There are no compelling upper motor neuron signs at present, although there may be a slight spastic dysarthria evident.    Diagnosis of a progressive motor neuron disease appears secure based on the above history and paraclinical findings. I answered multiple questions from the patient and his sister regarding the diagnosis and  prognosis. I discussed with the patient that median survival from onset of symptoms of motor neuron disease is approximately 3 years, although minimal bulbar involvement and absence of any thoracic involvement are relatively favorable prognostic signs. I indicated to him that re-examination at 3 to 6 months will be helpful in assessing the rate of progression in his case.    Disposition: The patient is going to require TCU placement, and is unlikely to be able to return to a fully independent level of function, and this was also discussed with him in detail.    Follow up after discharge will be through the ALS clinic at the [facility].    Dane Resendez MD   of Neurology

## 2020-09-22 ENCOUNTER — APPOINTMENT (OUTPATIENT)
Dept: PHYSICAL THERAPY | Facility: CLINIC | Age: 70
DRG: 057 | End: 2020-09-22
Payer: COMMERCIAL

## 2020-09-22 ENCOUNTER — APPOINTMENT (OUTPATIENT)
Dept: OCCUPATIONAL THERAPY | Facility: CLINIC | Age: 70
DRG: 057 | End: 2020-09-22
Payer: COMMERCIAL

## 2020-09-22 LAB
ANION GAP SERPL CALCULATED.3IONS-SCNC: 6 MMOL/L (ref 3–14)
BUN SERPL-MCNC: 11 MG/DL (ref 7–30)
CALCIUM SERPL-MCNC: 8.7 MG/DL (ref 8.5–10.1)
CHLORIDE SERPL-SCNC: 113 MMOL/L (ref 94–109)
CO2 SERPL-SCNC: 22 MMOL/L (ref 20–32)
CREAT SERPL-MCNC: 0.55 MG/DL (ref 0.66–1.25)
GFR SERPL CREATININE-BSD FRML MDRD: >90 ML/MIN/{1.73_M2}
GLUCOSE BLDC GLUCOMTR-MCNC: 101 MG/DL (ref 70–99)
GLUCOSE BLDC GLUCOMTR-MCNC: 105 MG/DL (ref 70–99)
GLUCOSE BLDC GLUCOMTR-MCNC: 108 MG/DL (ref 70–99)
GLUCOSE BLDC GLUCOMTR-MCNC: 154 MG/DL (ref 70–99)
GLUCOSE BLDC GLUCOMTR-MCNC: 85 MG/DL (ref 70–99)
GLUCOSE SERPL-MCNC: 107 MG/DL (ref 70–99)
POTASSIUM SERPL-SCNC: 3.6 MMOL/L (ref 3.4–5.3)
SODIUM SERPL-SCNC: 141 MMOL/L (ref 133–144)

## 2020-09-22 PROCEDURE — 25000132 ZZH RX MED GY IP 250 OP 250 PS 637: Performed by: COUNSELOR

## 2020-09-22 PROCEDURE — 00000146 ZZHCL STATISTIC GLUCOSE BY METER IP

## 2020-09-22 PROCEDURE — 94660 CPAP INITIATION&MGMT: CPT

## 2020-09-22 PROCEDURE — 80048 BASIC METABOLIC PNL TOTAL CA: CPT | Performed by: PHYSICAL MEDICINE & REHABILITATION

## 2020-09-22 PROCEDURE — 97535 SELF CARE MNGMENT TRAINING: CPT | Mod: GO

## 2020-09-22 PROCEDURE — 25000128 H RX IP 250 OP 636: Performed by: STUDENT IN AN ORGANIZED HEALTH CARE EDUCATION/TRAINING PROGRAM

## 2020-09-22 PROCEDURE — 40000275 ZZH STATISTIC RCP TIME EA 10 MIN

## 2020-09-22 PROCEDURE — 12000001 ZZH R&B MED SURG/OB UMMC

## 2020-09-22 PROCEDURE — 36415 COLL VENOUS BLD VENIPUNCTURE: CPT | Performed by: PHYSICAL MEDICINE & REHABILITATION

## 2020-09-22 PROCEDURE — 97530 THERAPEUTIC ACTIVITIES: CPT | Mod: GP

## 2020-09-22 PROCEDURE — 97116 GAIT TRAINING THERAPY: CPT | Mod: GP

## 2020-09-22 RX ADMIN — ENOXAPARIN SODIUM 40 MG: 40 INJECTION SUBCUTANEOUS at 10:00

## 2020-09-22 RX ADMIN — LISINOPRIL 40 MG: 20 TABLET ORAL at 09:58

## 2020-09-22 ASSESSMENT — ACTIVITIES OF DAILY LIVING (ADL)
ADLS_ACUITY_SCORE: 18

## 2020-09-22 NOTE — PROGRESS NOTES
"[facility]  General Neurology Progress Note    Patient Name:  John C Dunphy  MRN:  [id number]    :  1950  Date of Service: 2020      Subjective:   \"I'm ok.\" No new endorsed symptoms. Endorsing improved sleep overnight.      Physical Examination   Vitals: BP (!) 166/87   Pulse 58   Temp 97.4  F (36.3  C) (Axillary)   Resp 16   Ht 1.676 m (5' 6\")   Wt 98.4 kg (216 lb 14.4 oz)   SpO2 99%   BMI 35.01 kg/m    Physical Exam:  Constitutional: Alert. Lying in bed comfortably. No acute distress.   Head: Atraumatic, normocephalic.  ENT: Moist mucous membranes. No sinus drainage.  Cardiovascular: Appears warm, well-perfused, and non-toxic.  Respiratory: No increased work of breathing, no accessory muscle use.   Gastrointestinal: Does not appear distended.  Neurologic:   Mental Status/language: alert, oriented to p/p/t. Speech is fluent, able to comprehend, and follows commands. No notable dysarthria (perhaps subtle changes per sister yesterday, however not grossly appreciable to stranger).  Cranial Nerves: EOMI without nystagmus, eyes move conjugately. Nasolabial folds symmetric. hearing intact to conversation.    Motor exam: hypotonia of both UEs, R>L     Manual muscle testing revealed the following MRC grade muscle power:   Right Left   Shoulder abduction:  3 3   Elbow Flexion: 2 4   Elbow Extension:  2 4   Wrist Extension:  3 4   FDI 2 4   Hip Flexion 5- 5-   Knee Extension 5 5   Knee Flexion 5 5   Dorsiflexion 4 4   Plantar flexion 5 5      Reflexes: areflexic and symmetric biceps, brachioradialis, triceps, 1+ in patellae, and achilles bilaterally.   Sensory: intact to light touch in all 4 extremities.   Coordination: FNF unable to be performed 2/2 UEs weakness.   Gait: deferred due to weakness and falls risk, PT to see today       Investigations    MRI brain without contrast:   IMPRESSION:    1. Evidence of chronic small vessel change throughout the deep " white  matter of both cerebral hemispheres with no acute intracranial  Findings.     MRI cervical spines:  IMPRESSION:  1. Severe degenerative changes of the cervical spine as detailed  above.  2. Deformation of the spinal cord by posterior disc herniations at the  C2-C3, C3-C4 and C4-C5 levels as detailed above. Small focus of  presumed myelomalacia in the right anterolateral aspect of the  cervical spinal cord at the C4-C5 level likely due to chronic  impingement.  3. Moderate to severe neural foraminal stenosis on the left at C3-C4,  bilaterally at C4-C5, on the left at C5-C6 and on the left at C6-C7.  -----------------------------------------  Nerve Conduction & EMG 9/18/20   Interpretation:     Abnormal study. There is electrodiagnostic evidence of widespread denervation changes affecting the right cervical, thoracic, lumbosacral and possibly bulbar (genioglossus) region. While cervical radiculomyelopathy technically cannot be fully differentiated from motor neuron disease on electrodiagnostic grounds alone, the findings of 1) widespread denervation in multiple regions, 2) extensive fasciculations, 3) lack of or minimal reinnervation changes at the right upper extremity muscles, and 4) low split hand index ratio (<5.2) favor motor neuron disease. Clinical correlation is required.   The study also showed an incidental right ulnar neuropathy at the elbow.   -------------------------------------------------------------------------     Recent Labs   Lab 09/22/20  0559 09/21/20  1626 09/21/20  0615 09/20/20  0747  09/18/20  0618 09/17/20  1401 09/16/20  1745   WBC  --   --   --   --   --  7.5  --  10.4   HGB  --   --   --   --   --  15.4  --  17.1   MCV  --   --   --   --   --  89  --  87   PLT  --   --   --  181  --  173  --  207   INR  --   --   --   --   --   --  1.09 1.10     --  142 140   < > 139  --  141   POTASSIUM 3.6 3.6 3.3* 3.4   < > 2.9*  --  3.1*   CHLORIDE 113*  --  112* 112*   < > 107  --  106    CO2 22  --  25 24   < > 26  --  28   BUN 11  --  12 13   < > 10  --  9   CR 0.55*  --  0.65* 0.58*   < > 0.67  --  0.64*   ANIONGAP 6  --  5 4   < > 6  --  7   MELLY 8.7  --  8.8 8.4*   < > 8.1*  --  8.8   *  --  114* 110*   < > 116*  --  110*   ALBUMIN  --   --   --   --   --  2.7*  --  3.4   PROTTOTAL  --   --   --   --   --  6.3*  --  7.5   BILITOTAL  --   --   --   --   --  0.8  --  0.6   ALKPHOS  --   --   --   --   --  78  --  83   ALT  --   --   --   --   --  26  --  38   AST  --   --   --   --   --  21  --  35   TROPI  --   --   --   --   --   --   --  0.045    < > = values in this interval not displayed.         Assessment and Plan/Recs:  Mr. Dunphy is a 69 year old man h/o RAKEL on CPAP, HTN, hypokalemia who presented with bilateral UE weakness since 2018. The right is weaker than the left. No LE weakness. Has reduced sensation in both UEs and LEs. Areflexia in both UEs and preserved reflexes in both LEs. MRI brain with diffuse brain atrophy. MRI cervical spines with severe degenerative changes, foraminal narrowing at multiple levels and cord compression at C4-5 level with small myelomalacia at that level. The C4-5 cervical disc and compression of multiple roots can explain the UE weakness but the absence of LE weakness and urinary incontinence is not typical. The weakness is severe with fasciculation and the sensory changes are not clear, this raises the possibility of motor neuron disease. The fact that he did not seek help for two years and got along with his illness in the setting of inability to eat or take care of himself suggesting cognitive dysfunction and poor judgment. EMG was done 9/18(report copied above) with widespread denervation in multiple regions and Fasciculations, suggestive of motor neuron disorder, most likely ALS.      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  - Neurosurgery signed off, no intervention indicated     # Hypertension  Pt had hx of hypokalemia, switch Lisinopril-hydrochlorothiazide to Lisinopril  - Discontinued PTA Lisinopril-hydrochlorothiazide 20-12.5 mg daily  - Lisinopril 40 mg daily     #DM type 2  Hgb A1c 6.8, not on medication at home  - Medium intensity sliding scale  - PCP follow up once discharged    # Chronic hypokalemia   - Electrolyte replacement protocol  - Discontinued Hydrochlorothiazide     # RAKEL   - CPAP      Diet: Regular diet  DVT ppx: Lovenox  CODE STATUS: Full  Disposition: Awaiting placement, he has a transfer agreement with OSH that initially transferred him here. Prior to going home therapies currently recommending TCU. (PMR to assess)       Patient was seen and discussed with attending neurologist Dr. Chinchilla.    Yaneth Melendez MD  PGY2 Neurology  [phone]    Attending physician: I saw and evaluated the patient with the resident team and I agree with the findings and plan of care as per Dr. Melendez above, with the following additions.    Patient was seen by PM&R team today, appreciate their recommendations in support of TCU placement.    I answered a number of questions regarding prognosis for the patient today; elaborated that ~25% of patients with motor neuron disease survive for at least 5 years following symptom onset (in his case, this was in March-May 2018). Not possible to say if he will fall into this category, but I think it more likely than not that his life expectancy will exceed six months at present given lack of bulbar involvement and normal respiratory function.    Disposition: Patient is  medically ready for discharge, awaiting TCU placement.    Dane Resendez MD   of Neurology

## 2020-09-22 NOTE — PLAN OF CARE
Admitted for eval of BUE weakness since 2018.  Possible ALS diagnosis.  Afebrile, bradycardic in the 50's,  OVSS on C-pap when sleeping.  Alert and oriented x4.  RUE 2/5 BLE 5/5.  Decreased sensation in lower extremities.  Voiding spontaneously in urinal, needs help.  No BM this shift.  Bilateral groin and scrotum with blanchable redness.  Tolerating regular diet, pt. Needs help ordering meals and eating.  Up with A1 and GB.  Possible transfer to TCU tomorrow.  Continue plan of care.

## 2020-09-22 NOTE — PLAN OF CARE
Status: Admitted for eval of bilateral UE weakness since 2018. Possible ALS diagnosis.   Vitals: VSS on CPAP during sleep. Bradycardic in 50s.   Neuros: A&Ox4. RUE 2/5 LUE 3/5 BLE 5/5. Decreased sensations in lower extremities.    IV: PIV SL  Resp/trach: LS clear.   Diet: Regular. Needs assist with ordering and feeding .  Bowel status: BS+, no BM this shift   : Voids using urinal with help. Occasional post void residual. BS at 0600 for 180.   Skin: RUE armpit blanchable redness. Bilateral groin and scrotum with blanchable redness  Pain: Denies  Activity: 1A GB   Plan: Likely to discharge to TCU once placement confirmed.

## 2020-09-22 NOTE — PLAN OF CARE
"Discharge Planner OT   Patient plan for discharge: pt unable to state   Current status: Educated on alternative adaptive strategies for eating and bathing UB using RUE movements in antigravity and a surface to place RUE. Pt able to \"walk\" hand across tray table to reach small items ~2 feet direction in front, by using fingers to push forearm off table and move arm outwards. Facilitated UB bathing with same principles, pt required modA for washing RUE, maxA for face with compensatory strategies, and total assist for reaching back, LUE, and remainder of UB. Discussed discharge plans as patient initially stating he wanted to return home, pt verbalizing understanding of needing support for ADL/IADLs.  Barriers to return to prior living situation: medical status, UE weakness, level of assist  Recommendations for discharge: TCU, potential to progress to LTC  Rationale for recommendations: Pt is functioning below PLOF and would benefit from continued skilled occupational therapy services to maximize level of IND and safety with ADLs. Pt basic ADLs are significantly impacted, pt lives alone and does not have 24 hour assistance in place.        Entered by: Zoya Cabrera 09/22/2020 1:21 PM      "

## 2020-09-22 NOTE — PROGRESS NOTES
Social Work Services Progress Note    Hospital Day: 7  Date of Initial Social Work Evaluation:  9/18/2020  Collaborated with:  Patient, patient's sister (Marleny ([phone]),  PMR and SNF Admissions Coordinator's    Data:  SW is following pt for discharge planning. A   TCU stay is recommended and there is a strong possibility that pt will need to remain in the receiving SNF for LTC.  [facility]      Intervention:  SW phoned Admissions at [facility] and [facility] and left messages for representatives to call in regards to acceptance.  PMR consulted (Dr. Gramajo).  SW spoke with Dr. Gramajo who states that TCU is recommended.  SW met with pt and pt's sister (Marleny).  Transport to and from follow up appts, masking (for Covid), visitor restrictions and cpap were discussed (pt has his own cpap) and well as rehab placement.  Per discussion, pt states that he will likely not be able to return to home.  Thus placement in a SNF that could keep him for LTC (if needed) is preferred.  SW requested additional preferences and pt and sister identified the following:  [facility], [facility], [facility], [facility], [facility] and [facility].    Assessment:  Pt and sister voice understanding of the discharge plan and agreement with the discharge plan.    Plan:    Anticipated Disposition:  SNF placement for rehab and potential LTC    Barriers to d/c plan:  Medical readiness, secure accepting SNF    Follow Up:  SW will follow for discharge planning.    SHERIDAN Castro  Social Work, 6A  Phone:  [phone]  Pager:  [phone]  9/23/2020

## 2020-09-22 NOTE — PLAN OF CARE
Status: Pt admitted for evaluation of bilateral UE weakness since 2018  Vitals: VSS ex HTN within parameters  Neuros: AxOx4. [place] bilaterally. RUE 2/5, LUE 3/5, BLE 5/5. Decreased sensation in BLE at baseline  IV: PIV SL  Resp/trach: LS clear. CPAP overnight  Diet: Regular diet with assistance ordering and feeding  Bowel status: BS+  : Voiding spontaneously via urinal  Skin: RUE armpit with blanchable redness. Bilateral groin and scrotum with blanchable erythema  Pain: Intermittent R abdominal pain  Activity: Up with assist of 1 and GB   Plan: Continue to monitor and follow POC

## 2020-09-22 NOTE — PLAN OF CARE
Discharge Planner PT   Patient plan for discharge: rehab  Current status: Ambulates ~400ft and 200ft with CGAx1 -no AD.  Demo short step length but adequate speed. No UE swing present 2/2 BUE weakness.  Minor vc to navigate halls but only 2/2 not having glasses today.     Barriers to return to prior living situation: impaired balance and gait, impulsivity, current level of assist required, lives alone, falls risk (see DGI data from 9/21)  Recommendations for discharge: TCU; potential for LTC  Rationale for recommendations: Patient is functioning below previous baseline and would benefit from continued therapy services. BUE weakness is significantly impacting mobility and self-care within the patients home, patient lives alone and does not have 24hr assist available.       Entered by: Jeoy Becker 09/22/2020 10:47 AM

## 2020-09-22 NOTE — CONSULTS
[facility]   PM&R Inpatient Consultation  _______________________________________________________  Patient: John C Dunphy   : 1950   MRN: [id number]   _______________________________________________________    DATE OF ENCOUNTER:  2020   REFERRING PROVIDER:  Dr. Ravindra Alexander    REASON FOR CONSULT:  Evaluate rehabilitation needs and appropriateness for acute rehabilitation or TCU.    ASSESSMENT:    69 year old man with chronic progressive upper extremity weakness with electrodiagnostic and clinical findings suggestive of ALS without bulbar involvement.  He is unsafe at his baseline level of function and supervision.  He requires training and education with occupational and physical therapy and close outpatient follow up.  He is unlikely to qualify for acute rehabilitation due to his expected prognosis and low acute medical management needs, though he would benefit from discharge planning and continued training in a rehabilitation setting.    Potential inpatient rehabilitation diagnoses include: ALS    RECOMMENDATIONS:    # Rehabilitation Recommendations:   1) He would benefit from TCU placement for rehabilitation and training regarding his rehab diagnosis.  He will need additional outpatient level rehab following inpatient rehab course.  2) Expected rehab needs - He will require assistance with activities of daily living and may have evolving rehabilitation needs on functional decline.  The expected level of family involvement is yet to be determined and would benefit from caregiver training or education sessions  6) Anticipated d/c destination - TCU, then to a long term care facility or home with 24 hour supervision  7) Decision Maker - Self    Discussed these items with the patient:  - Regarding family involvement, the patient will ask his family about the possibility of long term home living with them after he finishes rehab  - If they have doubts, one of them can  visit for a day of therapy sessions for caregiver training  - The timing of this training will be around discharge from rehab but can be initiated earlier.  He was instructed to discuss this with his therapists.    We will continue to follow along with you to provide further recommendations.    HISTORY OF PRESENT ILLNESS:    John C Dunphy is a 69 year old male with a PMH of obstructive sleep apnea on CPAP hypertension with progressive bilateral upper extremity weakness over the past 2 years.  He was worked up by neurology with an EMG and found to have widespread denervation and fasciculations in multiple regions, suggestive of a motor neuron disorder like ALS.    Prior to hospitalization, he was living independently but was unable to perform ADLs like putting on his clothes.  He ate using his mouth only.  He is afraid to use stairs as he has fallen down multiple times.  He denies neck pain or sensory changes to his arms.  He cannot write anymore.    During admission, his upper extremity weakness has slightly worsened, right more than left.  He does not think he has increased weakness in his lower extremities.  He is able to hold cups steady while eating with his left hand, which he endorses has been his 1 year baseline.  He feels like his walking endurance is at his household level baseline as well.  He had many conversations over the past few days and denies slurring or hoarseness of speech or shortness of breath.    He wants to be functionally independent when he discharges, but he realizes that he will require long term assistance with his progressive prognosis.  He has not explored supervised home care with his family and is unsure of how to broach the topic, and his family hasn't yet offered.    He has mild sharp stomach pain in the right lower quadrant, nontender, worsened with sudden hip movement. He denies nausea, diarrhea, joint or groin pain.  The pain does not radiate.  He denies fevers or chills.    #  Current Function:   - PT:  From note: Ambulates ~400ft and 200ft with CGAx1 -no AD.  Demo short step length but adequate speed. No UE swing present 2/2 BUE weakness.  Minor vc to navigate halls but only 2/2 not having glasses today.  - OT:  From note: Educated in compensatory strategies for eating, utilizing RUE in antigravity plane to facilitate movement. Demonstrated ability to doff LB clothing w/ RUE and moving hips. Unable to don briefs IND.  Previously scored 18/22 on the MoCA  - SLP:  From 9/17 note:  Pt presents with a functional oropharyngeal swallowing mechanism in the setting of weakness.  Recommend continuaiton of regular diet, thin liquids, with supervision and feeding assist.  Ensure pt is fully alert and upright for all PO, given small bites/sips, alternating bites/sips.  Pt awake and alert, requiring feeding assist per weakness.  Oral mech unremarkable.  No overt s/sx of aspiration with thin via cup/straw, semisolid, solid texture.  Mildly prolonged mastication with solid texture but WFL.  Pt following safe swallow precautions with min cues.    # Prior Functional History:  - Activities of daily living:  Independent but substandard, unable to wear clothes in the house or feed self using hands.  - Cognition:    No overt cognitive issues but delayed medical care for years while function was declining  - Mobility:    Ambulates household distances, difficulty navigating stairs with history of prior accidents  - Assistive devices:  None    Social History:  - Living situation:  Lived independently in a house  - Family support:  No local family, they live out of state in [place] / [place].  He remembers that his brother's house in [place] doesn't have a bed on the ground floor.  _________________________________________________  Past Medical History:  Past Medical History:   Diagnosis Date     Hypertension      Family History:  History reviewed. No pertinent family history.  "  Medications:  Current Outpatient Medications   Medication Instructions     lisinopril-hydrochlorothiazide (ZESTORETIC) 20-12.5 MG tablet 1 tablet, Oral      Allergies:  No Known Allergies  Review of Systems:  Negative 10 point review of systems unless noted in the HPI.  _________________________________________________    OBJECTIVE:    Physical Exam:  BP (!) 151/79   Pulse 57   Temp 97  F (36.1  C) (Axillary)   Resp 16   Ht 1.676 m (5' 6\")   Wt 98.4 kg (216 lb 14.4 oz)   SpO2 97%   BMI 35.01 kg/m    Estimated body mass index is 35.01 kg/m  as calculated from the following:    Height as of this encounter: 1.676 m (5' 6\").    Weight as of this encounter: 98.4 kg (216 lb 14.4 oz).  GEN:  Sitting up in chair, no acute distress  HEENT: NCAT  CV:  RRR, no murmurs  PULM:  CTAB  ABD:  Soft, nontender, no masses, negative grigsby's sign  EXT:  Asymmetric atrophy of discrete muscles of forearms.  No fasciculations noted on inspection.  SKIN:  No bruises or rashes  PSYCH: Conversationally appropriate  NEURO/MSK: Alert, oriented.  Face symmetric with conjugate gaze.  Shoulder shrug intact bilaterally.  1/5 shoulder abduction, 2/5 wrist extension, 3/5 finger flexors bilaterally.  2/5 biceps 4/5 triceps, and 3/5 finger abduction on left.  2/5 biceps, 2/5 triceps, and 2/5 finger abduction on right.  4/5 hip flexors, 5/5 in remainder of lower extremity testing.  Globally hyporeflexic.  Symmetric sensation to light touch.  _________________________________________________  Pertinent Labs/Imaging:    MRI cervical spine  IMPRESSION:  1. Severe degenerative changes of the cervical spine as detailed  above.  2. Deformation of the spinal cord by posterior disc herniations at the  C2-C3, C3-C4 and C4-C5 levels as detailed above. Small focus of  presumed myelomalacia in the right anterolateral aspect of the  cervical spinal cord at the C4-C5 level likely due to chronic  impingement.  3. Moderate to severe neural foraminal stenosis " on the left at C3-C4,  bilaterally at C4-C5, on the left at C5-C6 and on the left at C6-C7.    Nerve Conduction & EMG 9/18/20  Interpretation:     Abnormal study. There is electrodiagnostic evidence of widespread denervation changes affecting the right cervical, thoracic, lumbosacral and possibly bulbar (genioglossus) region. While cervical radiculomyelopathy technically cannot be fully differentiated from motor neuron disease on electrodiagnostic grounds alone, the findings of 1) widespread denervation in multiple regions, 2) extensive fasciculations, 3) lack of or minimal reinnervation changes at the right upper extremity muscles, and 4) low split hand index ratio (<5.2) favor motor neuron disease. Clinical correlation is required. The study also showed an incidental right ulnar neuropathy at the elbow.     Patient was staffed with Dr. Price  --------------------------------------------------------------------------------------  Lucien Gill MD, 9/22/2020  PM&R Resident, PGY 3  --------------------------------------------------------------------------------------

## 2020-09-23 ENCOUNTER — APPOINTMENT (OUTPATIENT)
Dept: PHYSICAL THERAPY | Facility: CLINIC | Age: 70
DRG: 057 | End: 2020-09-23
Payer: COMMERCIAL

## 2020-09-23 ENCOUNTER — APPOINTMENT (OUTPATIENT)
Dept: OCCUPATIONAL THERAPY | Facility: CLINIC | Age: 70
DRG: 057 | End: 2020-09-23
Payer: COMMERCIAL

## 2020-09-23 LAB
ANION GAP SERPL CALCULATED.3IONS-SCNC: 5 MMOL/L (ref 3–14)
BUN SERPL-MCNC: 11 MG/DL (ref 7–30)
CALCIUM SERPL-MCNC: 8.2 MG/DL (ref 8.5–10.1)
CHLORIDE SERPL-SCNC: 114 MMOL/L (ref 94–109)
CO2 SERPL-SCNC: 22 MMOL/L (ref 20–32)
CREAT SERPL-MCNC: 0.57 MG/DL (ref 0.66–1.25)
GFR SERPL CREATININE-BSD FRML MDRD: >90 ML/MIN/{1.73_M2}
GLUCOSE BLDC GLUCOMTR-MCNC: 102 MG/DL (ref 70–99)
GLUCOSE BLDC GLUCOMTR-MCNC: 114 MG/DL (ref 70–99)
GLUCOSE BLDC GLUCOMTR-MCNC: 147 MG/DL (ref 70–99)
GLUCOSE SERPL-MCNC: 92 MG/DL (ref 70–99)
PLATELET # BLD AUTO: 185 10E9/L (ref 150–450)
POTASSIUM SERPL-SCNC: 3.4 MMOL/L (ref 3.4–5.3)
SODIUM SERPL-SCNC: 141 MMOL/L (ref 133–144)

## 2020-09-23 PROCEDURE — 25000128 H RX IP 250 OP 636: Performed by: STUDENT IN AN ORGANIZED HEALTH CARE EDUCATION/TRAINING PROGRAM

## 2020-09-23 PROCEDURE — 25000132 ZZH RX MED GY IP 250 OP 250 PS 637: Performed by: COUNSELOR

## 2020-09-23 PROCEDURE — 85049 AUTOMATED PLATELET COUNT: CPT | Performed by: PSYCHIATRY & NEUROLOGY

## 2020-09-23 PROCEDURE — 94660 CPAP INITIATION&MGMT: CPT

## 2020-09-23 PROCEDURE — 97535 SELF CARE MNGMENT TRAINING: CPT | Mod: GO | Performed by: OCCUPATIONAL THERAPIST

## 2020-09-23 PROCEDURE — 97110 THERAPEUTIC EXERCISES: CPT | Mod: GP | Performed by: PHYSICAL THERAPIST

## 2020-09-23 PROCEDURE — 12000001 ZZH R&B MED SURG/OB UMMC

## 2020-09-23 PROCEDURE — 40000275 ZZH STATISTIC RCP TIME EA 10 MIN

## 2020-09-23 PROCEDURE — 97112 NEUROMUSCULAR REEDUCATION: CPT | Mod: GP | Performed by: PHYSICAL THERAPIST

## 2020-09-23 PROCEDURE — 80048 BASIC METABOLIC PNL TOTAL CA: CPT | Performed by: PSYCHIATRY & NEUROLOGY

## 2020-09-23 PROCEDURE — 97530 THERAPEUTIC ACTIVITIES: CPT | Mod: GP | Performed by: PHYSICAL THERAPIST

## 2020-09-23 PROCEDURE — 36415 COLL VENOUS BLD VENIPUNCTURE: CPT | Performed by: PSYCHIATRY & NEUROLOGY

## 2020-09-23 PROCEDURE — 00000146 ZZHCL STATISTIC GLUCOSE BY METER IP

## 2020-09-23 RX ADMIN — ENOXAPARIN SODIUM 40 MG: 40 INJECTION SUBCUTANEOUS at 09:33

## 2020-09-23 RX ADMIN — LISINOPRIL 40 MG: 20 TABLET ORAL at 09:31

## 2020-09-23 ASSESSMENT — ACTIVITIES OF DAILY LIVING (ADL)
ADLS_ACUITY_SCORE: 22
ADLS_ACUITY_SCORE: 18

## 2020-09-23 NOTE — PROGRESS NOTES
CLINICAL NUTRITION SERVICES    Reviewed nutrition risk factors due to LOS. Pt is tolerating diet, eating well per nursing documentation. Needs help ordering meals and eating, however. No nutrition issues identified at this time. RD will follow via rounds at this time, unless consulted.     Renee Watkins RD, LD, [facility]  Neuro ICU  Pager: [phone]

## 2020-09-23 NOTE — PLAN OF CARE
Discharge Planner OT   Patient plan for discharge: rehab  Current status: pt sat EOB for feeding self breakfast, using finger-walking techniques, and built-up utensils.  SBA ambulation in room and toilet transfer using grab bar.  Dependent for genevieve-cares/clothing management.  Pt reports not completing genevieve-cares x6 months, rather using system of placing paper towel and letting BM dry on paper towel.  Pt left EOB with nursing present. Pt requesting shower, nursing aware.  Barriers to return to prior living situation: weakness, current level of assist with ADLs/transfers, medical status  Recommendations for discharge: TCU, potential to progress to LTC  Rationale for recommendations: Pt is functioning below PLOF and would benefit from continued skilled occupational therapy services to maximize level of IND and safety with ADLs. Pt basic ADLs are significantly impacted, pt lives alone and does not have 24 hour assistance in place.         Entered by: Nan Castellon 09/23/2020 10:36 AM

## 2020-09-23 NOTE — PROGRESS NOTES
"[facility]  General Neurology Progress Note    Patient Name:  John C Dunphy  MRN:  [id number]    :  1950  Date of Service: 2020      Subjective:   \"I'm fine.\" No new endorsed symptoms.     Asymptomatic sinus bradycardia ovrnight. Increases appropriately with wakefulness and movement.    Physical Examination   Vitals: BP (!) 142/82   Pulse 56   Temp 96.8  F (36  C) (Oral)   Resp 18   Ht 1.676 m (5' 6\")   Wt 98.4 kg (216 lb 14.4 oz)   SpO2 97%   BMI 35.01 kg/m    Physical Exam:  Constitutional: Alert. Lying in bed comfortably. No acute distress.   Head: Atraumatic, normocephalic.  ENT: Moist mucous membranes. No sinus drainage.  Cardiovascular: Appears warm, well-perfused, and non-toxic.  Respiratory: No increased work of breathing, no accessory muscle use.   Neurologic: Awake, alert, oriented to self/place/situation. Asking appropriate questions. Speech is fluent, able to comprehend, and follows commands. EOMI without nystagmus, eyes move conjugately. Nasolabial folds symmetric. hearing intact to conversation. Right arm with less than antigravity strength in extensors and flexors, left arm with antigravity and some anti-examiner. No tremor observed. Bilateral UE hypotonia.    On team rounds, Mr. Dunphy is sitting at the edge of the bed eating. He is somewhat disheveled with food over his face and gown. He was using a padded utensil to eat, which he endorses helps somewhat. He uses his left hand to hold the utensil, and brings his face and body down to the plate to eat due to R arm and hand weakness.          Investigations    MRI brain without contrast:   IMPRESSION:    1. Evidence of chronic small vessel change throughout the deep white  matter of both cerebral hemispheres with no acute intracranial  Findings.     MRI cervical spines:  IMPRESSION:  1. Severe degenerative changes of the cervical spine as detailed  above.  2. Deformation of the spinal cord by " posterior disc herniations at the  C2-C3, C3-C4 and C4-C5 levels as detailed above. Small focus of  presumed myelomalacia in the right anterolateral aspect of the  cervical spinal cord at the C4-C5 level likely due to chronic  impingement.  3. Moderate to severe neural foraminal stenosis on the left at C3-C4,  bilaterally at C4-C5, on the left at C5-C6 and on the left at C6-C7.  -----------------------------------------  Nerve Conduction & EMG 9/18/20   Interpretation:     Abnormal study. There is electrodiagnostic evidence of widespread denervation changes affecting the right cervical, thoracic, lumbosacral and possibly bulbar (genioglossus) region. While cervical radiculomyelopathy technically cannot be fully differentiated from motor neuron disease on electrodiagnostic grounds alone, the findings of 1) widespread denervation in multiple regions, 2) extensive fasciculations, 3) lack of or minimal reinnervation changes at the right upper extremity muscles, and 4) low split hand index ratio (<5.2) favor motor neuron disease. Clinical correlation is required.   The study also showed an incidental right ulnar neuropathy at the elbow.   -------------------------------------------------------------------------     Recent Labs   Lab 09/23/20  0622 09/22/20  0559 09/21/20  1626 09/21/20  0615 09/20/20  0747  09/18/20  0618 09/17/20  1401 09/16/20  1745   WBC  --   --   --   --   --   --  7.5  --  10.4   HGB  --   --   --   --   --   --  15.4  --  17.1   MCV  --   --   --   --   --   --  89  --  87     --   --   --  181  --  173  --  207   INR  --   --   --   --   --   --   --  1.09 1.10    141  --  142 140   < > 139  --  141   POTASSIUM 3.4 3.6 3.6 3.3* 3.4   < > 2.9*  --  3.1*   CHLORIDE 114* 113*  --  112* 112*   < > 107  --  106   CO2 22 22  --  25 24   < > 26  --  28   BUN 11 11  --  12 13   < > 10  --  9   CR 0.57* 0.55*  --  0.65* 0.58*   < > 0.67  --  0.64*   ANIONGAP 5 6  --  5 4   < > 6  --  7   MELLY  8.2* 8.7  --  8.8 8.4*   < > 8.1*  --  8.8   GLC 92 107*  --  114* 110*   < > 116*  --  110*   ALBUMIN  --   --   --   --   --   --  2.7*  --  3.4   PROTTOTAL  --   --   --   --   --   --  6.3*  --  7.5   BILITOTAL  --   --   --   --   --   --  0.8  --  0.6   ALKPHOS  --   --   --   --   --   --  78  --  83   ALT  --   --   --   --   --   --  26  --  38   AST  --   --   --   --   --   --  21  --  35   TROPI  --   --   --   --   --   --   --   --  0.045    < > = values in this interval not displayed.         Assessment and Plan/Recs:  Mr. Dunphy is a 69 year old man h/o RAKEL on CPAP, HTN, hypokalemia who presented with bilateral UE weakness since 2018. The right is weaker than the left. No LE weakness. Has reduced sensation in both UEs and LEs. Areflexia in both UEs and preserved reflexes in both LEs. MRI brain with diffuse brain atrophy. MRI cervical spines with severe degenerative changes, foraminal narrowing at multiple levels and cord compression at C4-5 level with small myelomalacia at that level. The C4-5 cervical disc and compression of multiple roots can explain the UE weakness but the absence of LE weakness and urinary incontinence is not typical. The weakness is severe with fasciculation and the sensory changes are not clear, this raises the possibility of motor neuron disease. The fact that he did not seek help for two years and got along with his illness in the setting of inability to eat or take care of himself suggesting cognitive dysfunction and poor judgment. EMG was done 9/18 (report copied above) with widespread denervation in multiple regions and Fasciculations, suggestive of motor neuron disorder, most likely ALS.      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  - Neurosurgery signed off, no intervention indicated     # Hypertension  Pt had hx of hypokalemia, switch Lisinopril-hydrochlorothiazide to Lisinopril  - Discontinued PTA Lisinopril-hydrochlorothiazide 20-12.5 mg daily  - Lisinopril 40 mg daily     #DM type 2  Hgb A1c 6.8, not on medication at home  - Medium intensity sliding scale  - PCP follow up once discharged    # Chronic hypokalemia   - Electrolyte replacement protocol  - Discontinued Hydrochlorothiazide     # RAKEL   - CPAP     #sleep  -melatonin 3mg prn at bedtime (most effective 2 hours prior to desired bedtime)  -decreased vital and neuro checks to q shift and discontinued tele for improved sleep     Diet: Regular diet  DVT ppx: Lovenox  CODE STATUS: Full  Disposition:   Disposition: Patient is medically ready for discharge, awaiting TCU placement.       Patient was seen and discussed with attending neurologist Dr. Chinchilla.    Yaneth Melendez MD  PGY2 Neurology  [phone]    Attending physician: I saw and evaluated the patient with the resident team and I agree with the findings and plan of care as per Dr. Melendez above.    Disposition: Patient remains medically ready for discharge, awaiting TCU placement. He had a number of questions about when and whether he would be able to leave TCU to supervise the closing of his apartment and long term care options; discussed with him that ultimately these questions will need to be further explored during the course of TCU stay.    Dane Resendez MD   of Neurology

## 2020-09-23 NOTE — PROGRESS NOTES
Social Work Services Progress Note    Hospital Day: 8  Date of Initial Social Work Evaluation:  9/18/2020  Collaborated with:  Dr. Melendez, SNF Admissions Coordinator's    Data:  SW is following for discharge planning. A TCU stay is recommended and there is a liklihood that pt will need to remain in the accepting SNF for LTC.    Intervention:  SW attended am unit rounds and per Dr. Melendez, pt is medically ready for discharge.  SW spoke with Admissions (Ann-Marie Ramsey) at [facility] and pt has been assessed and declined for admit due to cpap as the aerosol is a concern as it relates to Covid.  SW spoke with Admissions (Dalila) at [facility] and they did not retain the referral sent this past weekend.  Dalila indicates that they have opening,  SW referred pt and faxed assessment materials.  SW phoned Admissions Coordinator's at the following SNF's  - [facility] ([place] [facility] [place]), no openings on LTC and no openings on TCU.  No openings anticipated in the near future  - [facility] , TCU is full, has LTC openings and pt could receive his rehab on the LTC unit. Referred pt and faxed assessment materials  - [facility] ([place]) so anticipated openings on the TCU in the near future and lengthy wait list for LTC  - [facility], TCU is full with no openings soon, LTC is for [facility] and [facility] and thus, they are not an option  - [facility], left message referring  Pt and faxed assessment materials  - [facility], left message referring  Pt and faxed assessment materials.        Plan:    Anticipated Disposition: Rehab placement in a community SNF with potential for remaining in the accepting SNF for LTC    Barriers to d/c plan:  Pt is medically ready for discharge, secure accepting SNF    Follow Up:  SW will continue to follow.    SHERIDAN Castro  Social Work, 6A  Phone:  [phone]  Pager:   [phone]  9/23/2020

## 2020-09-23 NOTE — PLAN OF CARE
Status: Pt admitted for evaluation of bilateral UE weakness since 2018  Vitals: VSS ex HTN within parameters  Neuros: AxOx4. [place] bilaterally. RUE 2/5, LUE 3/5, BLE 5/5. Decreased sensation in BLE at baseline  IV: PIV SL  Resp/trach: LS clear. CPAP overnight  Diet: Regular diet with assistance ordering and feeding  Bowel status: 1x BM this shift  : Voiding spontaneously via bathroom or urinal  Skin: RUE armpit with blanchable redness. Bilateral groin and scrotum with blanchable redness  Pain: Denies  Activity: Up with assist of 1 and GB  Social: Sister was present during visiting hours  Plan: Continue to monitor and follow POC

## 2020-09-23 NOTE — PLAN OF CARE
Status: Admitted for BUE progressing weakness since 2018  Vitals: Hypertensive within parameters. Can be amparo in 50's. CCM discontinued today. Wears CPAP at night   Neuros: A+O x4. 2/5 RUE, 3/5 LUE. 5/5 BLE. Denied decreased sensation to BLE today.   IV: PIV SL   Resp/trach: CPAP at night   Diet: Regular with assist d/t UE weakness   Bowel status: BM today, loose   : Voiding   Skin: WOC consulted today, scrotal redness + groin redness   Pain: Denied   Activity: A1 GB  Plan: Medically ready for discharge. Awaiting TCU placement. Continue to monitor and follow POC

## 2020-09-23 NOTE — PLAN OF CARE
Pt with intermittent bradycardia in the 50's and HTN within MD parameters.  Wears CPAP at night.  Denied pain.  Neuro deficits include:  RUE 2/5, LUE 3/5, and decreased sensation to bottom of bilat feet.  On continuous cardiac monitoring; no afib noted.  PIV SL.  On regular diet-needs assist to order and eat.  Voiding spontaneously in urinal; .   Last BM 9/22.  Up with 1, GB.  Redness to groin, scrotum and R armpit.  Waiting TCU placement.  Continue with POC.

## 2020-09-23 NOTE — PLAN OF CARE
PT 6A: Discharge Planner PT   Patient plan for discharge: rehab  Current status: pt ambulates ~600ft during session with CGA. No overt LOB, pt generally fatigued and SOB with activity. Participates in standing balance exercises with CGA-min A, continues to be limited by BUE weakness.   Barriers to return to prior living situation: impaired balance and gait, impulsivity, current level of assist required, lives alone, falls risk (see DGI data from 9/21)    Recommendations for discharge: TCU, potential for LTC  Rationale for recommendations: Patient is functioning below previous baseline and would benefit from continued therapy services. BUE weakness is significantly impacting mobility and self-care within the patient's home, patient lives alone and does not have 24hr assist available.       Entered by: Renee Diez 09/23/2020 4:26 PM

## 2020-09-24 ENCOUNTER — APPOINTMENT (OUTPATIENT)
Dept: PHYSICAL THERAPY | Facility: CLINIC | Age: 70
DRG: 057 | End: 2020-09-24
Payer: COMMERCIAL

## 2020-09-24 ENCOUNTER — DOCUMENTATION ONLY (OUTPATIENT)
Dept: OTHER | Facility: CLINIC | Age: 70
End: 2020-09-24

## 2020-09-24 LAB
ANION GAP SERPL CALCULATED.3IONS-SCNC: 6 MMOL/L (ref 3–14)
BUN SERPL-MCNC: 11 MG/DL (ref 7–30)
CALCIUM SERPL-MCNC: 8.2 MG/DL (ref 8.5–10.1)
CHLORIDE SERPL-SCNC: 113 MMOL/L (ref 94–109)
CO2 SERPL-SCNC: 22 MMOL/L (ref 20–32)
CREAT SERPL-MCNC: 0.5 MG/DL (ref 0.66–1.25)
GFR SERPL CREATININE-BSD FRML MDRD: >90 ML/MIN/{1.73_M2}
GLUCOSE SERPL-MCNC: 98 MG/DL (ref 70–99)
POTASSIUM SERPL-SCNC: 3.5 MMOL/L (ref 3.4–5.3)
SODIUM SERPL-SCNC: 141 MMOL/L (ref 133–144)

## 2020-09-24 PROCEDURE — 25000128 H RX IP 250 OP 636: Performed by: STUDENT IN AN ORGANIZED HEALTH CARE EDUCATION/TRAINING PROGRAM

## 2020-09-24 PROCEDURE — G0463 HOSPITAL OUTPT CLINIC VISIT: HCPCS

## 2020-09-24 PROCEDURE — 97116 GAIT TRAINING THERAPY: CPT | Mod: GP

## 2020-09-24 PROCEDURE — 36415 COLL VENOUS BLD VENIPUNCTURE: CPT | Performed by: PHYSICAL MEDICINE & REHABILITATION

## 2020-09-24 PROCEDURE — 12000001 ZZH R&B MED SURG/OB UMMC

## 2020-09-24 PROCEDURE — 40000275 ZZH STATISTIC RCP TIME EA 10 MIN

## 2020-09-24 PROCEDURE — 80048 BASIC METABOLIC PNL TOTAL CA: CPT | Performed by: PHYSICAL MEDICINE & REHABILITATION

## 2020-09-24 PROCEDURE — 25000132 ZZH RX MED GY IP 250 OP 250 PS 637: Performed by: STUDENT IN AN ORGANIZED HEALTH CARE EDUCATION/TRAINING PROGRAM

## 2020-09-24 PROCEDURE — 94660 CPAP INITIATION&MGMT: CPT

## 2020-09-24 PROCEDURE — 25000132 ZZH RX MED GY IP 250 OP 250 PS 637: Performed by: COUNSELOR

## 2020-09-24 RX ORDER — AMLODIPINE BESYLATE 2.5 MG/1
2.5 TABLET ORAL DAILY
Status: DISCONTINUED | OUTPATIENT
Start: 2020-09-24 | End: 2020-09-25 | Stop reason: HOSPADM

## 2020-09-24 RX ADMIN — ENOXAPARIN SODIUM 40 MG: 40 INJECTION SUBCUTANEOUS at 09:55

## 2020-09-24 RX ADMIN — AMLODIPINE BESYLATE 2.5 MG: 2.5 TABLET ORAL at 10:13

## 2020-09-24 RX ADMIN — LISINOPRIL 40 MG: 20 TABLET ORAL at 09:54

## 2020-09-24 ASSESSMENT — ACTIVITIES OF DAILY LIVING (ADL)
ADLS_ACUITY_SCORE: 18

## 2020-09-24 ASSESSMENT — VISUAL ACUITY: OU: NORMAL ACUITY

## 2020-09-24 NOTE — PROGRESS NOTES
Social Work Services Progress Note    Hospital Day: 9  Date of Initial Social Work Evaluation:  9/18/2020  Collaborated with:  Dr. Melendez, SNF Admissions Coordinator,  Patient and patients sister (Marleny)    Data:  SW is following for discharge planning.  SNF placement for rehab and probable long term care is anticipated.      Intervention:  VIVIENNE attended am unit rounds and Dr. Melendez confirmed readiness for discharge.  VIVIENNE spoke with Admissions at [facility] ([place]) and at [facility] ([place]) and both have assessed and approved pt for admit but will not have beds available until 9/25/2020. VIVIENNE met with pt (and phoned pt's sister Marleny) and informed that pt was approved for admit to both [facility] and to [facility].  SW informed pt and sister that both facility's would plan to place pt on their long term care unit where he would receive his rehab services.  Pt states that he prefers to stay near to his home neighborhood and is familiar with the area that [facility] is located.  Pt's sister states that a good friends father stay at [facility] and had a good experience. Pt states that he chooses to accept the available bed at [facility].   VIVIENNE informed pt that per Admissions at [facility], pt can choose to be followed by the [facility] team which would mean that future hospitalizations were at [facility] or he can choose to be followed by the Medical Director of [facility] and if he chose this option, pt could elect [facility] as his hospital preference. Pt states that he would like to be followed by the Medical Director and SW has relayed this to [place] in Admissions.  VIVIENNE also arranged for pt's completed Health Care Directive to be notarized.    Assessment: Pt and sister voice understanding of the discharge plan and agreement with the discharge plan.    Plan:    Anticipated Disposition: SNF placement at [facility]    Barriers to d/c plan:  None at this time    Follow Up:  SW will coordinate discharge.    SHERIDAN Castro  Social Work, 6A  Phone:  [phone]  Pager:  [phone]  9/25/2020

## 2020-09-24 NOTE — CONSULTS
S: [facility] asked to assess for possible pressure injury to scrotum and sacrum.    B: History per MD notes: 69 year old man with chronic progressive upper extremity weakness with electrodiagnostic and clinical findings suggestive of ALS without bulbar involvement.  He is unsafe at his baseline level of function and supervision.  He requires training and education with occupational and physical therapy and close outpatient follow up.  He is unlikely to qualify for acute rehabilitation due to his expected prognosis and low acute medical management needs, though he would benefit from discharge planning and continued training in a rehabilitation setting.     A: Assess scrotum, groin, sacrum and perineal skin.  Skin is mildly erythemic with no odor.  Skin is intact and easily blanchable.  No evidence of pressure injury or fungal infection at this time.      R: No need for topical treatment at this time.      Cari cares per unit protocol    Lachelle Hanks RN BSN CWOCN

## 2020-09-24 NOTE — PLAN OF CARE
Status: Admitted for BUE progressing weakness since 2018, possible ALS   Vitals: Hypertensive out of parameters, gave scheduled BP meds and resolved with oral medication. Can be amparo in 50's. Wears CPAP at night   Neuros: A+O x4. 2/5 RUE, 3/5 LUE. 5/5 BLE. Denied decreased sensation to BLE today.   IV: PIV SL   Resp/trach: CPAP at night   Diet: Regular with assist d/t UE weakness   Bowel status: No BM today  : Voiding   Skin: WOC consulted today for scrotal redness, came to assess and not concerned about the area   Pain: Denied   Activity: A1+GB  Plan: Medically ready for discharge. Awaiting TCU placement. Possibly tomorrow. Continue to monitor and follow POC

## 2020-09-24 NOTE — PLAN OF CARE
Discharge Planner PT   Patient plan for discharge: TCU  Current status:  STS with CGAx1.  Ambulates ~600ft x2 with CGAx1 -no AD.  No UE swing present 2/2 BUE weakness. Ascends and descends 6 steps with single railing and CGAx1.    Barriers to return to prior living situation: impaired balance and gait, impulsivity, current level of assist required, lives alone, falls risk (see DGI data from 9/21)    Recommendations for discharge: TCU, potential for LTC  Rationale for recommendations: Patient is functioning below previous baseline and would benefit from continued therapy services. BUE weakness is significantly impacting mobility and self-care within the patient's home, patient lives alone and does not have 24hr assist available.       Entered by: Joey Becker 09/24/2020 4:12 PM
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
## 2020-09-24 NOTE — PLAN OF CARE
Status: Pt admitted for evaluation of bilateral UE weakness since 2018  Vitals: HTN within parameters. Can be amparo in 50s. CCM discontinued today.   Neuros: AxOx4. RUE 2/5, LUE 3/5, BLE 5/5  IV: PIV SL  Resp/trach: LS clear, wears CPAP at night  Diet: Regular diet with assistance ordering and feeding. Able to use adaptive device and feed self fairly well with some assistance as well  Bowel status: No BM this shift  : Voiding spontaneously  Skin: WOC consulted today, scrotal redness + groin redness   Pain: Denies  Activity: A1 GB  Plan: Medically ready for discharge. Awaiting TCU placement. Continue to monitor and follow POC

## 2020-09-24 NOTE — PROGRESS NOTES
"Status: Pt admitted for bilateral UE weakness.   VS:  BP (!) 145/83   Pulse 66   Temp 96.5  F (35.8  C) (Oral)   Resp 16   Ht 1.676 m (5' 6\")   Wt 98.4 kg (216 lb 14.4 oz)   SpO2 96%   BMI 35.01 kg/m    Pt received 40 mg lisinopril and started on 2.5 mg amlodipine, improved previous hypertensive episode   Neuros: A&Ox4, neuro intact ex RUE weakness 2/5 strength, LUE weakness 3/5 strength. Bed alarm on for safety, instructed to use call light before getting out of bed. Requires feeding assistance w/ meals  GI/: Voiding spontaneously with urinal. No BM this shift.   Respiratory: Lung sounds clear bilaterally, oxygenating 96% RA   Skin: no deficits. [facility] nurse assessed scrotum and sacrum, no deficits noted.   IV: PIV R forearm, sl'd   Activity: Up w/ 1 assist, repositions independently   Pain: denies  Labs/Tests: NA  Plan of care: Awaiting TCU placement, pt aware of plan w/o concerns, will continue to monitor.     "

## 2020-09-24 NOTE — PLAN OF CARE
"BP (!) 161/87   Pulse 59   Temp 98.7  F (37.1  C) (Oral)   Resp 18   Ht 1.676 m (5' 6\")   Wt 98.4 kg (216 lb 14.4 oz)   SpO2 97%   BMI 35.01 kg/m    Neuro: A&Ox4. Bed alarm maintained for safety.   Cardiac: Afebrile, VSS.   Respiratory: bipap on during hs   GI/: Voiding spontaneously. No BM this shift.   Diet/appetite: Tolerating reg diet. Needs assist to order and feed. Denies nausea   Activity: Up with assist of 1/gb.  Pain: Denies   Skin: No new deficits noted.  Lines: piv; sl'd    Rested btwn cares. Able to make needs known. Awaiting TCU placement.    "

## 2020-09-25 ENCOUNTER — APPOINTMENT (OUTPATIENT)
Dept: OCCUPATIONAL THERAPY | Facility: CLINIC | Age: 70
DRG: 057 | End: 2020-09-25
Payer: COMMERCIAL

## 2020-09-25 VITALS
DIASTOLIC BLOOD PRESSURE: 88 MMHG | RESPIRATION RATE: 16 BRPM | BODY MASS INDEX: 34.86 KG/M2 | HEART RATE: 55 BPM | OXYGEN SATURATION: 95 % | HEIGHT: 66 IN | TEMPERATURE: 97.7 F | SYSTOLIC BLOOD PRESSURE: 163 MMHG | WEIGHT: 216.9 LBS

## 2020-09-25 LAB
ANION GAP SERPL CALCULATED.3IONS-SCNC: 7 MMOL/L (ref 3–14)
BUN SERPL-MCNC: 10 MG/DL (ref 7–30)
CALCIUM SERPL-MCNC: 8.2 MG/DL (ref 8.5–10.1)
CHLORIDE SERPL-SCNC: 112 MMOL/L (ref 94–109)
CO2 SERPL-SCNC: 22 MMOL/L (ref 20–32)
CREAT SERPL-MCNC: 0.55 MG/DL (ref 0.66–1.25)
GFR SERPL CREATININE-BSD FRML MDRD: >90 ML/MIN/{1.73_M2}
GLUCOSE SERPL-MCNC: 95 MG/DL (ref 70–99)
POTASSIUM SERPL-SCNC: 3.5 MMOL/L (ref 3.4–5.3)
SODIUM SERPL-SCNC: 141 MMOL/L (ref 133–144)

## 2020-09-25 PROCEDURE — 80048 BASIC METABOLIC PNL TOTAL CA: CPT | Performed by: PHYSICAL MEDICINE & REHABILITATION

## 2020-09-25 PROCEDURE — 97535 SELF CARE MNGMENT TRAINING: CPT | Mod: GO

## 2020-09-25 PROCEDURE — 94660 CPAP INITIATION&MGMT: CPT

## 2020-09-25 PROCEDURE — 25000132 ZZH RX MED GY IP 250 OP 250 PS 637: Performed by: STUDENT IN AN ORGANIZED HEALTH CARE EDUCATION/TRAINING PROGRAM

## 2020-09-25 PROCEDURE — 25000128 H RX IP 250 OP 636: Performed by: STUDENT IN AN ORGANIZED HEALTH CARE EDUCATION/TRAINING PROGRAM

## 2020-09-25 PROCEDURE — 25000132 ZZH RX MED GY IP 250 OP 250 PS 637: Performed by: COUNSELOR

## 2020-09-25 PROCEDURE — 36415 COLL VENOUS BLD VENIPUNCTURE: CPT | Performed by: PHYSICAL MEDICINE & REHABILITATION

## 2020-09-25 PROCEDURE — 40000275 ZZH STATISTIC RCP TIME EA 10 MIN

## 2020-09-25 RX ORDER — AMLODIPINE BESYLATE 2.5 MG/1
2.5 TABLET ORAL DAILY
Qty: 90 TABLET | Refills: 0 | Status: SHIPPED | OUTPATIENT
Start: 2020-09-25

## 2020-09-25 RX ORDER — LISINOPRIL 40 MG/1
40 TABLET ORAL DAILY
Qty: 90 TABLET | Refills: 0 | Status: SHIPPED | OUTPATIENT
Start: 2020-09-25

## 2020-09-25 RX ADMIN — ENOXAPARIN SODIUM 40 MG: 40 INJECTION SUBCUTANEOUS at 11:41

## 2020-09-25 RX ADMIN — AMLODIPINE BESYLATE 2.5 MG: 2.5 TABLET ORAL at 08:07

## 2020-09-25 RX ADMIN — LISINOPRIL 40 MG: 20 TABLET ORAL at 08:07

## 2020-09-25 ASSESSMENT — VISUAL ACUITY: OU: NORMAL ACUITY

## 2020-09-25 ASSESSMENT — ACTIVITIES OF DAILY LIVING (ADL)
ADLS_ACUITY_SCORE: 18

## 2020-09-25 NOTE — PLAN OF CARE
Physical Therapy Discharge Summary    Reason for therapy discharge:    Discharged to transitional care facility.    Progress towards therapy goal(s). See goals on Care Plan in [facility] electronic health record for goal details.  Goals partially met.  Barriers to achieving goals:   discharge from facility.    Therapy recommendation(s):    Continued therapy is recommended.  Rationale/Recommendations:  TCU.

## 2020-09-25 NOTE — PLAN OF CARE
Discharge Planner OT   Patient plan for discharge: TCU  Current status: Completed a shower to assess carry over of discussed compensatory strategies and level of safety. Pt demonstrated ability to bath anterior portion LB w/ min-modA and total assist for posterior LB. UB bathing required total assist for posterior, and maxA for anterior side. Facilitated standing grooming. Pt initiated with compensatory strategies (arm on countertop, leaning forward, and squatting) to comb hair w/ modA.   Barriers to return to prior living situation: medical status, BUE weakness, level of assist  Recommendations for discharge: TCU  Rationale for recommendations: Pt is functioning below baseline and would benefit from continued skilled occupational therapy to facilitate IND through compensatory strategies and environmental adaptations to more safely complete ADLs and provide further discharge planning. Pt was living alone and doesn't have supports in place to provide 24/7 care.        Entered by: Zoya Cabrera 09/25/2020 11:33 AM        Occupational Therapy Discharge Summary    Reason for therapy discharge:    Discharged to transitional care facility.    Progress towards therapy goal(s). See goals on Care Plan in [facility] electronic health record for goal details.  Goals partially met.  Barriers to achieving goals:   discharge from facility.    Therapy recommendation(s):    Continued therapy is recommended.  Rationale/Recommendations:  Patient would benefit from skilled occupational therapy to promote compensatory strategies and environmental adaptations to safely complete ADLs and maximize independence. .

## 2020-09-25 NOTE — PROGRESS NOTES
Status: Admitted for BUE progressing weakness, possible ALS   Vitals: VSS, on RA.  Wears CPAP at night  Neuros: A&Ox4.  Forgetful.  [place].  RUE 2/5, LUE 3/5, BLE 5/5  IV: PIV SL  Resp/trach: WDL, denies SOB  Diet: regular  Bowel status: Large, diarrhea at 0500, using BSC  : Voiding spont via urinal  Skin: scrotal redness  Pain: denies  Activity: Up with A1, GB  Plan: Discharge today at noon.      Patient has order for 'do not disturb at night'.  Assessment done at 0500, once patient woke up.

## 2020-09-25 NOTE — PROGRESS NOTES
Social Work Services Discharge Note      Patient Name:  John C Dunphy     Anticipated Discharge Date:  9/25/2020    Discharge Disposition:   [facility] or [facility] ([phone])    Following MD:  Facility Assignment     Pre-Admission Screening (PAS) online form has been completed.  The Level of Care (LOC) is:  Determined  Confirmation Code is:  [id number].  Patient/caregiver informed of referral to [facility] for Pre-Admission Screening for skilled nursing facility (SNF) placement and to expect a phone call post discharge from SNF.     Additional Services/Equipment Arranged:  SW confirmed readiness for discharge with Dr. Melendez.  SW confirmed acceptance for admit to [facility] with Admissions ([place]). SW arranged for  [facility] (ben [phone]) to provide w/c transport at 12 noon.     Patient / Family response to discharge plan:  Pt and sister (Marleny) voice understanding of the discharge plan and agreement with the discharge plan.     Persons notified of above discharge plan:  Pt, sister (Marleny), 6A nursing and Dr. Melendez.    Staff Discharge Instructions:  Please fax discharge orders and signed hard scripts for any controlled substances (SW completed this task).  Please print a packet and send with patient.     CTS Handoff completed:  NO, as no PCP listed on Admissions Facesheet.    Medicare Notice of Rights provided to the patient/family:  YES    SHERIDAN Castro  Social Work, 6A  Phone:  [phone]  Pager:  [phone]  9/25/2020

## 2020-09-25 NOTE — PLAN OF CARE
Status: Admitted for BUE progressing weakness, possible ALS   Vitals: VSS on RA, wears CPAP at night   Neuros: Alert & oriented x4, forgetful at times, [place], 2/5 RUE, 3/5 LUE, 5/5 BLE  IV: PIV SL  Resp/trach: Denies SOB  Diet: Regular diet  Bowel status: BS+  : Voiding via urinal  Skin: Scrotal redness  Pain: Pt reporting pain on the L flank, MDs aware  Activity: Up with 1/GB  Social: Wife at bedside for portion of shift, involved in care   Plan: Continue to monitor and follow POC, discharging at noon tomorrow

## 2020-09-25 NOTE — PLAN OF CARE
Pt alert and oriented x4. Continues to have BUE weakness with L stronger than R. BLE strength intact. VSS on RA except mild htn up to 160s/80s. Scheduled BP meds given with improvement to 140s systolic. Electrolytes WNL. Pt completed shower with OT, other hygrine cares with nursing staff. Pt tolerating regular diet with good appetite.  MD placed discharge order. PIV removed. AVS printed and reviewed with pt and sister. All questions answered. All belongings going with pt. [facility] picked pt up at 1200 for discharge to SNF. Adequate for discharge

## 2020-10-05 ENCOUNTER — DOCUMENTATION ONLY (OUTPATIENT)
Dept: CARE COORDINATION | Facility: CLINIC | Age: 70
End: 2020-10-05

## 2020-10-28 DIAGNOSIS — G12.21 ALS (AMYOTROPHIC LATERAL SCLEROSIS) (H): Primary | ICD-10-CM

## 2020-10-29 ENCOUNTER — OFFICE VISIT (OUTPATIENT)
Dept: NEUROLOGY | Facility: CLINIC | Age: 70
End: 2020-10-29
Payer: COMMERCIAL

## 2020-10-29 VITALS
OXYGEN SATURATION: 97 % | SYSTOLIC BLOOD PRESSURE: 159 MMHG | HEART RATE: 73 BPM | DIASTOLIC BLOOD PRESSURE: 90 MMHG | BODY MASS INDEX: 34.7 KG/M2 | WEIGHT: 215 LBS

## 2020-10-29 DIAGNOSIS — E11.40 TYPE 2 DIABETES MELLITUS WITH DIABETIC NEUROPATHY, WITHOUT LONG-TERM CURRENT USE OF INSULIN (H): ICD-10-CM

## 2020-10-29 DIAGNOSIS — G12.21 BRACHIAL AMYOTROPHIC DIPLEGIA (H): ICD-10-CM

## 2020-10-29 DIAGNOSIS — B18.1 CHRONIC VIRAL HEPATITIS B WITHOUT DELTA AGENT AND WITHOUT COMA (H): ICD-10-CM

## 2020-10-29 DIAGNOSIS — G12.21 ALS (AMYOTROPHIC LATERAL SCLEROSIS) (H): ICD-10-CM

## 2020-10-29 DIAGNOSIS — B18.1 CHRONIC VIRAL HEPATITIS B WITHOUT DELTA AGENT AND WITHOUT COMA (H): Primary | ICD-10-CM

## 2020-10-29 PROBLEM — E11.9 DIABETES MELLITUS, TYPE 2 (H): Status: ACTIVE | Noted: 2020-10-29

## 2020-10-29 LAB
EXPTIME-PRE: 10.9 SEC
FEF2575-%PRED-PRE: 84 %
FEF2575-PRE: 1.87 L/SEC
FEF2575-PRED: 2.22 L/SEC
FEFMAX-%PRED-PRE: 82 %
FEFMAX-PRE: 6.16 L/SEC
FEFMAX-PRED: 7.48 L/SEC
FEV1-%PRED-PRE: 81 %
FEV1-PRE: 2.29 L
FEV1FEV6-PRE: 79 %
FEV1FEV6-PRED: 78 %
FEV1FVC-PRE: 78 %
FEV1FVC-PRED: 77 %
FIFMAX-PRE: 3.88 L/SEC
FVC-%PRED-PRE: 80 %
FVC-PRE: 2.95 L
FVC-PRED: 3.69 L
MEP-PRE: 125 CMH2O
MIP-PRE: -83 CMH2O

## 2020-10-29 PROCEDURE — 36415 COLL VENOUS BLD VENIPUNCTURE: CPT | Performed by: PATHOLOGY

## 2020-10-29 PROCEDURE — 94799 UNLISTED PULMONARY SVC/PX: CPT | Performed by: INTERNAL MEDICINE

## 2020-10-29 PROCEDURE — 99000 SPECIMEN HANDLING OFFICE-LAB: CPT | Performed by: PATHOLOGY

## 2020-10-29 PROCEDURE — 94375 RESPIRATORY FLOW VOLUME LOOP: CPT | Performed by: INTERNAL MEDICINE

## 2020-10-29 PROCEDURE — 87517 HEPATITIS B DNA QUANT: CPT | Mod: 90 | Performed by: PATHOLOGY

## 2020-10-29 PROCEDURE — 99205 OFFICE O/P NEW HI 60 MIN: CPT | Performed by: PSYCHIATRY & NEUROLOGY

## 2020-10-29 NOTE — LETTER
Date:November 14, 2020      Patient was self referred, no letter generated. Do not send.        [facility] Health Information

## 2020-10-29 NOTE — PATIENT INSTRUCTIONS
Follow up 3 months    I will let you know what my colleague thinks about your cervical spine stenosis/pinched nerves.    I personally do not believe that your condition is caused by bone spurs or discs causing pinching of nerves in your spinal cord.     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 This is different from typical ALS in that it progresses much slower, and legs, breathing, or swallowing muscles may not be affected for years.    Check hepatitis B DNA levels today- if very high I will send you to a liver doctor  If low, we may be able to prescribe you a medication called riluzole that can slow down progression of motor neuron disease by a few months    Occupational therapy evaluation today

## 2020-10-29 NOTE — PROGRESS NOTES
"Service Date: 10/29/2020      2020      Darwin Kelsey DO    [facility] - Neurology    [address] [place]  [address]        Dane Resendez MD   [facility] - Neurology   [address] [place]  [address]      RE: John Dunphy   MRN: [id number]   : 1950      Dear Doctors:      I had the pleasure to see Mr. Dunphy to establish care at the [facility] today.  Mr. Dunphy is a 69-year-old man with about a 2-1/2-year history of arm weakness that began around 2018 with difficulty raising the right arm and elevating the right shoulder which was becoming increasingly challenging.  Over the next 1-2 years, he gradually developed similar problems in the left shoulder and arm, and he then lost fine motor movements and dexterity in the right more than left hand. At this point, he does not trust his right hand  when he walks stairs and he needs to hold on the handrail.  He cannot hold a pen and write with his right hand and he has difficulty even signing his name.  The weakness has clearly progressed over the past 2 years. He denies cramping or twitching in the muscles of his upper extremity.  He has noted some twitching in his thighs muscles, right more than left, about 6 months ago.  No twitching in his calf or foot muscles.      He does not have a hard time pushing himself off the chair, but he is challenged to turn in bed and get around, which may be due to his arm weakness.  He also reports that he is more cautious or slow when walking which he attributes  to sedentary lifestyle.  He had a fall down stairs in his basement 2 years ago, and he believes that is around the time the symptoms started.      He describes an odd sensation of the bottom of his feet \"as if he has extra layers of skin\", but he does not have any burning or sharp pains there and he denies " any sensory complaints in his upper extremities.  He has no incontinence of bowel or bladder or Lhermitte sign.      There is no family history of any neurodegenerative condition.  He quit smoking several years ago.  Drinks alcohol rarely.  No illicit drug use.  His father had a history of bone spurs in his neck requiring surgery.      Mr Dunphy denies any dysphagia, dysarthria, sialorrhea, pseudobulbar affect, difficulty chewing, dyspnea on exertion, or orthopnea.  He was diagnosed with type 2 diabetes during his recent hospital stay at the [facility], and he also has hypertension managed with amlodipine and lisinopril.  He has not had any surgeries, except for a hernia surgery at age 4.  He is not allergic to any medication.      He was admitted to the [facility] for further evaluation of his condition from 09/16 to 09/25.  He underwent an MRI of the brain and cervical spine.  Brain shows chronic small-vessel ischemic changes.  Cervical spine MRI showed severe degenerative changes at C2-C3, C3-C4 and C4-C5 with a small focus of myelomalacia in the right anterolateral C4-C5.  Neurosurgery was consulted and they did not think he was a surgical candidate.  He also is known to have obstructive sleep apnea and CPAP was started at night.    An EMG performed during his last hospitalization, showed severe denervation in upper extremity muscles on the right, but there were also denervation of the thoracic paraspinals and right leg muscles including TA, gastrocnemius, and vastus lateralis, with fasciculations in several muscles.  There was a low split hand index ratio and very small CMAPs in the upper extremities.  Sensory nerve conduction studies were normal with the exception of the right ulnar SNAP amplitude which was reduced, likely due to coexisting right ulnar neuropathy at the elbow.      He was diagnosed with flail arm syndrome and neck surgery was not recommended. He is here to establish  care.      PAST MEDICAL HISTORY, MEDICATIONS, ALLERGIES, FAMILY HISTORY, SOCIAL HISTORY, REVIEW OF SYSTEMS: As outlined in prior notes that I reviewed.       PHYSICAL EXAMINATION:  BP (!) 159/90   Pulse 73   Wt 97.5 kg (215 lb)   SpO2 97%   BMI 34.70 kg/m       PFT Latest Ref Rng & Units 10/29/2020   FVC L 2.95   FEV1 L 2.29   FVC% % 80   FEV1% % 81     MIP -83 cm H2O (normal).    He is awake, alert, oriented x3.  He is able to provide a coherent history.  He perhaps has mild aponeurotic ptosis bilaterally, but it does not become fatigable with sustained upward gaze.  There is no diplopia or ophthalmoparesis.  There is very mild weakness of orbicularis oculi but no weakness of orbicularis oris.  Cheek puff is equivocally weak.  Tongue does not show definite atrophy or fasciculations.  Lateral tongue movements are done very fast.  There is no jaw jerk.  There is no dysarthria of spastic or other quality, and no dysphonia.  Uvula and palate seem to be elevating normally in the midline.  Neck flexion and extension strength is full.      He has near complete paralysis of bilateral upper extremities.  Strength is as follows (MRC, right/left):  Deltoids 1/0, biceps 0/2-, triceps 2/3, wrist extensors 1/2.  Finger extensors 0/2, FDI 0/3, APB less than 3/4-; in the legs he has very mild weakness of right hip flexion at 4+ and right EHL at 4, but left hip flexion, bilateral knee extension, bilateral knee flexion, andbilateral foot dorsiflexion are 5.  Tone is normal in arms and legs.  There is no spasticity or rigidity.  He does have atrophy of muscles in bilateral arms diffusely, but I saw scant, if any, fasciculations mostly in the biceps and  the forearm.  I did not see any fasciculations in the legs, and the bulk of his leg muscles is intact.      His reflexes are absent the upper extremities (biceps, triceps and brachioradialis), 2+ at the knees and absent at the ankles.  Toes are downgoing.  There is no Light  response.  There is no pectoralis response.  Sensation is intact to all modalities in entire upper extremities including pinprick, light touch, vibration, position sensation.  Vibration is frankly intact at the toes too, over 9 seconds, and so is position sensation and pinprick.  He cannot do finger-to-nose due to weakness.  Gait was not assessed.      IMPRESSION:     1.  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    2.  Most likely incidental cervical spondylosis with myelopathy.      Mr. Dunphy represents a challenging diagnostic dilemma.  I spent about 60 minutes with him, of which more than half was counseling.  Those cases are among the hardest with deal with, and differentiating between a purely lower-motor-neuron variant of motor neuron disease and cervical spondylosis with myelopathy can be difficult.  However, there are several things that argue against the latter: 1) The complete lack of any sensory dysfunction of the upper extremities 2) the lack of clear long tract signs (he has no spasticity or hyperreflexia in the legs), 3) The EMG findings of low split hand index and denervation in thoracic paraspinals and lumbar muscles, which would not be expected with cervical polyradiculopathy or myelopathy.      I explained to him that flail arm syndrome (also known as brachial amyotrophic diplegia) is a variant of motor neuron disease and technically is not classified as ALS.  In order to make an ALS diagnosis, combined upper and lower motor neuron signs are required, and Mr Dunphy has no convincing upper motor neuron signs on his exam.  This condition has a much better prognosis than ALS and a slower progression. There are individuals that can live 10+ years with little affliction of the leg, bulbar or respiratory muscles. However, it remains incurable to date.    His breathing function assessed by spirometry today was very satisfactory. He will be evaluated by  Occupational Therapy.  He understands that if this is flail arm syndrome, there is not much that can be done to reverse his weakness and he is going to have to learn to live with this arm disability.      We discussed the prescription of riluzole.  Although there are not a lot of data supporting its efficacy for this particular variant of motor neuron disease, I think it is worth considering.  However, he reported a history of hepatitis B in the 1970s, that was never treated, and for that reason, I would like to get a hepatitis B virus load (DNA) with PCR.  If the values are very high, I am referring him to Hepatology and I will not give him riluzole.  If the values are low and his recent liver function tests were normal (AST, ALT were checked in 2020), I would consider riluzole with Hepatology clearance.  He is not a candidate for Radicava because he does not meet clinical criteria for a diagnosis of ALS.  He will be seen in followup in our clinic in 3 months or sooner if needed.        Sincerely,            MD JACKY Smith MD             D: 10/29/2020   T: 10/29/2020   MT: CLARE      Name:     DUNPHY, JOHN   MRN:      [id number]        Account:      [id number]   :      1950           Service Date: 10/29/2020      Document: [id number]

## 2020-10-29 NOTE — LETTER
10/29/2020       RE: John C Dunphy   [address] [facility] [address]     Dear Colleague,    Thank you for referring your patient, John C Dunphy, to the [facility] [place] at [facility]. Please see a copy of my visit note below.    Service Date: 10/29/2020      2020      Darwin Kelsey DO    [facility] [place] - Neurology    [address] [place]  [address]        Dane Resendez MD   [facility] - Neurology   [address] [place]  [address]      RE: John Dunphy   MRN: [id number]   : 1950      Dear Doctors:      I had the pleasure to see Mr. Dunphy to establish care at the [facility] today.  Mr. Dunphy is a 69-year-old man with about a 2-1/2-year history of arm weakness that began around 2018 with difficulty raising the right arm and elevating the right shoulder which was becoming increasingly challenging.  Over the next 1-2 years, he gradually developed similar problems in the left shoulder and arm, and he then lost fine motor movements and dexterity in the right more than left hand. At this point, he does not trust his right hand  when he walks stairs and he needs to hold on the handrail.  He cannot hold a pen and write with his right hand and he has difficulty even signing his name.  The weakness has clearly progressed over the past 2 years. He denies cramping or twitching in the muscles of his upper extremity.  He has noted some twitching in his thighs muscles, right more than left, about 6 months ago.  No twitching in his calf or foot muscles.      He does not have a hard time pushing himself off the chair, but he is challenged to turn in bed and get around, which may be due to his arm weakness.  He also reports that he is more cautious or slow when walking which he attributes  to  "sedentary lifestyle.  He had a fall down stairs in his basement 2 years ago, and he believes that is around the time the symptoms started.      He describes an odd sensation of the bottom of his feet \"as if he has extra layers of skin\", but he does not have any burning or sharp pains there and he denies any sensory complaints in his upper extremities.  He has no incontinence of bowel or bladder or Lhermitte sign.      There is no family history of any neurodegenerative condition.  He quit smoking several years ago.  Drinks alcohol rarely.  No illicit drug use.  His father had a history of bone spurs in his neck requiring surgery.      Mr Dunphy denies any dysphagia, dysarthria, sialorrhea, pseudobulbar affect, difficulty chewing, dyspnea on exertion, or orthopnea.  He was diagnosed with type 2 diabetes during his recent hospital stay at the [facility], and he also has hypertension managed with amlodipine and lisinopril.  He has not had any surgeries, except for a hernia surgery at age 4.  He is not allergic to any medication.      He was admitted to the [facility] for further evaluation of his condition from 09/16 to 09/25.  He underwent an MRI of the brain and cervical spine.  Brain shows chronic small-vessel ischemic changes.  Cervical spine MRI showed severe degenerative changes at C2-C3, C3-C4 and C4-C5 with a small focus of myelomalacia in the right anterolateral C4-C5.  Neurosurgery was consulted and they did not think he was a surgical candidate.  He also is known to have obstructive sleep apnea and CPAP was started at night.    An EMG performed during his last hospitalization, showed severe denervation in upper extremity muscles on the right, but there were also denervation of the thoracic paraspinals and right leg muscles including TA, gastrocnemius, and vastus lateralis, with fasciculations in several muscles.  There was a low split hand index ratio and very small CMAPs in the " upper extremities.  Sensory nerve conduction studies were normal with the exception of the right ulnar SNAP amplitude which was reduced, likely due to coexisting right ulnar neuropathy at the elbow.      He was diagnosed with flail arm syndrome and neck surgery was not recommended. He is here to establish care.      PAST MEDICAL HISTORY, MEDICATIONS, ALLERGIES, FAMILY HISTORY, SOCIAL HISTORY, REVIEW OF SYSTEMS: As outlined in prior notes that I reviewed.       PHYSICAL EXAMINATION:  BP (!) 159/90   Pulse 73   Wt 97.5 kg (215 lb)   SpO2 97%   BMI 34.70 kg/m       PFT Latest Ref Rng & Units 10/29/2020   FVC L 2.95   FEV1 L 2.29   FVC% % 80   FEV1% % 81     MIP -83 cm H2O (normal).    He is awake, alert, oriented x3.  He is able to provide a coherent history.  He perhaps has mild aponeurotic ptosis bilaterally, but it does not become fatigable with sustained upward gaze.  There is no diplopia or ophthalmoparesis.  There is very mild weakness of orbicularis oculi but no weakness of orbicularis oris.  Cheek puff is equivocally weak.  Tongue does not show definite atrophy or fasciculations.  Lateral tongue movements are done very fast.  There is no jaw jerk.  There is no dysarthria of spastic or other quality, and no dysphonia.  Uvula and palate seem to be elevating normally in the midline.  Neck flexion and extension strength is full.      He has near complete paralysis of bilateral upper extremities.  Strength is as follows (MRC, right/left):  Deltoids 1/0, biceps 0/2-, triceps 2/3, wrist extensors 1/2.  Finger extensors 0/2, FDI 0/3, APB less than 3/4-; in the legs he has very mild weakness of right hip flexion at 4+ and right EHL at 4, but left hip flexion, bilateral knee extension, bilateral knee flexion, andbilateral foot dorsiflexion are 5.  Tone is normal in arms and legs.  There is no spasticity or rigidity.  He does have atrophy of muscles in bilateral arms diffusely, but I saw scant, if any, fasciculations  mostly in the biceps and  the forearm.  I did not see any fasciculations in the legs, and the bulk of his leg muscles is intact.      His reflexes are absent the upper extremities (biceps, triceps and brachioradialis), 2+ at the knees and absent at the ankles.  Toes are downgoing.  There is no Light response.  There is no pectoralis response.  Sensation is intact to all modalities in entire upper extremities including pinprick, light touch, vibration, position sensation.  Vibration is frankly intact at the toes too, over 9 seconds, and so is position sensation and pinprick.  He cannot do finger-to-nose due to weakness.  Gait was not assessed.      IMPRESSION:     1.  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    2.  Most likely incidental cervical spondylosis with myelopathy.      Mr. Dunphy represents a challenging diagnostic dilemma.  I spent about 60 minutes with him, of which more than half was counseling.  Those cases are among the hardest with deal with, and differentiating between a purely lower-motor-neuron variant of motor neuron disease and cervical spondylosis with myelopathy can be difficult.  However, there are several things that argue against the latter: 1) The complete lack of any sensory dysfunction of the upper extremities 2) the lack of clear long tract signs (he has no spasticity or hyperreflexia in the legs), 3) The EMG findings of low split hand index and denervation in thoracic paraspinals and lumbar muscles, which would not be expected with cervical polyradiculopathy or myelopathy.      I explained to him that flail arm syndrome (also known as brachial amyotrophic diplegia) is a variant of motor neuron disease and technically is not classified as ALS.  In order to make an ALS diagnosis, combined upper and lower motor neuron signs are required, and Mr Dunphy has no convincing upper motor neuron signs on his exam.  This condition has a much better  prognosis than ALS and a slower progression. There are individuals that can live 10+ years with little affliction of the leg, bulbar or respiratory muscles. However, it remains incurable to date.    His breathing function assessed by spirometry today was very satisfactory. He will be evaluated by Occupational Therapy.  He understands that if this is flail arm syndrome, there is not much that can be done to reverse his weakness and he is going to have to learn to live with this arm disability.      We discussed the prescription of riluzole.  Although there are not a lot of data supporting its efficacy for this particular variant of motor neuron disease, I think it is worth considering.  However, he reported a history of hepatitis B in the 1970s, that was never treated, and for that reason, I would like to get a hepatitis B virus load (DNA) with PCR.  If the values are very high, I am referring him to Hepatology and I will not give him riluzole.  If the values are low and his recent liver function tests were normal (AST, ALT were checked in 2020), I would consider riluzole with Hepatology clearance.  He is not a candidate for Radicava because he does not meet clinical criteria for a diagnosis of ALS.  He will be seen in followup in our clinic in 3 months or sooner if needed.        Sincerely,            MD JACKY Smith MD             D: 10/29/2020   T: 10/29/2020   MT: CLARE      Name:     DUNPHY, JOHN   MRN:      [id number]        Account:      [id number]   :      1950           Service Date: 10/29/2020      Document: [id number]          Again, thank you for allowing me to participate in the care of your patient.      Sincerely,    Jacky Ortiz MD

## 2020-10-30 LAB
HBV DNA SERPL NAA+PROBE-ACNC: NORMAL [IU]/ML
HBV DNA SERPL NAA+PROBE-LOG IU: NORMAL {LOG_IU}/ML

## 2021-01-28 ENCOUNTER — VIRTUAL VISIT (OUTPATIENT)
Dept: NEUROLOGY | Facility: CLINIC | Age: 71
End: 2021-01-28
Payer: COMMERCIAL

## 2021-01-28 ENCOUNTER — TELEPHONE (OUTPATIENT)
Dept: NEUROLOGY | Facility: CLINIC | Age: 71
End: 2021-01-28

## 2021-01-28 DIAGNOSIS — G12.20 MOTOR NEURON DISEASE (H): Primary | ICD-10-CM

## 2021-01-28 DIAGNOSIS — G95.89 MYELOMALACIA OF CERVICAL CORD (H): ICD-10-CM

## 2021-01-28 PROCEDURE — 99441 PR PHYSICIAN TELEPHONE EVALUATION 5-10 MIN: CPT | Mod: 95 | Performed by: PSYCHIATRY & NEUROLOGY

## 2021-01-28 RX ORDER — RILUZOLE 50 MG/1
50 TABLET, FILM COATED ORAL EVERY 12 HOURS
Qty: 60 TABLET | Refills: 3 | Status: SHIPPED | OUTPATIENT
Start: 2021-01-28 | End: 2021-07-30

## 2021-01-28 RX ORDER — SERTRALINE HYDROCHLORIDE 25 MG/1
TABLET, FILM COATED ORAL
COMMUNITY
Start: 2020-11-11

## 2021-01-28 RX ORDER — CHLORHEXIDINE GLUCONATE ORAL RINSE 1.2 MG/ML
SOLUTION DENTAL
COMMUNITY
Start: 2021-01-24

## 2021-01-28 RX ORDER — CIPROFLOXACIN HYDROCHLORIDE 3.5 MG/ML
SOLUTION/ DROPS TOPICAL
COMMUNITY
Start: 2021-01-20

## 2021-01-28 RX ORDER — PREDNISOLONE ACETATE 10 MG/ML
SUSPENSION/ DROPS OPHTHALMIC
COMMUNITY
Start: 2021-01-27

## 2021-01-28 RX ORDER — NYSTATIN 100000 [USP'U]/G
POWDER TOPICAL
COMMUNITY
Start: 2021-01-21

## 2021-01-28 NOTE — LETTER
1/28/2021       RE: John C Dunphy  [address] [facility] [address]     Dear Colleague,    Thank you for referring your patient, John C Dunphy, to the [facility]UROLOGY [facility] [place] at [facility]. Please see a copy of my visit note below.    Keyon is a 70 year old who is being evaluated via a billable telephone visit.      What phone number would you like to be contacted at? [phone]  How would you like to obtain your AVS? MAIL  Phone call duration: 10 minutes (10.29 am- 10.39 am)      Service Date: 01/28/2021      I had the pleasure to evaluate Mr. Dunphy via billable telephone visit today.  Mr. Dunphy currently resides at [facility].  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  He began developing weakness of his arms in 05/2018 with difficulty raising the right arm and this progressed to near complete paralysis of proximal more than distal upper extremity muscles bilaterally over 2 years.  An EMG I did on him in 09/2020 during his hospitalization showed denervation of the thoracic paraspinals, lower and upper extremity muscles without conduction block.  Those findings suggested a more widespread motor neuron disorder. He also had very significant cervical spondylosis with compression at multiple levels and myelomalacia at C4-5, but after review of the scans with Neurosurgery and some Neurology colleagues, we did not feel that the degree of arthritic changes seen was enough to explain his flail arm syndrome and recommended against operation.     Mr. Dunphy tells me that compared to 3 months ago, not a lot has changed.  His arms may be a little weaker, but he has not noticed any leg weakness.  He has difficulty getting up from the supine position to sitting in his bed. When standing, however, he can walk without significant  "difficulty.  There is no footdrop, tripping, difficulty turning, etc.  He does not report walking with a stooped posture.     He has mild dyspnea on exertion.  He attributes this to a \"double pneumonia\" he had before the COVID-19 pandemic and sedentary lifestyle.  He does not have any orthopnea.  However, PFTs done in 10/2020 were satisfactory with FVC 80%, FEV1 81% and MIP -83.        He denies any change in his voice or speech.  There is no drooling, difficulty chewing, or pseudobulbar affect.  He sometimes has to pay attention to his swallow; when he takes too big of a bite and very dry food, he can have problems.  However, if he cuts food into smaller pieces then there is no problem at all.  He denies difficulty swallowing pills or liquids.  He does not have any pain or cramps in the upper extremities.      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  I told him that this is a slowly progressive form of motor neuron disease.  The presence of subclinical denervation in thoracic paraspinals and leg muscles suggest that one day he is going to become weaker in those regions, but this could take years and his prognosis is much better than the average ALS case.   At this point, I do not have any additional treatments to propose except for riluzole.  He agreed to take it.  We will start 50 mg b.i.d. and check AST, ALT monthly for the first 3 months.  I will fax him a prescription with the blood test orders.        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  I told him that he should do some range of motion exercise with his arms, but trying to exercise with the purpose of building muscle or reversing the weakness is probably futile.      Thank you for allowing me to participate in the care of Mr. Dunphy.  Due to the slow progression of his syndrome, he will return to clinic in 6 " months or sooner if needed. TT spent on this encounter 20 minutes (10 on telephone call and 10 on post visit orders, note dictation, and editing).      JACKY WHYTE MD       cc:   Darwin Kelsey MD   [facility] and Surgery   [address] [place] [address]      Dane Resendez MD              D: 2021   T: 2021   MT: KAREEN      Name:     DUNPHY, JOHN   MRN:      [id number]        Account:      [id number]   :      1950           Service Date: 2021      Document: [id number]

## 2021-01-28 NOTE — PROGRESS NOTES
Keyon is a 70 year old who is being evaluated via a billable telephone visit.      What phone number would you like to be contacted at? [phone]  How would you like to obtain your AVS? MAIL  Phone call duration: 10 minutes (10.29 am- 10.39 am)

## 2021-01-28 NOTE — PROGRESS NOTES
"Service Date: 01/28/2021      I had the pleasure to evaluate Mr. Dunphy via billable telephone visit today.  Mr. Dunphy currently resides at [facility].  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  He began developing weakness of his arms in 05/2018 with difficulty raising the right arm and this progressed to near complete paralysis of proximal more than distal upper extremity muscles bilaterally over 2 years.  An EMG I did on him in 09/2020 during his hospitalization showed denervation of the thoracic paraspinals, lower and upper extremity muscles without conduction block.  Those findings suggested a more widespread motor neuron disorder. He also had very significant cervical spondylosis with compression at multiple levels and myelomalacia at C4-5, but after review of the scans with Neurosurgery and some Neurology colleagues, we did not feel that the degree of arthritic changes seen was enough to explain his flail arm syndrome and recommended against operation.     Mr. Dunphy tells me that compared to 3 months ago, not a lot has changed.  His arms may be a little weaker, but he has not noticed any leg weakness.  He has difficulty getting up from the supine position to sitting in his bed. When standing, however, he can walk without significant difficulty.  There is no footdrop, tripping, difficulty turning, etc.  He does not report walking with a stooped posture.     He has mild dyspnea on exertion.  He attributes this to a \"double pneumonia\" he had before the COVID-19 pandemic and sedentary lifestyle.  He does not have any orthopnea.  However, PFTs done in 10/2020 were satisfactory with FVC 80%, FEV1 81% and MIP -83.        He denies any change in his voice or speech.  There is no drooling, difficulty chewing, or pseudobulbar affect.  He sometimes has to pay attention to his swallow; when " he takes too big of a bite and very dry food, he can have problems.  However, if he cuts food into smaller pieces then there is no problem at all.  He denies difficulty swallowing pills or liquids.  He does not have any pain or cramps in the upper extremities.      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  I told him that this is a slowly progressive form of motor neuron disease.  The presence of subclinical denervation in thoracic paraspinals and leg muscles suggest that one day he is going to become weaker in those regions, but this could take years and his prognosis is much better than the average ALS case.   At this point, I do not have any additional treatments to propose except for riluzole.  He agreed to take it.  We will start 50 mg b.i.d. and check AST, ALT monthly for the first 3 months.  I will fax him a prescription with the blood test orders.        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  I told him that he should do some range of motion exercise with his arms, but trying to exercise with the purpose of building muscle or reversing the weakness is probably futile.      Thank you for allowing me to participate in the care of Mr. Dunphy.  Due to the slow progression of his syndrome, he will return to clinic in 6 months or sooner if needed. TT spent on this encounter 20 minutes (10 on telephone call and 10 on post visit orders, note dictation, and editing).      cc:   Darwin Kelsey MD   [facility]   [address] [place] [address]      MD JACKY Marie MD             D: 2021   T: 2021   MT: KAREEN      Name:     DUNPHY, JOHN   MRN:      [id number]        Account:      [id number]   :      1950           Service Date: 2021      Document: [id number]

## 2021-02-03 ENCOUNTER — TELEPHONE (OUTPATIENT)
Dept: OPHTHALMOLOGY | Facility: CLINIC | Age: 71
End: 2021-02-03

## 2021-02-04 NOTE — TELEPHONE ENCOUNTER
Patient's nurse called to say that patient had eye surgery today with Dr. Ray and he is having a lot of eye pain. I let the nurse know that Dr. Ray does not work at [facility] and to try to call his office instead. The nurse was directed here from JAQUELINE Chacon MD  Ophthalmology Resident, PGY-3

## 2021-07-09 ENCOUNTER — TELEPHONE (OUTPATIENT)
Dept: NEUROLOGY | Facility: CLINIC | Age: 71
End: 2021-07-09

## 2021-07-09 NOTE — TELEPHONE ENCOUNTER
[facility] Call Center    Phone Message    May a detailed message be left on voicemail: yes     Reason for Call: Other: Please call Raymond to let her know.  pt's family wants to know if pt needs to be seen again. What is needed to move pt's care forward. Thank you.      Action Taken: Message routed to:  [facility] ([facility]     Travel Screening: Not Applicable

## 2021-07-28 DIAGNOSIS — G12.20 MOTOR NEURON DISEASE (H): ICD-10-CM

## 2021-07-30 RX ORDER — RILUZOLE 50 MG/1
50 TABLET, FILM COATED ORAL EVERY 12 HOURS
Qty: 60 TABLET | Refills: 2 | Status: SHIPPED | OUTPATIENT
Start: 2021-07-30

## 2021-08-17 DIAGNOSIS — G12.21 ALS (AMYOTROPHIC LATERAL SCLEROSIS) (H): Primary | ICD-10-CM

## 2021-08-19 ENCOUNTER — OFFICE VISIT (OUTPATIENT)
Dept: NEUROLOGY | Facility: CLINIC | Age: 71
End: 2021-08-19
Payer: COMMERCIAL

## 2021-08-19 ENCOUNTER — TELEPHONE (OUTPATIENT)
Dept: NEUROLOGY | Facility: CLINIC | Age: 71
End: 2021-08-19

## 2021-08-19 VITALS
RESPIRATION RATE: 16 BRPM | SYSTOLIC BLOOD PRESSURE: 147 MMHG | HEART RATE: 74 BPM | DIASTOLIC BLOOD PRESSURE: 90 MMHG | OXYGEN SATURATION: 95 %

## 2021-08-19 DIAGNOSIS — G12.21 ALS (AMYOTROPHIC LATERAL SCLEROSIS) (H): ICD-10-CM

## 2021-08-19 DIAGNOSIS — G12.20 MOTOR NEURON DISEASE OR SYNDROME (H): Primary | ICD-10-CM

## 2021-08-19 PROCEDURE — 94375 RESPIRATORY FLOW VOLUME LOOP: CPT | Performed by: INTERNAL MEDICINE

## 2021-08-19 PROCEDURE — 94799 UNLISTED PULMONARY SVC/PX: CPT | Performed by: INTERNAL MEDICINE

## 2021-08-19 PROCEDURE — 99215 OFFICE O/P EST HI 40 MIN: CPT | Performed by: PSYCHIATRY & NEUROLOGY

## 2021-08-19 RX ORDER — CHOLECALCIFEROL (VITAMIN D3) 25 MCG
TABLET ORAL
COMMUNITY
Start: 2021-07-13

## 2021-08-19 ASSESSMENT — PATIENT HEALTH QUESTIONNAIRE - PHQ9: SUM OF ALL RESPONSES TO PHQ QUESTIONS 1-9: 11

## 2021-08-19 ASSESSMENT — PAIN SCALES - GENERAL: PAINLEVEL: NO PAIN (0)

## 2021-08-19 NOTE — LETTER
Date:November 5, 2021      Patient was self referred, no letter generated. Do not send.        [facility] Health Information

## 2021-08-19 NOTE — PROGRESS NOTES
"[facility]:  PHQ-9 Screening Note    SITUATION/BACKGROUND                                                    John C Dunphy is a 70 year old male who completed the PHQ-9 assessment for depression and Score is >9.    Onset of symptoms: gradual  Trigger: disease  Recent related events:   Prior history of suicide attempt or self harm: No   Risk Factors: comorbid medical condition of ALS  History of depression or mental illness: Yes  Medications reviewed: NA     ASSESSMENT      A. Are any of the following present?      Suicidal thoughts with a plan and means to carry out the plan?    Intent to harm others    Altered mental status: confusion, delusional, psychotic YES  - Patient should be seen in the ED.  If patient is willing to go to the ED, call [facility] Non Emergent Transportation at [phone].  If patient is unwilling to go to the ED, call 911.   Clinic staff to fill out the  Transportation Hold  form.    Place order for referral to behavioral health team for  regular  follow-up.    NO - go to B   B. Are any of the following present?      Suicidal thoughts without a plan or means to carry out the plan    New onset of delusional ideas    Past inpatient admission for depression    New onset and recent change or addition of new medication YES  - Patient should receive crises care within 2-4 hours. Offer emergency room care or connect with any of the *crisis resources.     Place referral to behavioral health team for \"regular\" follow-up.    NO - go to C   C. Are any of the following present?      Previous suicide attempts    Depression interfering with ability to work or function    Loss of appetite and eating poorly    Abrupt cessation of drugs (OTC or RX), alcohol or caffeine    Drug or alcohol abuse YES -  [facility] behavior health team. If no response, patient should receive crisis care within 24 hours.     Place referral to behavioral health team for \"regular\" follow-up.     NO - go to [facility]   D. Are several of " "the following present?      Difficulty concentrating    Difficulty sleeping    Reduced interest in sexual activity or impotency    Irregular or absent menstruation    No interest in activity    Change in interpersonal relationships    Increased use/abuse of alcohol or drugs    Pregnant or recent child birth    Recent major life change    History of depression YES -  Follow-up with PCP for appointment and follow home care instructions.    Place referral to behavioral health team for \"regular\" follow-up.    NO - provide home care instructions.        PLAN      Home Care Instructions:   Contact friends or family for support    Report the following to your PCP:       Seek emergency care immediately if any of the following occur:   Suicidal thoughts and plan and means to carry out the plan    BEHAVIORAL HEALTH TEAMS      [facility] - Behavioral Health Team    [facility] Pager: [phone]    [place]  - Behavioral Health Team    Pager number: [phone]    Referral to Behavioral Health    BEHAVIORAL / SPIRITUAL HEALTH [facility] [89046}    RESOURCES      - currently in nursing home, staff aware of suicidal thoughts    Mayra Hernandez RN        Copyright 2016 [employer]      "

## 2021-08-19 NOTE — PROGRESS NOTES
Depression Response    Patient completed the PHQ-9 assessment for depression and scored >9? Yes  Question 9 on the PHQ-9 was positive for suicidality? Yes  Does patient have current mental health provider? Yes    Is this a virtual visit? No    I personally notified the following: visit provider     Angelica Pro

## 2021-08-19 NOTE — PROGRESS NOTES
Service Date: 08/19/2021    PRIMARY CARE PROVIDER:  Not listed.    I had the pleasure to evaluate Mr. Dunphy at the [facility] today.  Mr. Dunphy is a 70-year-old man with flail arm syndrome/brachial amyotrophic diplegia who began developing arm weakness in 05/2018.  He has near complete paralysis of the arms.  I last saw him virtually about 7 months ago in 01/2021.  Compared to that time, he tells me that he has noticed head drop.  His neck muscles are not so strong.  The head tends to lean forward during the day. He has also noticed mild dysphagia.  Food occasionally will get stuck in his throat and he has to chew carefully and cut it into smaller pieces.  He can flush it with liquids.  He has rare choking episodes.  He does not have major liquid dysphagia.  He denies sialorrhea or pseudobulbar affect.  He does not have striking dysphonia.  He has not noticed any more weakness in his legs compared to 01/2021.  He generally can get up from a chair and walk unassisted without problems.  No falls reported.  He does report some twitching in his leg muscles, particularly the left thigh that happens from time to time.  He does not have dyspnea on exertion when walking and he denies orthopnea.  He sleeps with 2 pillows at night.  He denies nonrefreshing sleep or prominent morning headaches.  He has some trouble getting asleep, which takes him up to 2 hours and wakes up one time at night to urinate.  He also uses CPAP at night.  He is noticing increasing hand weakness from the last visit with clawing especially of the left hand.  He has therapies available at his nursing home.  He is on riluzole 50 mg b.i.d., tolerating it well.  Last LFTs were checked in January, February and March 2021 and they were consistently within normal limits on all 3 occasions.      Pulmonary function tests today showed a forced vital capacity of 74%, FEV1 of 77% and MIP of -71 cm  water.  It was slightly declined from 10/2020, but not a huge change.    BP (!) 147/90   Pulse 74   Resp 16   SpO2 95%     On exam today, Mr. Dunphy appears in good spirits.  He has no ptosis or ophthalmoparesis.  He has mild weakness of bilateral orbicularis oculi, but no weakness of orbicularis oris.  Cheek puff is satisfactory.  Tongue shows no atrophy or fasciculations.  He has mild weakness of genioglossus in both directions.  Uvula and palate elevate normally.  There is no dysphonia or dysarthria.  Neck flexion is about a 4/5, extension is quite weak, 3/5 or less and that is a new finding from the previous visit.  He has near complete plegia of bilateral upper extremities.  On the lower extremities, he continues with mild right hip flexion weakness at 4/5, left hip flexion is 5. Knee extension and knee flexion are full.  Foot dorsiflexion is right 4, left 5-.  Overall, there is little change in his leg strength from the last visit.  His reflexes are absent at the ankles, 1+ at the right knee, perhaps 1-2+ at the left knee and absent at the upper extremities.  He has no upper motor neuron signs whatsoever.  His tone is normal.  There is no Babinski.    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  He does not have any upper motor neuron signs, I would not call him ALS.  He understands that this is an incurable syndrome with very slow progression over time.  At this point, I would recommend occupational therapy to evaluate him at the nursing home for the possibility of using a resting hand splint to relieve his claw contraction of the left hand.  He should also be evaluated by speech therapy given his complaint of dysphagia with solids.  I would recommend that he is also seen by Orthotics and he uses an Aspen head collar for  his head drop.  This indicates weakness of the neck extensors and rostral progression of his motor neuron disease.  His pulmonary function tests have slightly declined from the last visit, but they are not concerning at this point.  I am not going to take any further action on it.  He will continue riluzole 50 mg b.i.d.  I am not rechecking liver function tests today unless he is becoming symptomatic.    We discussed Mr. Dunphy's mood at some length today.  He scored a high PHQ-9 (11) and he tells me that he thinks of hurting himself a few days a week.  However, he does not have any concrete plans on this.  He tells me that if he gets disabled enough to the point that he cannot serve himself or walk and he needs care for his hygiene or he cannot eat food he could consider ending his life.  I do not think he is an immediate threat to himself or others right now and he was evaluated by our nurse for the same reason today, who concurs with this assessment.  He does have close followup with Psychiatry at his nursing home every 2 weeks and he has constant nursing supervision.  He understands the above.  We will discuss goals of care again during upcoming visits.  I will see him in followup in 4 months or sooner if needed.    Total time spent in this encounter today 40 minutes including 25 face-to-face with the patient, 10 on post-visit note dictation, editing and orders, and 5 in pre-visit chart review.    Taras Ortiz MD        D: 2021   T: 2021   MT: DELONTE    Name:     DUNPHY, JOHN C.  MRN:      [id number]        Account:      [id number]   :      1950           Service Date: 2021       Document: [id number]

## 2021-08-19 NOTE — LETTER
8/19/2021       RE: John C Dunphy  [address]  [facility] [address]     Dear Colleague,    Thank you for referring your patient, John C Dunphy, to the [facility] [place] at [facility]. Please see a copy of my visit note below.    Depression Response    Patient completed the PHQ-9 assessment for depression and scored >9? Yes  Question 9 on the PHQ-9 was positive for suicidality? Yes  Does patient have current mental health provider? Yes    Is this a virtual visit? No    I personally notified the following: visit provider     Angelica Pro                 [facility]:  PHQ-9 Screening Note    SITUATION/BACKGROUND                                                    John C Dunphy is a 70 year old male who completed the PHQ-9 assessment for depression and Score is >9.    Onset of symptoms: gradual  Trigger: disease  Recent related events:   Prior history of suicide attempt or self harm: No   Risk Factors: comorbid medical condition of ALS  History of depression or mental illness: Yes  Medications reviewed: NA     ASSESSMENT      A. Are any of the following present?      Suicidal thoughts with a plan and means to carry out the plan?    Intent to harm others    Altered mental status: confusion, delusional, psychotic YES  - Patient should be seen in the ED.  If patient is willing to go to the ED, call [facility] Non Emergent Transportation at [phone].  If patient is unwilling to go to the ED, call 911.   Clinic staff to fill out the  Transportation Hold  form.    Place order for referral to behavioral health team for  regular  follow-up.    NO - go to B   B. Are any of the following present?      Suicidal thoughts without a plan or means to carry out the plan    New onset of delusional ideas    Past inpatient admission for depression    New onset and recent change or addition of new medication YES  - Patient should receive crises  "care within 2-4 hours. Offer emergency room care or connect with any of the *crisis resources.     Place referral to behavioral health team for \"regular\" follow-up.    NO - go to C   C. Are any of the following present?      Previous suicide attempts    Depression interfering with ability to work or function    Loss of appetite and eating poorly    Abrupt cessation of drugs (OTC or RX), alcohol or caffeine    Drug or alcohol abuse YES -  [facility] behavior health team. If no response, patient should receive crisis care within 24 hours.     Place referral to behavioral health team for \"regular\" follow-up.     NO - go to D   D. Are several of the following present?      Difficulty concentrating    Difficulty sleeping    Reduced interest in sexual activity or impotency    Irregular or absent menstruation    No interest in activity    Change in interpersonal relationships    Increased use/abuse of alcohol or drugs    Pregnant or recent child birth    Recent major life change    History of depression YES -  Follow-up with PCP for appointment and follow home care instructions.    Place referral to behavioral health team for \"regular\" follow-up.    NO - provide home care instructions.        PLAN      Home Care Instructions:   Contact friends or family for support    Report the following to your PCP:       Seek emergency care immediately if any of the following occur:   Suicidal thoughts and plan and means to carry out the plan    BEHAVIORAL HEALTH TEAMS      [facility] - Behavioral Health Team    [facility] Pager: [phone]    [place]  - Behavioral Health Team    Pager number: [phone]    Referral to Behavioral Health    [facility]AV[facility]UAL [facility]ALTH [facility] [54058}    RESOURCES      - currently in nursing home, staff aware of suicidal thoughts    Mayra Hernandez RN        Copyright 2016 [employer]        Service Date: 08/19/2021    PRIMARY CARE PROVIDER:  Not listed.    I had the pleasure to evaluate Mr. Dunphy at the " [facility] today.  Mr. Dunphy is a 70-year-old man with flail arm syndrome/brachial amyotrophic diplegia who began developing arm weakness in 05/2018.  He has near complete paralysis of the arms.  I last saw him virtually about 7 months ago in 01/2021.  Compared to that time, he tells me that he has noticed head drop.  His neck muscles are not so strong.  The head tends to lean forward during the day. He has also noticed mild dysphagia.  Food occasionally will get stuck in his throat and he has to chew carefully and cut it into smaller pieces.  He can flush it with liquids.  He has rare choking episodes.  He does not have major liquid dysphagia.  He denies sialorrhea or pseudobulbar affect.  He does not have striking dysphonia.  He has not noticed any more weakness in his legs compared to 01/2021.  He generally can get up from a chair and walk unassisted without problems.  No falls reported.  He does report some twitching in his leg muscles, particularly the left thigh that happens from time to time.  He does not have dyspnea on exertion when walking and he denies orthopnea.  He sleeps with 2 pillows at night.  He denies nonrefreshing sleep or prominent morning headaches.  He has some trouble getting asleep, which takes him up to 2 hours and wakes up one time at night to urinate.  He also uses CPAP at night.  He is noticing increasing hand weakness from the last visit with clawing especially of the left hand.  He has therapies available at his nursing home.  He is on riluzole 50 mg b.i.d., tolerating it well.  Last LFTs were checked in January, February and March 2021 and they were consistently within normal limits on all 3 occasions.      Pulmonary function tests today showed a forced vital capacity of 74%, FEV1 of 77% and MIP of -71 cm water.  It was slightly declined from 10/2020, but not a huge change.    BP (!) 147/90   Pulse 74   Resp 16    SpO2 95%     On exam today, Mr. Dunphy appears in good spirits.  He has no ptosis or ophthalmoparesis.  He has mild weakness of bilateral orbicularis oculi, but no weakness of orbicularis oris.  Cheek puff is satisfactory.  Tongue shows no atrophy or fasciculations.  He has mild weakness of genioglossus in both directions.  Uvula and palate elevate normally.  There is no dysphonia or dysarthria.  Neck flexion is about a 4/5, extension is quite weak, 3/5 or less and that is a new finding from the previous visit.  He has near complete plegia of bilateral upper extremities.  On the lower extremities, he continues with mild right hip flexion weakness at 4/5, left hip flexion is 5. Knee extension and knee flexion are full.  Foot dorsiflexion is right 4, left 5-.  Overall, there is little change in his leg strength from the last visit.  His reflexes are absent at the ankles, 1+ at the right knee, perhaps 1-2+ at the left knee and absent at the upper extremities.  He has no upper motor neuron signs whatsoever.  His tone is normal.  There is no Babinski.    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  He does not have any upper motor neuron signs, I would not call him ALS.  He understands that this is an incurable syndrome with very slow progression over time.  At this point, I would recommend occupational therapy to evaluate him at the nursing home for the possibility of using a resting hand splint to relieve his claw contraction of the left hand.  He should also be evaluated by speech therapy given his complaint of dysphagia with solids.  I would recommend that he is also seen by Orthotics and he uses an Aspen head collar for his head drop.  This indicates weakness of the neck extensors and rostral progression of his motor neuron  disease.  His pulmonary function tests have slightly declined from the last visit, but they are not concerning at this point.  I am not going to take any further action on it.  He will continue riluzole 50 mg b.i.d.  I am not rechecking liver function tests today unless he is becoming symptomatic.    We discussed Mr. Dunphy's mood at some length today.  He scored a high PHQ-9 (11) and he tells me that he thinks of hurting himself a few days a week.  However, he does not have any concrete plans on this.  He tells me that if he gets disabled enough to the point that he cannot serve himself or walk and he needs care for his hygiene or he cannot eat food he could consider ending his life.  I do not think he is an immediate threat to himself or others right now and he was evaluated by our nurse for the same reason today, who concurs with this assessment.  He does have close followup with Psychiatry at his nursing home every 2 weeks and he has constant nursing supervision.  He understands the above.  We will discuss goals of care again during upcoming visits.  I will see him in followup in 4 months or sooner if needed.    Total time spent in this encounter today 40 minutes including 25 face-to-face with the patient, 10 on post-visit note dictation, editing and orders, and 5 in pre-visit chart review.    Taras Ortiz MD        D: 2021   T: 2021   MT: DELONTE    Name:     DUNPHY, JOHN C.  MRN:      [id number]        Account:      [id number]   :      1950           Service Date: 2021       Document: [id number]        Again, thank you for allowing me to participate in the care of your patient.      Sincerely,    Taras Ortiz MD

## 2021-08-19 NOTE — TELEPHONE ENCOUNTER
[facility] Call Center    Phone Message    May a detailed message be left on voicemail: yes     Reason for Call: 4 orders given to patient today, 2 orders need clarification.  Please reach out to Raymond or a nurse under patients care.    Action Taken: Message routed to:  [facility] ([facility] Neurology    Travel Screening: Not Applicable

## 2021-08-23 DIAGNOSIS — G12.21 ALS (AMYOTROPHIC LATERAL SCLEROSIS) (H): ICD-10-CM

## 2021-08-23 DIAGNOSIS — R29.898 DROPPED HEAD SYNDROME: Primary | ICD-10-CM

## 2021-08-23 LAB
EXPTIME-PRE: 17.04 SEC
FEF2575-%PRED-PRE: 102 %
FEF2575-PRE: 2.24 L/SEC
FEF2575-PRED: 2.18 L/SEC
FEFMAX-%PRED-PRE: 65 %
FEFMAX-PRE: 4.82 L/SEC
FEFMAX-PRED: 7.38 L/SEC
FEV1-%PRED-PRE: 77 %
FEV1-PRE: 2.17 L
FEV1FEV6-PRE: 81 %
FEV1FEV6-PRED: 78 %
FEV1FVC-PRE: 80 %
FEV1FVC-PRED: 77 %
FIFMAX-PRE: 2.29 L/SEC
FVC-%PRED-PRE: 74 %
FVC-PRE: 2.71 L
FVC-PRED: 3.65 L
MEP-PRE: 119 CMH2O
MIP-PRE: -71 CMH2O

## 2021-08-23 PROCEDURE — 99207 PR MISC DURABLE MEDICAL EQUIP: CPT

## 2021-10-27 NOTE — NURSING NOTE
Chief Complaint   Patient presents with     Consult For     P NEW - ALS/MOTOR NEURON     Pawel Johnson    
PAST MEDICAL HISTORY:  No pertinent past medical history

## 2021-12-08 DIAGNOSIS — G12.21 ALS (AMYOTROPHIC LATERAL SCLEROSIS) (H): Primary | ICD-10-CM

## 2021-12-09 ENCOUNTER — THERAPY VISIT (OUTPATIENT)
Dept: PHYSICAL THERAPY | Facility: CLINIC | Age: 71
End: 2021-12-09
Payer: COMMERCIAL

## 2021-12-09 ENCOUNTER — OFFICE VISIT (OUTPATIENT)
Dept: NEUROLOGY | Facility: CLINIC | Age: 71
End: 2021-12-09
Payer: COMMERCIAL

## 2021-12-09 VITALS
BODY MASS INDEX: 32.44 KG/M2 | SYSTOLIC BLOOD PRESSURE: 129 MMHG | RESPIRATION RATE: 16 BRPM | DIASTOLIC BLOOD PRESSURE: 75 MMHG | WEIGHT: 201 LBS | HEART RATE: 63 BPM | OXYGEN SATURATION: 95 %

## 2021-12-09 DIAGNOSIS — G12.21 ALS (AMYOTROPHIC LATERAL SCLEROSIS) (H): Primary | ICD-10-CM

## 2021-12-09 DIAGNOSIS — G12.21 BRACHIAL AMYOTROPHIC DIPLEGIA (H): Primary | ICD-10-CM

## 2021-12-09 DIAGNOSIS — G12.21 ALS (AMYOTROPHIC LATERAL SCLEROSIS) (H): ICD-10-CM

## 2021-12-09 PROBLEM — R29.898 DEFICIENCIES OF LIMBS: Status: ACTIVE | Noted: 2021-01-07

## 2021-12-09 PROBLEM — G95.89: Status: ACTIVE | Noted: 2021-01-07

## 2021-12-09 PROBLEM — M62.81 MUSCLE WEAKNESS (GENERALIZED): Status: ACTIVE | Noted: 2021-01-07

## 2021-12-09 PROBLEM — E87.6 HYPOPOTASSEMIA: Status: ACTIVE | Noted: 2021-01-07

## 2021-12-09 PROBLEM — F43.22 ADJUSTMENT DISORDER WITH ANXIETY: Status: ACTIVE | Noted: 2021-01-07

## 2021-12-09 PROBLEM — H26.9 ANTERIOR SUBCAPSULAR POLAR CATARACT, NONSENILE: Status: ACTIVE | Noted: 2021-01-07

## 2021-12-09 PROBLEM — F33.1 MAJOR DEPRESSIVE DISORDER, RECURRENT EPISODE, MODERATE (H): Status: ACTIVE | Noted: 2021-01-07

## 2021-12-09 LAB
EXPTIME-PRE: 7.11 SEC
FEF2575-%PRED-PRE: 83 %
FEF2575-PRE: 1.79 L/SEC
FEF2575-PRED: 2.14 L/SEC
FEFMAX-%PRED-PRE: 64 %
FEFMAX-PRE: 4.72 L/SEC
FEFMAX-PRED: 7.28 L/SEC
FEV1-%PRED-PRE: 71 %
FEV1-PRE: 1.97 L
FEV1FEV6-PRE: 79 %
FEV1FEV6-PRED: 77 %
FEV1FVC-PRE: 79 %
FEV1FVC-PRED: 76 %
FIFMAX-PRE: 3.67 L/SEC
FVC-%PRED-PRE: 69 %
FVC-PRE: 2.51 L
FVC-PRED: 3.62 L
MEP-PRE: 119 CMH2O
MIP-PRE: -70 CMH2O

## 2021-12-09 PROCEDURE — 97535 SELF CARE MNGMENT TRAINING: CPT | Mod: GP | Performed by: PHYSICAL THERAPIST

## 2021-12-09 PROCEDURE — 97162 PT EVAL MOD COMPLEX 30 MIN: CPT | Mod: GP | Performed by: PHYSICAL THERAPIST

## 2021-12-09 PROCEDURE — 94375 RESPIRATORY FLOW VOLUME LOOP: CPT | Performed by: INTERNAL MEDICINE

## 2021-12-09 PROCEDURE — 99214 OFFICE O/P EST MOD 30 MIN: CPT | Performed by: PSYCHIATRY & NEUROLOGY

## 2021-12-09 PROCEDURE — 94799 UNLISTED PULMONARY SVC/PX: CPT | Performed by: INTERNAL MEDICINE

## 2021-12-09 RX ORDER — CHLORHEXIDINE GLUCONATE ORAL RINSE 1.2 MG/ML
30 SOLUTION DENTAL
COMMUNITY

## 2021-12-09 ASSESSMENT — REVISED AMYOTROPHIC LATERAL SCLEROSIS FUNCTIONAL RATING SCALE (ALSFRS-R)
BULBAR_SUBTOTAL: 10
DRESSING_AND_HYGEINE: TOTAL DEPENDENCE
WALKING: 3
CUTTING_FOOD_AND_HANDLING_UTENSILES: 0
WALKING: EARLY AMBULATION DIFFICULTIES
SWALLOWING: DIETARY CONSISTENCY CHANGES
SPEECH: NORMAL SPEECH PROCESSES
SWALLOWING: 2
DYSPNEA: 4
SALIVATION: NORMAL
FINE_MOTOR_SUBTOTAL_SCORE: 0
TURNING_IN_BED_AND_ADJUSTING_BED_CLOTHES: 2
CLIMBING_STAIRS: 0
SPEECH: 4
RESPIRATORY_SUBTOTAL_SCORE: 12
SIX_ITEM_SUBTOTAL: 13
GROSS_MOTOR_SUBTOTAL_SCORE: 5
ALSFRS_TOTAL_SCORE: 27
DRESSING_AND_HYGEINE: 0
ORTHOPENA: 4
TURNING_IN_BED_AND_ADJUSTING_BED_CLOTHES: CAN TURN ALONE OR ADJUST SHEETS, BUT WITH GREAT DIFFICULTY
SALIVATION: 4
HANDWRITING: 0
HANDWRITING: UNABLE TO GRIP PEN
RESPIRATORY_INSUFFICIENCY: 4
CLIMBING_STAIRS: CANNOT DO

## 2021-12-09 NOTE — DISCHARGE INSTRUCTIONS
1. I'll ask Dr. FRYE to put in another orthotics referral. You would go as an outpatient to get fitted/sized appropriately  2. For now, use the collar anytime you are up walking. Make sure the pads are touching or overlapping (front over back) to get the snuggest fit. Take off when sitting, eating, etc  3. Recommend high back chair/wheelchair with head support to use at all times when sitting in order to rest neck muscles    Rissa Espana PT, DPT  Physical Therapist  [facility] [address]  [address] [place] [address]  [email or web address]  Appointments: [phone]

## 2021-12-09 NOTE — LETTER
12/9/2021       RE: John C Dunphy  [address]  [facility] [address]     Dear Colleague,    Thank you for referring your patient, John C Dunphy, to the [facility] NEUROLOG[facility] [place] at [facility]. Please see a copy of my visit note below.    Service Date: 12/09/2021    I had the pleasure to see Mr. Dunphy in followup at the [facility].  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  During his last visit, I noted that he had mild leg weakness too, including hip flexion, foot dorsiflexion, and he was also complaining of head drop and some dysphagia.  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 Compared to the last visit, he has noticed further weakening of his left arm muscles.  It is very difficult for him to turn in bed now because he cannot use his arms and he does not trust his left hand to  or do fine movements anymore as objects drop.  This also gives him a lot of difficulties in the restroom because he cannot hold his genital to urinate.  He has ended up wetting his pants a few times.  He denies significant cramps or fasciculations.  Regarding leg weakness, he does not notice any major changes from the last visit.  He can get up from a chair, but he does not ascend stairs just because he cannot use a handrail.  He has not taken any falls fortunately and denies foot tripping.    He continues to have dysphagia.  He has been assessed by occupational therapy at the nursing home and also speech therapy who did a videofluoroscopic study.  They instructed him to swallow during exhalation phase, cut food in smaller pieces, use chin tuck maneuvers, etc.  He had 1 choking incident 6 months ago, but after speech therapy has evaluated him and he followed their  instructions, the choking has been extremely rare.  His voice appears a little deeper and more liquidy at times.  He does not have pseudobulbar affect or sialorrhea.  He coughs excessive phlegm lately.  For the head drop, he is using a head collar, but it is not tight enough and he believes it requires adjustments.  He is not very satisfied with it.    He is on riluzole 50 mg b.i.d., tolerating it well.  Last liver function tests in 03/2021 were normal.  He does not have dyspnea on exertion or orthopnea.  He sleeps with 2 pillows at night, mostly for reasons of neck comfort.     He tells me he does not have any new thoughts of hurting himself, but he still has the same plan to consider ending his life when he develops a degree of disability that is such that he cannot walk or swallow anymore.  He was clear that he has an advanced directive and he does not want resuscitation or intubation. He also declined gastrostomy tube.  He has regular followup with Psychiatry.      Medications were reviewed and are as per Epic record.    PFT Latest Ref Rng & Units 12/9/2021   FVC L 2.51   FEV1 L 1.97   FVC% % 69   FEV1% % 71     /75   Pulse 63   Resp 16   Wt 91.2 kg (201 lb)   SpO2 95%   BMI 32.44 kg/m      On focused examination today, he has bilateral hip flexion weakness, 4/5 on the right and 4+ on the left.  Knee extension, knee flexion bilaterally 5.  Foot dorsiflexion is 4 on the right, 4+ on the left. Arms are nearly plegic.  His neck flexion is 4-/5, extension is 3 or less.  He has mild weakness of bilateral orbicularis oculi. Orbicularis oris strength is probably normal, although cheek puff is minimally weak.  Tongue does not show atrophy or fasciculations and lateral movements are fairly rapid.  Genioglossus strength is probably satisfactory or equivocally weak at most.  I did not appreciate any definite dysphonia or dysarthria today.    In summary, Mr. Dunphy had initially brachial amyotrophic diplegia but  now he has signs of lower motor neuron involvement in the lumbar and bulbar regions to some degree as well.  We discussed a number of practical issues.  He should continue evaluations by occupational therapy and speech therapy at his nursing home.  I would also recommend additional physical therapy evaluation for balance as there is a mild degree of leg weakness.  His pulmonary function test values today are slightly lower than the previous visit but still not concerning and I am not going to start noninvasive ventilation at night.  He does not have orthopnea.  Some of his restrictive respiratory dysfunction could be sequela of a previous severe influenza pneumonia that he had in 7028-2224.      He also would like to see genetics.  I discussed with him the small, about 5%, chance of a positive genetic test in an otherwise sporadic ALS.  This may have treatment implications if he tests positive for SOD1 mutation as there is antisense oligonucleotide therapy in advanced phase III trial and open access may become available soon. He agrees to the test.    He will continue riluzole.  I am not checking liver function tests unless he is symptomatic in that regard.  He will follow up in 6 months or sooner if needed.      Total time spent on this encounter today was 35 minutes including 20 face-to-face with the patient, 10 on post-visit note dictation, editing and orders, and 5 in pre-visit chart review.    Taras Ortiz MD        D: 2021   T: 2021   MT: [place]    Name:     DUNPHY, JOHN C.  MRN:      [id number]        Account:      [id number]   :      1950           Service Date: 2021       Document: [id number]        Again, thank you for allowing me to participate in the care of your patient.      Sincerely,    Taras Ortiz MD

## 2021-12-09 NOTE — PROGRESS NOTES
Service Date: 12/09/2021    I had the pleasure to see Mr. Dunphy in followup at the [facility].  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  During his last visit, I noted that he had mild leg weakness too, including hip flexion, foot dorsiflexion, and he was also complaining of head drop and some dysphagia.  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 Compared to the last visit, he has noticed further weakening of his left arm muscles.  It is very difficult for him to turn in bed now because he cannot use his arms and he does not trust his left hand to  or do fine movements anymore as objects drop.  This also gives him a lot of difficulties in the restroom because he cannot hold his genital to urinate.  He has ended up wetting his pants a few times.  He denies significant cramps or fasciculations.  Regarding leg weakness, he does not notice any major changes from the last visit.  He can get up from a chair, but he does not ascend stairs just because he cannot use a handrail.  He has not taken any falls fortunately and denies foot tripping.    He continues to have dysphagia.  He has been assessed by occupational therapy at the nursing home and also speech therapy who did a videofluoroscopic study.  They instructed him to swallow during exhalation phase, cut food in smaller pieces, use chin tuck maneuvers, etc.  He had 1 choking incident 6 months ago, but after speech therapy has evaluated him and he followed their instructions, the choking has been extremely rare.  His voice appears a little deeper and more liquidy at times.  He does not have pseudobulbar affect or sialorrhea.  He coughs excessive phlegm lately.  For the head drop, he is using a head collar, but it is not tight enough and he believes it requires  adjustments.  He is not very satisfied with it.    He is on riluzole 50 mg b.i.d., tolerating it well.  Last liver function tests in 03/2021 were normal.  He does not have dyspnea on exertion or orthopnea.  He sleeps with 2 pillows at night, mostly for reasons of neck comfort.     He tells me he does not have any new thoughts of hurting himself, but he still has the same plan to consider ending his life when he develops a degree of disability that is such that he cannot walk or swallow anymore.  He was clear that he has an advanced directive and he does not want resuscitation or intubation. He also declined gastrostomy tube.  He has regular followup with Psychiatry.      Medications were reviewed and are as per Epic record.    PFT Latest Ref Rng & Units 12/9/2021   FVC L 2.51   FEV1 L 1.97   FVC% % 69   FEV1% % 71     /75   Pulse 63   Resp 16   Wt 91.2 kg (201 lb)   SpO2 95%   BMI 32.44 kg/m      On focused examination today, he has bilateral hip flexion weakness, 4/5 on the right and 4+ on the left.  Knee extension, knee flexion bilaterally 5.  Foot dorsiflexion is 4 on the right, 4+ on the left. Arms are nearly plegic.  His neck flexion is 4-/5, extension is 3 or less.  He has mild weakness of bilateral orbicularis oculi. Orbicularis oris strength is probably normal, although cheek puff is minimally weak.  Tongue does not show atrophy or fasciculations and lateral movements are fairly rapid.  Genioglossus strength is probably satisfactory or equivocally weak at most.  I did not appreciate any definite dysphonia or dysarthria today.    In summary, Mr. Dunphy had initially brachial amyotrophic diplegia but now he has signs of lower motor neuron involvement in the lumbar and bulbar regions to some degree as well.  We discussed a number of practical issues.  He should continue evaluations by occupational therapy and speech therapy at his nursing home.  I would also recommend additional physical therapy  evaluation for balance as there is a mild degree of leg weakness.  His pulmonary function test values today are slightly lower than the previous visit but still not concerning and I am not going to start noninvasive ventilation at night.  He does not have orthopnea.  Some of his restrictive respiratory dysfunction could be sequela of a previous severe influenza pneumonia that he had in 2224-4018.      He also would like to see genetics.  I discussed with him the small, about 5%, chance of a positive genetic test in an otherwise sporadic ALS.  This may have treatment implications if he tests positive for SOD1 mutation as there is antisense oligonucleotide therapy in advanced phase III trial and open access may become available soon. He agrees to the test.    He will continue riluzole.  I am not checking liver function tests unless he is symptomatic in that regard.  He will follow up in 6 months or sooner if needed.      Total time spent on this encounter today was 35 minutes including 20 face-to-face with the patient, 10 on post-visit note dictation, editing and orders, and 5 in pre-visit chart review.    Taras Ortiz MD        D: 2021   T: 2021   MT: [place]    Name:     DUNPHY, JOHN C.  MRN:      [id number]        Account:      [id number]   :      1950           Service Date: 2021       Document: [id number]

## 2021-12-09 NOTE — PROGRESS NOTES
"[facility]    OUTPATIENT PHYSICAL THERAPY CLINIC NOTE  Dunphy,John C  YOB: 1950  [id number]    Type of visit:         Evaluation     Date of service: 12/9/2021    Referring provider: Taras Ortiz MD     present: No    Others present at visit:   at [facility] diagnosis:   Amyotrophic lateral sclerosis (ALS) -  flail arm syndrome/brachial amyotrophic diplegia     Date of diagnosis: 10/29/2020    Pertinent history of current problem (include personal factors and/or comorbidities that impact the plan of care): Symptom onset as bilateral arm weakness began in May 2018 which has now progressed to near paralysis of BUE (has some distal control). Patient uses CPAP at night. Began on Riluzole.    Cardio-respiratory status:  Forced vital capacity: 69 % of predicted     Height/Weight: 5'6\" / 201lbs    [facility]    Living environment barriers:  None     Current assistance/living environment:  Requires partial assistance      Current mobility equipment:  Aspen collar  *Recommended soft collar (size: medium long), bed cradle     Current ADL equipment:  None    Technology used: N/A    Patient concerns/goals: Aspen collar not fitting correctly- chin slides down. Difficulty rolling over in bed over past couple weeks- arms get stuck in covers    Evaluation   Interview completed.   Pain assessment:  Pain denied     Range of motion: WFL     Manual muscle testing:  Cervical weakness- especially neck extensors resulting in head drop. Patient able to stand from chair without UE support. Paralysis of UEs, except distally in fingers   Gait:  Patient ambulates with Aspen collar modified independently   Cognition:  Intact    Fall Risk Screen:   Has the patient fallen 2 or more times in the last year? No      Has the patient fallen and had an " injury in the past year? No       Timed Up and Go Score: <13.5 seconds    Is the patient a fall risk? Yes, department fall risk interventions implemented     Impairments:  Fatigue  Muscle atrophy  Coordination     Treatment diagnosis:  Impaired mobility  Impaired activities of daily living    Clinical Presentation: Evolving/Changing  Clinical Presentation Rationale: personal factors, body systems involved, progressive nature of disease  Clinical Decision Making (Complexity): Moderate complexity     Recommendations/Plan of care:  1 session evaluation & treatment.  Recommend referral to OP PT at facility.  Equipment recommended: soft collar- size medium long. Bed cradle- requested from [employer].     Goals:   Target date: 12/9/2021  Patient, family and/or caregiver will verbalize understanding of evaluation results and implications for functional performance.  Patient, family and/or caregiver will verbalize/demonstrate understanding of compensatory methods /equipment to enhance functional independence and safety.  Patient, family and/or caregiver will verbalize energy management techniques appropriate for status and setting.    Educational assessment/barriers to learning:   No barriers noted     Treatment provided this date:   ADL/Self care management- 15 minutes  - Provided skilled assessment of Allen collar fit. Attempted adjustment via tighter fit. Donned Aspen collar on patient- appropriate fit demonstrated in seated and standing position, but patient able to tuck chin below level with force. Educated patient on using different collar/ appropriate fit- will request from ALS association. If that doesn't work, recommend OP fitting at orthotist. Recommended patient wear collar at all times when standing/walking for safety/balance  - Educated patient on recommendation to sit in high back chair with head support to improve posture and decrease muscle strain to neck  - Educated patient on equipment recommendations including  use of bed cradle to improve mobility; will request from [employer]    Response to treatment/recommendations: patient verbalizes understanding/agreement    Goal attainment:  All goals met     Risks and benefits of evaluation/treatment have been explained.  Patient, family and/or caregiver are in agreement with Plan of Care.     Timed Code Treatment Minutes: 15  Total Treatment Time (sum of timed and untimed services): 25    Signature: Jamee Espana, PT   Date: 12/9/2021

## 2021-12-09 NOTE — LETTER
Date:January 12, 2022      Patient was self referred, no letter generated. Do not send.        [facility] Health Information

## 2021-12-09 NOTE — DISCHARGE INSTRUCTIONS
1. I'll ask Dr. FRYE to put in another orthotics referral. You would go as an outpatient to get fitted/sized appropriately  2. For now, use the collar anytime you are up walking. Make sure the pads are touching or overlapping (front over back) to get the snuggest fit. Take off when sitting, eating, etc  3. Recommend high back chair/wheelchair with head support to use at all times when sitting in order to rest neck muscles    Rissa Espana PT, DPT  Physical Therapist  [facility] [address]  [address] [place] [address]  [email or web address]  Appointments: [phone]

## 2021-12-16 ENCOUNTER — VIRTUAL VISIT (OUTPATIENT)
Dept: NEUROLOGY | Facility: CLINIC | Age: 71
End: 2021-12-16
Payer: COMMERCIAL

## 2021-12-16 DIAGNOSIS — G12.21 BRACHIAL AMYOTROPHIC DIPLEGIA (H): Primary | ICD-10-CM

## 2021-12-16 PROCEDURE — 96040 PR GENETIC COUNSELING, EACH 30 MIN: CPT | Mod: TEL | Performed by: GENETIC COUNSELOR, MS

## 2021-12-16 PROCEDURE — 99207 PR NO CHARGE LOS: CPT | Performed by: GENETIC COUNSELOR, MS

## 2021-12-16 NOTE — PROGRESS NOTES
"John Dunphy was seen for a genetic counseling appointment at the request of Dr. Ortiz today given his diagnosis of brachial amyotrophic diplegia.     Pertinent Medical History: Keyon is a 71 year old male with a history of brachial amyotrophic diplegia. See Dr. Ortiz's note for additional details.     Family History: A three generation pedigree was obtained today and scanned into the EMR. This family history is by patient report only and has not been verified with medical records except where noted. The following information is significant:     Keyon does not have children    Keyon has 2 brothers and 1 sister. Limited information is available for Keyon's brother (age 69) and his son. Keyon's brother (age 67) has a history of double vision and heart problems requiring stent placement. He has 3 healthy sons. Keyon sister (age 55) has a history of a concussion after a fall and may have some memory difficulties. She has 2 sons who are alive and well.    Keyon's father  at age 87 due to Alzheimer's disease that was diagnosed many years ago. He has 1 brother who does not have neurologic concerns. Limited information is available regarding Keyon's paternal cousins. Keyon's paternal grandfather  in his 80s due to natural causes. He had a history of \"shaky limbs\" during his lifetime. Keyon's paternal grandmother  young. She did not have a history of neurologic concerns.    Keyon's mother  at age 71 due to cancer that started in her abdomen and metastasized throughout her body. She did not have siblings. Keyon's maternal grandfather  young and did not have a history of neurologic concerns. Keyon's maternal grandmother  due to polio and did not have a history of neurologic concerns.    Ancestry is [place] and [place]. Family history is negative for ALS and Parkinson's disease. Consanguinity was denied.    Discussion: We reviewed that Keyon has a condition called brachial amyotrophic diplegia (also called " flail arm syndrome or man in barrel syndrome). Rarely, this condition can be caused by mutations in a gene called SOD1. If his condition is caused by a mutation or change in this gene, there is a targeted medication that may be available to him.     Our genes are sequences of letters that provide instructions that help our body grow, develop and function. Sometimes a change occurs in one of our genes that causes the body to be unable to read these instructions. This results in a genetic condition.    Genetic testing is necessary to determine if Keyon has a variant in his SOD1 gene. There are three possible results for this testing:    o Positive: A positive result indicates that a genetic variant has been identified that explains the cause of Keyon s symptoms. A positive result will provide information on prognosis and other symptoms related to the genetic change. It will also help guide medical management for Keyon and may provide information to other family members regarding their risk.   o Negative: A negative result indicates that a disease causing genetic variant was not identified  o Variant of uncertain significance (VUS): A VUS is an uncertain result that indicates a genetic change was identified, but it is currently unknown if that change is associated with a genetic disorder.    Risks benefits and limitations of this testing were reviewed. Keyon expressed an excellent understanding of this information and decided to pursue genetic testing for SOD1 pending insurance approval. Keyon lives in a nursing home and communication via text/email is difficult. He has requested that the lab call him with information related to the cost of the testing if it is over 100 dollars. I will communicate this to the lab. Keyon will also provide a DNA sample while he is at the nursing home where he lives. I spoke with a nurse, Lizbeth Brothers, and they recommended the buccal swab kit for sample collection. A buccal kit will be  sent to the nursing home at the following address:    Attn: John Dunphy  [address] [facility] [address]    Plan:  1. SOD1 sequencing and deletion/duplication analysis   2. Return pending results of above testing  3. Contact information was declined    Gwen García MS [facility]  Genetic Counselor  Division of Genetics and Metabolism  (p) [phone]  (f) [phone]     Total time spent in consultation with the family was approximately 30 minutes    Cc: No Letter

## 2021-12-16 NOTE — Clinical Note
"2021       RE: John C Dunphy  [address] [facility] [address]     Dear Colleague,    Thank you for referring your patient, John C Dunphy, to the [facility] NEUROLOGY CLINIC [place] at [facility]. Please see a copy of my visit note below.    John Dunphy was seen for a genetic counseling appointment at the request of Dr. Ortiz today given his diagnosis of brachial amyotrophic diplegia.     Pertinent Medical History: Keyon is a 71 year old male with a history of brachial amyotrophic diplegia. See Dr. Ortiz's note for additional details.     Family History: A three generation pedigree was obtained today and scanned into the EMR. This family history is by patient report only and has not been verified with medical records except where noted. The following information is significant:     Keyon does not have children    Keyon has 2 brothers and 1 sister. Limited information is available for Keoyn's brother (age 69) and his son. Keyon's brother (age 67) has a history of double vision and heart problems requiring stent placement. He has 3 healthy sons. Keyon sister (age 55) has a history of a concussion after a fall and may have some memory difficulties. She has 2 sons who are alive and well.    Keyon's father  at age 87 due to Alzheimer's disease that was diagnosed many years ago. He has 1 brother who does not have neurologic concerns. Limited information is available regarding Keyon's paternal cousins. Keyon's paternal grandfather  in his 80s due to natural causes. He had a history of \"shaky limbs\" during his lifetime. Keyon's paternal grandmother  young. She did not have a history of neurologic concerns.    Keyon's mother  at age 71 due to cancer that started in her abdomen and metastasized throughout her body. She did not have siblings. Keyon's maternal grandfather  young and did not have a history of neurologic concerns. Keyon's " maternal grandmother  due to polio and did not have a history of neurologic concerns.    Ancestry is [place] and [place]. Family history is negative for ALS and Parkinson's disease. Consanguinity was denied.    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 Rarely, this condition can be caused by mutations in a gene called SOD1. If his condition is caused by a mutation or change in this gene, there is a targeted medication that may be available to him.     Our genes are sequences of letters that provide instructions that help our body grow, develop and function. Sometimes a change occurs in one of our genes that causes the body to be unable to read these instructions. This results in a genetic condition.    Genetic testing is necessary to determine if Keyon has a variant in his SOD1 gene. There are three possible results for this testing:    o Positive: A positive result indicates that a genetic variant has been identified that explains the cause of Keyon s symptoms. A positive result will provide information on prognosis and other symptoms related to the genetic change. It will also help guide medical management for Keyon and may provide information to other family members regarding their risk.   o Negative: A negative result indicates that a disease causing genetic variant was not identified  o Variant of uncertain significance (VUS): A VUS is an uncertain result that indicates a genetic change was identified, but it is currently unknown if that change is associated with a genetic disorder.    Risks benefits and limitations of this testing were reviewed. Keyon expressed an excellent understanding of this information and decided to pursue genetic testing for SOD1 pending insurance approval. Keyon lives in a nursing home and communication via text/email is difficult. He has requested that the lab call him with  information related to the cost of the testing if it is over 100 dollars. I will communicate this to the lab. Keyon will also provide a DNA sample while he is at the nursing home where he lives. I spoke with a nurse, Lizbeth Brothers, and they recommended the buccal swab kit for sample collection. A buccal kit will be sent to the nursing home at the following address:    Attn: John Dunphy  [address] [facility] [address]    Plan:  1. SOD1 sequencing and deletion/duplication analysis   2. Return pending results of above testing  3. Contact information was declined    Gwen García [facility]  Genetic Counselor  Division of Genetics and Metabolism  (p) [phone]  (f) [phone]     Total time spent in consultation with the family was approximately 30 minutes    Cc: No Letter        Again, thank you for allowing me to participate in the care of your patient.      Sincerely,    Gwen García, GC

## 2022-01-01 ENCOUNTER — ANCILLARY PROCEDURE (OUTPATIENT)
Dept: GENERAL RADIOLOGY | Facility: CLINIC | Age: 72
End: 2022-01-01
Attending: INTERNAL MEDICINE
Payer: COMMERCIAL

## 2022-01-01 DIAGNOSIS — G12.21 AMYOTROPHIC LATERAL SCLEROSIS (H): ICD-10-CM

## 2022-01-01 DIAGNOSIS — R13.14 DYSPHAGIA, PHARYNGOESOPHAGEAL PHASE: ICD-10-CM

## 2022-01-01 PROCEDURE — 74220 X-RAY XM ESOPHAGUS 1CNTRST: CPT | Mod: GC | Performed by: RADIOLOGY

## 2022-01-17 ENCOUNTER — TELEPHONE (OUTPATIENT)
Dept: NEUROLOGY | Facility: CLINIC | Age: 72
End: 2022-01-17

## 2022-01-17 NOTE — TELEPHONE ENCOUNTER
Contacted Keyon to review the results of his SOD1 genetic testing. The staff at the nursing home where he lives stated that they don't have the staff to hold the phone for him. They asked that I call back at another time.    When I speak with him, I will explain that the results of his SOD1 testing were negative or normal. This means that no disease causing changes were identified in this gene and we did not find the cause of his symptoms.    Spoke with Keyon and reviewed the information above. Keyon expressed an excellent understanding of this information. A copy of this report will not be sent to Keyon as he declined this option.    Gwen Garíca [facility]  Genetic Counselor  Division of Genetics and Metabolism  (p) [phone]  (f) [phone]

## 2022-04-19 ENCOUNTER — TELEPHONE (OUTPATIENT)
Dept: NEUROLOGY | Facility: CLINIC | Age: 72
End: 2022-04-19
Payer: COMMERCIAL

## 2022-04-19 DIAGNOSIS — R29.898 DROPPED HEAD SYNDROME: Primary | ICD-10-CM

## 2022-04-19 DIAGNOSIS — G12.21 ALS (AMYOTROPHIC LATERAL SCLEROSIS) (H): ICD-10-CM

## 2022-04-19 NOTE — TELEPHONE ENCOUNTER
Health Call Center    Phone Message    May a detailed message be left on voicemail: yes     Reason for Call: Order(s): Other:   Reason for requested: Orthotics  Date needed: ASAP  Provider name: Dr. Diana Andrade at [facility] called to ask about referral to Orthotics that is referenced in 12/9/21 After Visit Summary.    She states she has called several times and is still waiting for this referral to get Pt scheduled for neck collar.    Please place referral and call Raymond back at [phone] to advise when ready to schedule.    Action Taken: Message routed to:  [facility] ([facility] Neurology    Travel Screening: Not Applicable                                                                        
Order entered. Informed Raymond.  
Statement Selected

## 2022-06-21 DIAGNOSIS — G12.21 ALS (AMYOTROPHIC LATERAL SCLEROSIS) (H): Primary | ICD-10-CM

## 2022-06-23 ENCOUNTER — RESEARCH ENCOUNTER (OUTPATIENT)
Dept: NEUROLOGY | Facility: CLINIC | Age: 72
End: 2022-06-23

## 2022-06-23 ENCOUNTER — TELEPHONE (OUTPATIENT)
Dept: NEUROLOGY | Facility: CLINIC | Age: 72
End: 2022-06-23

## 2022-06-23 ENCOUNTER — OFFICE VISIT (OUTPATIENT)
Dept: NEUROLOGY | Facility: CLINIC | Age: 72
End: 2022-06-23
Payer: COMMERCIAL

## 2022-06-23 ENCOUNTER — THERAPY VISIT (OUTPATIENT)
Dept: SPEECH THERAPY | Facility: CLINIC | Age: 72
End: 2022-06-23
Payer: COMMERCIAL

## 2022-06-23 ENCOUNTER — THERAPY VISIT (OUTPATIENT)
Dept: PHYSICAL THERAPY | Facility: CLINIC | Age: 72
End: 2022-06-23
Payer: COMMERCIAL

## 2022-06-23 VITALS
WEIGHT: 181 LBS | OXYGEN SATURATION: 96 % | DIASTOLIC BLOOD PRESSURE: 70 MMHG | BODY MASS INDEX: 29.21 KG/M2 | SYSTOLIC BLOOD PRESSURE: 109 MMHG | HEART RATE: 70 BPM | RESPIRATION RATE: 16 BRPM

## 2022-06-23 DIAGNOSIS — R13.12 OROPHARYNGEAL DYSPHAGIA: ICD-10-CM

## 2022-06-23 DIAGNOSIS — G12.21 BRACHIAL AMYOTROPHIC DIPLEGIA (H): ICD-10-CM

## 2022-06-23 DIAGNOSIS — G12.21 ALS (AMYOTROPHIC LATERAL SCLEROSIS) (H): Primary | ICD-10-CM

## 2022-06-23 DIAGNOSIS — G12.21 ALS (AMYOTROPHIC LATERAL SCLEROSIS) (H): ICD-10-CM

## 2022-06-23 PROCEDURE — 92610 EVALUATE SWALLOWING FUNCTION: CPT | Mod: GN | Performed by: SPEECH-LANGUAGE PATHOLOGIST

## 2022-06-23 PROCEDURE — 97535 SELF CARE MNGMENT TRAINING: CPT | Mod: GP | Performed by: PHYSICAL THERAPIST

## 2022-06-23 PROCEDURE — 92526 ORAL FUNCTION THERAPY: CPT | Mod: GN | Performed by: SPEECH-LANGUAGE PATHOLOGIST

## 2022-06-23 PROCEDURE — 97162 PT EVAL MOD COMPLEX 30 MIN: CPT | Mod: GP | Performed by: PHYSICAL THERAPIST

## 2022-06-23 PROCEDURE — 94799 UNLISTED PULMONARY SVC/PX: CPT | Performed by: INTERNAL MEDICINE

## 2022-06-23 PROCEDURE — 94375 RESPIRATORY FLOW VOLUME LOOP: CPT | Performed by: INTERNAL MEDICINE

## 2022-06-23 PROCEDURE — 99214 OFFICE O/P EST MOD 30 MIN: CPT | Performed by: PSYCHIATRY & NEUROLOGY

## 2022-06-23 RX ORDER — ACETAMINOPHEN 500 MG
500-1000 TABLET ORAL EVERY 6 HOURS PRN
COMMUNITY

## 2022-06-23 RX ORDER — MULTIVIT WITH MINERALS/LUTEIN
1 TABLET ORAL DAILY
COMMUNITY

## 2022-06-23 ASSESSMENT — REVISED AMYOTROPHIC LATERAL SCLEROSIS FUNCTIONAL RATING SCALE (ALSFRS-R)
DRESSING_AND_HYGEINE: TOTAL DEPENDENCE
RESPIRATORY_INSUFFICIENCY: CONTINUOUS USE OF NIPPV DURING THE NIGHT
DRESSING_AND_HYGEINE: 0
FINE_MOTOR_SUBTOTAL_SCORE: 0
SWALLOWING: 2
CLIMBING_STAIRS: 0
RESPIRATORY_SUBTOTAL_SCORE: 9
CLIMBING_STAIRS: CANNOT DO
GROSS_MOTOR_SUBTOTAL_SCORE: 4
WALKING: WALKS WITH ASSISTANCE
SWALLOWING: DIETARY CONSISTENCY CHANGES
TURNING_IN_BED_AND_ADJUSTING_BED_CLOTHES: 2
CUTTING_FOOD_AND_HANDLING_UTENSILES: 0
TURNING_IN_BED_AND_ADJUSTING_BED_CLOTHES: CAN TURN ALONE OR ADJUST SHEETS, BUT WITH GREAT DIFFICULTY
SPEECH: NORMAL SPEECH PROCESSES
DYSPNEA: 3
HANDWRITING: 0
SALIVATION: 3
SIX_ITEM_SUBTOTAL: 11
SALIVATION: SLIGHT BUT DEFINITE EXCESS OF SALIVA IN MOUTH; MAY HAVE NIGHTTIME DROOLING
ALSFRS_TOTAL_SCORE: 22
ORTHOPENA: 4
RESPIRATORY_INSUFFICIENCY: 2
HANDWRITING: UNABLE TO GRIP PEN
SPEECH: 4
WALKING: 2
DYSPNEA: OCCURS WHEN WALKING
BULBAR_SUBTOTAL: 9

## 2022-06-23 ASSESSMENT — PAIN SCALES - GENERAL: PAINLEVEL: NO PAIN (0)

## 2022-06-23 NOTE — PROGRESS NOTES
Service Date: 06/23/2022    HISTORY OF PRESENT ILLNESS:  I had the pleasure to see Mr. Dunphy in followup at the [facility].  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 Onset of symptoms with arm weakness was in 05/2018.  Since his last visit, he is almost completely losing his arm function now.  He only has a little flexion of his index finger.  In terms of leg weakness, he is less certain.  He can stand without help.  He has not had any falls and does not use any walker.  He does not endorse any difficulty turning in bed.  He had COVID-19 about a month ago and then he had another upper respiratory illness about a week or so ago with severe sore throat and sharp pain in the throat to the point he had no taste for food and he could not eat.  This was not COVID.  He had repeated swabs and testing for respiratory virus and other pathogens that were negative.  It seems to be improving now.  He also had a lot of phlegm accumulation with that and some dyspnea.  It is getting better.  He does have some dysphagia, particularly with rice or breads.  He has to cut food in smaller pieces and chew slowly or he can occasionally choke.  This does not appear much worse than the last time I saw him.  He continues to have a head drop.  He has been given 3 different types of head collars, including softer and more rigid ones, and he finds none of them comfortable.  He is on riluzole 50 mg b.i.d., tolerating it well.  Last liver function tests were in 03/2021, which were normal.  He does not have clearcut orthopnea.  He sleeps with 2 pillows at night.  He uses a CPAP for obstructive sleep apnea.  He is in a nursing home, but unfortunately, he no more has PT, OT or speech services because they are short staffed.  Genetic testing for ALS was done in 12/2021; SOD1 mutations were ruled out.     Medications were reviewed and are as  per Epic record.    /70   Pulse 70   Resp 16   Wt 82.1 kg (181 lb)   SpO2 96%   BMI 29.21 kg/m      PFT Latest Ref Rng & Units 6/23/2022   FVC L 2.43   FEV1 L 1.97   FVC% % 67   FEV1% % 71     On exam today, he has 4/5 hip flexion bilaterally.  Knee extension and knee flexion are full.  Foot dorsiflexion is 4- on the right, 4 on the left.  He has near plegia of the upper extremities.  He has mild weakness of orbicularis oculi, but no weakness of orbicularis oris, cheek puff or tongue.  Tongue does not show any atrophy or fasciculations.  There is no overt dysphonia or dysarthria today.  There is no weakness of the jaw.  He has severe neck flexion and extension weakness in the range of less than 3/5.      His pulmonary function tests today do show a forced vital capacity of 67%, which is similar to his last visit, FEV1 71% that is similar and MIP -59 cm of water that is a little less than previous, which was -70.    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  He does fulfill criteria for ALS at this point.      We discussed a number of practical issues.  He will continue the riluzole. I do not think that [facility] ORS would be beneficial for him with weakness present for >4 years, FVC of 67% and inability to eat on his own.     Because his MIP is -59, he meets criteria for noninvasive ventilation due to diaphragmatic weakness from ALS.  Early initiation of noninvasive ventilation can improve survival, quality of life and daytime energy.  I recommend switching from CPAP to BiPAP and will notify his nursing home.      Given the lack of ancillary services at his home right now, I will ask our physical and speech therapists to assess him.  Our occupational therapist was not in clinic today; she will reach out to him when she is back.     He has lost a considerable amount of weight lately  and I will have our dietitian talk to him about high calorie diet and arresting the weight loss, which can accelerate disease progression if continues.      We have not discussed advanced directives yet.  He will return to clinic for followup in about 3-4 months.    Billing MDM level 4 (moderate) based on 1) Problems assessed: One chronic disorder with progression, exacerbation, or side effects of treatment (ALS) and 2) Risk: prescription drug management (on riluzole).     Taras Ortiz MD        D: 2022   T: 2022   MT: hamilton    Name:     DUNPHY, JOHN C.  MRN:      [id number]        Account:      [id number]   :      1950           Service Date: 2022       Document: [id number]

## 2022-06-23 NOTE — PROGRESS NOTES
[facility]      Outpatient Speech Language Pathology Neurology Clinic Evaluation  Dunphy,John C YOB: 1950 [id number]    Visit Date: 6/23/2022    Age: 71 year old    Medical Diagnosis: Amyotrophic lateral sclerosis (ALS)    Date of Diagnosis: 10/29/2020    Referring MD: Dr Ortiz     present: No    PMH: Refer to Medical Chart    Fall Risk:Refer to physician report.    Others at Clinic Visit: Himanshu who is an RT at his SNF [facility]    Living Situation: Facility / assisted living    Patient Concerns/Goals: Increased swallowing difficulty    Observations: Patient is new to this SLP although has been attending this clinic for several years. He is seen for first time today due to new onset dysphagia with solids.    Current Mode of Nutrition: Oral diet of regular solids and thin liquids, avoids some dry crunchy foods such as toast as well as rice and ground beef. He is in a facility therefore has limited control over types of foods available.    Weight: 181lbs    Cardio-Respiratory Status: Forced vital capacity: 67%    Functional Rating Scale (ALS-FRS): 22/48      Oral Motor/Swallowing  Oral Motor Function: Anomalies present:    Mandibular anomaly- none  Labial anomaly- none  Lingual anomaly- mildly reduced elevation and protrusion, mildly reduced strength (posterior weakness suggested by DDK)  Velar elevation- adequate  Buccal strength- adequate    Volitional Abilities:  Cough- Functional  Throat Clear- Functional  Swallow- Present    Feeding Assistance: Dependent   Oral Cares: poor, he is dependent on staff and this does not get done regularly, he fears he has fillings that are falling out    Swallow Function:   Thin Liquids- reduced bolus control and mildly reduced laryngeal elevation but no s/s aspiration observed  Regular Solids- reduced bolus control and mildly reduced laryngeal  elevation but no s/s aspiration observed    Dysphagia Diagnosis: Mild dysphagia     Dysphagia Intervention: SLP educated in swallowing anatomy and physiology including aspiration and possible respiratory compromise from aspiration. Trained safe swallow strategies to reduce aspiration risks (small bites/sips, slow rate of intake, chin tuck/neutral head position, mindful swallowing, use caution with straws, alternate consistencies). Educated in role of oral cares in respiratory health with increased aspiration risks, trained and encouraged good oral hygiene. Also trained diet modifications to reduce risk of aspiration and ease intake (avoid hard dry foods with particles and choosing soft moist solids)      Speech Intelligibility/Functional Communication   Methods of communication: Verbal    Dysarthria: none    Speech: He denies changes    Speech Intelligibility/Communication:Communicates functionally    Augmentative and Alternative Communication (AAC): Not addressed this visit      Clinical Impression/Plan of Care  Treatment Diagnosis: Oropharyngeal Dysphagia     Recommendations:  Oral diet.  Soft, moist solid with no particulate.  Continue use of compensatory strategies.    Plan of Care:  1 session evaluation and treatment.    Goals: Based on today's evaluation session patient and/or caregiver will have understanding of current communication and/or swallowing status and recommendations for management.    Educational Assessment:  Learners- Patient  Barriers to Learning- No barriers.    Education provided/response:   Swallowing  Diet  Verbalized understanding    Treatment provided this date:   Swallow intervention, 29 minutes    Response to recommendations/treatment: verbalized understanding, asked appropriate questions, agreed to recommendations    Goal attainment: All goals met    Risks and benefits of evaluation/treatment have been explained.  Patient, family and/or caregiver are in agreement with Plan of  Care.    Timed Code Treatment Minutes: 0  Total Treatment Time (sum of timed and untimed services): 41 minutes

## 2022-06-23 NOTE — LETTER
Date:June 24, 2022      Provider requested that no letter be sent. Do not send.       [facility]

## 2022-06-23 NOTE — NURSING NOTE
Chief Complaint   Patient presents with     RECHECK     RETURN ALS/MOTOR NEURON     Pawel Johnson

## 2022-06-23 NOTE — DISCHARGE INSTRUCTIONS
Have someone measure the chairs that you are having difficulty getting up from. ALS association will send these out to you when you give them the measurements.    Rissa Espana PT, DPT  Physical Therapist  [facility] [place] - [address]  [address] [place] [address]  [email or web address]  Appointments: [phone]

## 2022-06-23 NOTE — DISCHARGE INSTRUCTIONS
Have someone measure the chairs that you are having difficulty getting up from. ALS association will send these out to you when you give them the measurements.    Rissa Espana PT, DPT  Physical Therapist  [facility] - [address]  [address] [place] [address]  [email or web address]  Appointments: [phone]

## 2022-06-23 NOTE — TELEPHONE ENCOUNTER
BiPAP orders sent with [facility] staff with contact number for  questions/concerns.

## 2022-06-23 NOTE — PROGRESS NOTES
"[facility]    OUTPATIENT PHYSICAL THERAPY CLINIC NOTE  Dunphy,John C  YOB: 1950  [id number]    Type of visit:         Evaluation     Date of service: 6/23/2022    Referring provider: Taras Ortiz MD     present: No    Others present at visit:   at [facility] diagnosis:   Amyotrophic lateral sclerosis (ALS) -  flail arm syndrome/brachial amyotrophic diplegia     Date of diagnosis: 10/29/2020    Pertinent history of current problem (include personal factors and/or comorbidities that impact the plan of care): Symptom onset as bilateral arm weakness began in May 2018 which has now progressed to near paralysis of BUE (has some distal control). Patient uses CPAP at night. Began on Riluzole.    Cardio-respiratory status:  Forced vital capacity: 67 % of predicted     Height/Weight: 5'6\" / 181lbs    [facility]    Living environment barriers:  None     Current assistance/living environment:  Requires partial assistance      Current mobility equipment:  Aspen collar- does not use. Has tried multiple collars, but none have worked well  Recliner wheelchair-uses outside of facility for appointments  Bed cradle  *Recommending cushions to increase height of chairs at facility to improve safety with sit to stand transfers     Current ADL equipment:  None    Technology used: N/A    Patient concerns/goals: Some more deconditioning due to prolonged immobility following illnesses (COVID and other virus). Needs assist at all times getting out of bed now.  Difficulty standing up from 2 chairs at facility due to lower height. 1 fall sliding out of a wheelchair    Evaluation   Interview completed.   Pain assessment:  Pain denied     Range of motion: WFL     Manual muscle testing:  Cervical weakness- especially neck extensors resulting in " head drop. Patient able to stand from chair without UE support. Paralysis of UEs, except distally in fingers   Gait:  Patient ambulates  without device modified independently- flexed forward posture due to head drop, and forward arm placement    Cognition:  Intact   FRS: 22    Fall Risk Screen:   Has the patient fallen 2 or more times in the last year? No      Has the patient fallen and had an injury in the past year? No       Timed Up and Go Score: <13.5 seconds    Is the patient a fall risk? Yes, department fall risk interventions implemented     Impairments:  Fatigue  Muscle atrophy  Coordination     Treatment diagnosis:  Impaired mobility  Impaired activities of daily living    Clinical Presentation: Evolving/Changing  Clinical Presentation Rationale: personal factors, body systems involved, progressive nature of disease  Clinical Decision Making (Complexity): Moderate complexity     Recommendations/Plan of care:  1 session evaluation & treatment.  Equipment recommended: cushions-requested from ALS Association     Goals:   Target date: 6/23/2022  Patient, family and/or caregiver will verbalize understanding of evaluation results and implications for functional performance.  Patient, family and/or caregiver will verbalize/demonstrate understanding of compensatory methods /equipment to enhance functional independence and safety.  Patient, family and/or caregiver will verbalize energy management techniques appropriate for status and setting.    Educational assessment/barriers to learning:   No barriers noted     Treatment provided this date:   ADL/self-care management-10 minutes   -Collaborated with patient on fall risk reduction techniques including safety with sit to stand transfers, especially from lower height chairs due to difficulty.  Patient reports difficulty standing up from low height chair; recommended cushions and Dycem  to be ordered through ALS Association for mechanics.  Patient  agreeable  -Educated patient on energy conservation techniques including pacing of activities, frequent rest breaks, use of assistive device and monitoring signs of fatigue (increased muscle soreness, weakness, cramps, fasciculations) for safe functional mobility and performance of daily tasks    Response to treatment/recommendations: patient verbalizes understanding/agreement    Goal attainment:  All goals met     Risks and benefits of evaluation/treatment have been explained.  Patient, family and/or caregiver are in agreement with Plan of Care.     Timed Code Treatment Minutes: 10  Total Treatment Time (sum of timed and untimed services): 20    Signature: Jamee Espana, PT   Date: 12/9/2021

## 2022-06-23 NOTE — PATIENT INSTRUCTIONS
Please call [facility] @ [phone] for questions or concerns during regular business hours. For a more efficient way to communicate, use [employer] and address the message to your physician. Remember, MyChart is only read during business hours. Do not leave urgent messages on voicemail or [employer]. If situation is urgent, contact the Neurology Clinic @ [phone] and ask to speak to a Triage Nurse or Call 911 or visit an Emergency Department.    Please call your pharmacy if you need a medication refill. They will send us an electronic message.

## 2022-06-23 NOTE — LETTER
6/23/2022       RE: John C Dunphy  [address]  [facility] [address]     Dear Colleague,    Thank you for referring your patient, John C Dunphy, to the [facility] [place] at [facility]. Please see a copy of my visit note below.    Service Date: 06/23/2022    HISTORY OF PRESENT ILLNESS:  I had the pleasure to see Mr. Dunphy in followup at the [facility].  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 Onset of symptoms with arm weakness was in 05/2018.  Since his last visit, he is almost completely losing his arm function now.  He only has a little flexion of his index finger.  In terms of leg weakness, he is less certain.  He can stand without help.  He has not had any falls and does not use any walker.  He does not endorse any difficulty turning in bed.  He had COVID-19 about a month ago and then he had another upper respiratory illness about a week or so ago with severe sore throat and sharp pain in the throat to the point he had no taste for food and he could not eat.  This was not COVID.  He had repeated swabs and testing for respiratory virus and other pathogens that were negative.  It seems to be improving now.  He also had a lot of phlegm accumulation with that and some dyspnea.  It is getting better.  He does have some dysphagia, particularly with rice or breads.  He has to cut food in smaller pieces and chew slowly or he can occasionally choke.  This does not appear much worse than the last time I saw him.  He continues to have a head drop.  He has been given 3 different types of head collars, including softer and more rigid ones, and he finds none of them comfortable.  He is on riluzole 50 mg b.i.d., tolerating it well.  Last liver function tests were in 03/2021, which were normal.  He does not have clearcut orthopnea.  He  sleeps with 2 pillows at night.  He uses a CPAP for obstructive sleep apnea.  He is in a nursing home, but unfortunately, he no more has PT, OT or speech services because they are short staffed.  Genetic testing for ALS was done in 12/2021; SOD1 mutations were ruled out.     Medications were reviewed and are as per Epic record.    /70   Pulse 70   Resp 16   Wt 82.1 kg (181 lb)   SpO2 96%   BMI 29.21 kg/m      PFT Latest Ref Rng & Units 6/23/2022   FVC L 2.43   FEV1 L 1.97   FVC% % 67   FEV1% % 71     On exam today, he has 4/5 hip flexion bilaterally.  Knee extension and knee flexion are full.  Foot dorsiflexion is 4- on the right, 4 on the left.  He has near plegia of the upper extremities.  He has mild weakness of orbicularis oculi, but no weakness of orbicularis oris, cheek puff or tongue.  Tongue does not show any atrophy or fasciculations.  There is no overt dysphonia or dysarthria today.  There is no weakness of the jaw.  He has severe neck flexion and extension weakness in the range of less than 3/5.      His pulmonary function tests today do show a forced vital capacity of 67%, which is similar to his last visit, FEV1 71% that is similar and MIP -59 cm of water that is a little less than previous, which was -70.    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  He does fulfill criteria for ALS at this point.      We discussed a number of practical issues.  He will continue the riluzole. I do not think that Radicava ORS would be beneficial for him with weakness present for >4 years, FVC of 67% and inability to eat on his own.     Because his MIP is -59, he meets criteria for noninvasive ventilation due to diaphragmatic weakness from ALS.  Early initiation of noninvasive ventilation can improve survival, quality of life and daytime energy.  I recommend switching from CPAP to BiPAP  and will notify his nursing home.      Given the lack of ancillary services at his home right now, I will ask our physical and speech therapists to assess him.  Our occupational therapist was not in clinic today; she will reach out to him when she is back.     He has lost a considerable amount of weight lately and I will have our dietitian talk to him about high calorie diet and arresting the weight loss, which can accelerate disease progression if continues.      We have not discussed advanced directives yet.  He will return to clinic for followup in about 3-4 months.    Billing MDM level 4 (moderate) based on 1) Problems assessed: One chronic disorder with progression, exacerbation, or side effects of treatment (ALS) and 2) Risk: prescription drug management (on riluzole).     Taras Ortiz MD        D: 2022   T: 2022   MT: hamilton    Name:     DUNPHY, JOHN C.  MRN:      [id number]        Account:      [id number]   :      1950           Service Date: 2022       Document: [id number]      Again, thank you for allowing me to participate in the care of your patient.      Sincerely,    Taras Ortiz MD

## 2022-06-23 NOTE — PROGRESS NOTES
Natural History of ALS and other motor neuron disorders  PI: Lucien Carson MD  : Fatmata Demetria [phone]    Registry / Observational study    Patient was approached for possible participation for the Natural History of ALS and other motor neuron disorders registry. The current approved consent and authorization form was discussed and explained to the patient. It was discussed that involvement with the study is voluntary and refusal to participate would not involve penalty or decrease benefits at which the patient is entitled, and the subject may discontinue his/her involvement at any time without penalty or loss in benefits. The patient was given time to review and ask any questions about the consent. Patient verbalized understanding of the study. A copy of the signed consent was given to the patient for their records.     Subject Informed Consent and Authorization Form: SIGNED ON 06/23/2022   27.1

## 2022-06-24 ENCOUNTER — ALLIED HEALTH/NURSE VISIT (OUTPATIENT)
Dept: NEUROLOGY | Facility: CLINIC | Age: 72
End: 2022-06-24

## 2022-06-24 LAB
EXPTIME-PRE: 6.82 SEC
FEF2575-%PRED-PRE: 92 %
FEF2575-PRE: 1.98 L/SEC
FEF2575-PRED: 2.14 L/SEC
FEFMAX-%PRED-PRE: 66 %
FEFMAX-PRE: 4.86 L/SEC
FEFMAX-PRED: 7.28 L/SEC
FEV1-%PRED-PRE: 71 %
FEV1-PRE: 1.97 L
FEV1FEV6-PRE: 81 %
FEV1FEV6-PRED: 77 %
FEV1FVC-PRE: 81 %
FEV1FVC-PRED: 76 %
FIFMAX-PRE: 3.06 L/SEC
FVC-%PRED-PRE: 67 %
FVC-PRE: 2.43 L
FVC-PRED: 3.62 L
MEP-PRE: 78 CMH2O
MIP-PRE: -59 CMH2O

## 2022-06-24 NOTE — PROGRESS NOTES
"CLINICAL NUTRITION SERVICES - ASSESSMENT NOTE    John C Dunphy 71 year old referred for MNT related to ALS    Visit Type: Initial  Referring Physician: Dr. Ortiz  Pt accompanied by: Himanshu, employee from [facility] facility    NUTRITION HISTORY  Factors affecting nutrition intake include:feeding difficulties and lack of staff available to assist with feeding  Current diet: Regular diet  Current appetite/intake: Good  PEG Tube: NA    Diet Recall  Breakfast 2 eggs, Raisin Bran, OJ   Lunch Casserole or hotdish, noodles, salad - typically largest meal   Dinner Meat (chicken, beef, fish), veggie, dessert, fruit, fruit and jello, salads   Snacks Ensure, Magic cup, Gelatein   Beverages Coffee, water, fruit juice     ANTHROPOMETRICS  Height: 1.676 m (5' 6\")    Weight: 82.1 kg (181 lb)  BMI: 29.2 kg/m2  Weight Status:  Overweight BMI 25-29.9  IBW: 64.5 kg  % IBW: 127%  Weight History: 20# (10%) wt loss over past 6 months  Wt Readings from Last 10 Encounters:   06/23/22 82.1 kg (181 lb)   12/09/21 91.2 kg (201 lb)   10/29/20 97.5 kg (215 lb)   09/17/20 98.4 kg (216 lb 14.4 oz)     Dosing Weight: 69 kg (adjusted based on 82 kg and IBW 65 kg)    ALS FRS-6 (Modification of H-B equations for ALS)   BMR = 1549 Kcal/day   1 - Speech Score - 4   4 - Handwriting -  0   6 - Dressing and Hygiene -  0   7 - Turning in Bed - 2   8 - Walking - 2   10 a - Dyspnea - 3   Total ALS FRS 6 score =  11   Kcal needs per ALS- FRS = 1997     Medications/vitamins/minerals/herbals:   Reviewed    LABS  Labs reviewed    ASSESSED NUTRITION NEEDS:  Estimated Energy Needs: 0948-7872 kcals (30-35 Kcal/Kg)  Justification: increased needs associated with ALS  Estimated Protein Needs: 70-83 grams protein (1-1.2 g pro/Kg)  Justification: increased needs associated with ALS  Estimated Fluid Needs: 1725  mL   Justification: maintenance    MALNUTRITION:  % Weight Loss:  Up to 10% in 6 months (moderate malnutrition)  % Intake:  </= 75% for >/= 1 month (severe " malnutrition)  Subcutaneous Fat Loss:  None observed  Muscle Loss:  None observed  Fluid Retention:  None noted    Malnutrition Diagnosis: Moderate malnutrition  In Context of:  Chronic illness or disease    NUTRITION DIAGNOSIS:  Inadequate oral intake related to feeding difficulties and increases needs associated with ALS as evidenced by patient report and 20# (10%) wt loss over past 6 months    INTERVENTIONS  Recommendations / Nutrition Prescription  1. Continue Regular diet  2. Pt needs to have access to Ensure (chocolate or strawberry), magic cup, and gelatein. Pt should have 1-2 oral nutrition supplements per day.     Nutrition Education     Provided written & verbal education on:   - Ways to maximize kcal and protein intake. Discussed calorie and protein needs for maintenance of weight and nutrition status.  Advised pt to aim for at least 2070 kcal and 70 g protein via 5-6 small frequent meals.  Discussed that as ALS progresses, eating may become more difficult and discussed ways to cope with this.   - Discussed potential need for G-tube.  Described placement surgery and how it is used. Discussed formula and administration methods (gravity and bolus).  Discussed that this would be able to provide full nutrition prn.    - ONS (Ensure Enlive, Ensure Plus/Boost Plus, CIB, Benecalorie, Scandi shake etc). Suggested ways to incorporate these supplements to avoid flavor fatigue. Encouraged pt to start consuming 2 ONS daily.       Provided pt with corresponding education materials/handouts on:  Six Small Meals a Day, High Calorie, High Protein Recipe booklet, Tips to Increase the Calories in Your Diet, Tips to Increase the Protein in Your Diet and Protein Sources.      Pt verbalize understanding of materials provided during consult.   Patient Understanding: Excellent  Expected Compliance: Excellent     Implementation  General/healthful diet and Medical Food Supplement    Goals  1.  Aim for 5-6 small frequent meals  2.   Aim for 2070 kcal and 70 g protein/day  3. Weight maintenance      Follow-Up Plans: Pt has RD contact information for questions.    Pt encouraged to follow up with RD at next clinic visit in 3-6 months.    MONITORING AND EVALUATION:  -Food intake  -Fluid/beverage intake  -Liquid meal replacement or supplement  -Weight trends    Marleny Stewart RDN, LD

## 2022-09-23 ENCOUNTER — TELEPHONE (OUTPATIENT)
Dept: SLEEP MEDICINE | Facility: CLINIC | Age: 72
End: 2022-09-23

## 2022-09-23 DIAGNOSIS — G47.33 OSA ON CPAP: Primary | ICD-10-CM

## 2022-09-23 NOTE — TELEPHONE ENCOUNTER
Fax received from [facility] sleep referral. Scanned to patient chart and routed to Dr. Matthews.

## 2022-10-11 DIAGNOSIS — G12.21 ALS (AMYOTROPHIC LATERAL SCLEROSIS) (H): Primary | ICD-10-CM

## 2022-10-11 DIAGNOSIS — G12.21 BRACHIAL AMYOTROPHIC DIPLEGIA (H): ICD-10-CM

## 2022-10-13 ENCOUNTER — ALLIED HEALTH/NURSE VISIT (OUTPATIENT)
Dept: NEUROLOGY | Facility: CLINIC | Age: 72
End: 2022-10-13

## 2022-10-13 ENCOUNTER — OFFICE VISIT (OUTPATIENT)
Dept: NEUROLOGY | Facility: CLINIC | Age: 72
End: 2022-10-13
Payer: COMMERCIAL

## 2022-10-13 VITALS
RESPIRATION RATE: 16 BRPM | WEIGHT: 176 LBS | OXYGEN SATURATION: 94 % | HEART RATE: 66 BPM | DIASTOLIC BLOOD PRESSURE: 74 MMHG | BODY MASS INDEX: 28.41 KG/M2 | SYSTOLIC BLOOD PRESSURE: 111 MMHG

## 2022-10-13 DIAGNOSIS — G12.21 ALS (AMYOTROPHIC LATERAL SCLEROSIS) (H): Primary | ICD-10-CM

## 2022-10-13 DIAGNOSIS — G12.21 BRACHIAL AMYOTROPHIC DIPLEGIA (H): ICD-10-CM

## 2022-10-13 PROCEDURE — 94375 RESPIRATORY FLOW VOLUME LOOP: CPT | Performed by: INTERNAL MEDICINE

## 2022-10-13 PROCEDURE — 99214 OFFICE O/P EST MOD 30 MIN: CPT | Performed by: PSYCHIATRY & NEUROLOGY

## 2022-10-13 PROCEDURE — 94799 UNLISTED PULMONARY SVC/PX: CPT | Performed by: INTERNAL MEDICINE

## 2022-10-13 NOTE — PROGRESS NOTES
CLINICAL NUTRITION SERVICES - REASSESSMENT NOTE     EVALUATION OF PREVIOUS PLAN OF CARE:   Referring Physician: Diana  Current diet: Regular  Current appetite/intake: Good appetite/intakes, three meals provided by facility  PEG Tube: No    Monitoring from previous assessment:   -Food intake - Three meals daily, sometimes a snack.   -Fluid/beverage intake -   -Liquid meal replacement or supplement - Sometimes has magic cup or other supplement, not consistently  -Weight trends - 5 lb weight loss over last 3-4 months, 25 lb weight loss   10/13/22 79.8 kg (176 lb)   06/23/22 82.1 kg (181 lb)   12/09/21 91.2 kg (201 lb)   10/29/20 97.5 kg (215 lb)   09/17/20 98.4 kg (216 lb 14.4 oz)     Previous Goals:   1.  Aim for 5-6 small frequent meals  2.  Aim for 2070 kcal and 70 g protein/day  3. Weight maintenance  Evaluation: Does not have 5-6 meals, has lost weight, unsure of caloric intakes.     Previous Nutrition Diagnosis:   Inadequate oral intake related to feeding difficulties and increases needs associated with ALS as evidenced by patient report and 20# (10%) wt loss over past 6 months  Evaluation: No change     NEW FINDINGS:   Patient states there is a dietitian that works in his facility who is in charge of the kitchen but also meets with patients to assure adequate nutrition. Patient states there are three different diets, which seem based on patient's description to be texturally modified diets. Patient reports worsening dysphagia with foods that have particulates such as hamburger and meat loaf. Patient has been moistening foods such as grilled cheese to make them less difficult to swallow. Drinks water almost every bite with those meals which does help. Has no difficulty with liquids at the moment. Patient believes that he could lose more weight as he was overweight when he first was diagnosed and believes that muscle loss in inevitable in this disease without exercising regardless of protein intakes.  Encouraged patient that protein intake will help to slow muscle loss and that weight loss is not recommended at this time. Encouraged patient to have more snacks throughout the day. Patient states that he has had magic cup in the past but is getting bored of it and other snacks/foods. Pt unsure of what writer can do for him. Provided with explanation of role of RD in this clinic.     CURRENT NUTRITION DIAGNOSIS   Predicted inadequate nutrient intake related to swallowing difficulties and varied intakes and increased needs associated with ALS as evidenced by patient report and weight loss of 5 lb in 3-4 months and overall 25 lb over one year.     INTERVENTIONS   Recommendations / Nutrition Prescription   Continue regular diet  Patient should include 1-2 oral nutrition supplements per ysabel    Implementation  General/healthful diet and Medical Food Supplement     Goals   1.  Aim for 5-6 small frequent meals  2.  Aim for 2070 kcal and 70 g protein/day  3. Weight maintenance    Follow up/Monitoring:   Encouraged patient to follow up with RD at facility    Progress towards goals will be monitored and evaluated per protocol and Practice Guidelines    Mayra Awan MS, RDN, LDN

## 2022-10-13 NOTE — PROGRESS NOTES
Service Date: 10/13/2022    HISTORY OF PRESENT ILLNESS:  I had the pleasure to see Mr. John Dunphy in followup at the [facility].  He is a 71-year-old man with flail arm syndrome (brachial amyotrophic diplegia). Onset of symptoms of arm weakness was in 05/2018. Over the years, however, he did develop leg weakness, neck flexion and extension weakness and dysphagia qualifying for a diagnosis of ALS.      He lives in a nursing home, where he now gets PT, OT and speech therapy services regularly. He is on riluzole 50 mg b.i.d.  He is tolerating it well.  Last liver function tests were done a long time ago in 03/2021 and were normal.  He was not placed on Radicava because I did not feel that the drug would be beneficial.  He has a history of obstructive sleep apnea, for which he is on a CPAP. I had recommended switching from CPAP to BiPAP because in his last visit with me in 06/2022, his MIP was -59, indicating diaphragmatic weakness and likely impending neuromuscular respiratory failure related to ALS, for which the appropriate mode of ventilation is NIV/BiPAP; however, his HMO plan declined it.      He has lost a further 5 pounds of weight from the last time; he was 200 last year, 180 in June, 176 now. He continues with severe head drop.  He generally cannot tolerate the head collars.  He has tried different ones, including soft and rigid ones, and none are particularly comfortable, and he cannot eat with them.  He is having a little more dysphagia.  It is more small particles of food such as scrambled eggs or bread that give him the most trouble.  They get stuck, and he may cough them.  He does not have a problem with larger particles or with pure liquids like water, juice or soup.  He denies sialorrhea or accumulation of thick secretions. He pays a lot of attention to his speech, and he thinks at times he may get a little slurred, but other people do not report  it, and his voice and speech sound clear to me today.  His breathing function is about the same.      He has not had any falls in the last 6 months.  He can still get up from a toilet seat or a high chair on his own; otherwise, he needs assistance.  His arms are completely paralyzed.  He is noticing, however, increasing leg weakness and more unsteadiness when transferred and ambulating, lately.     MEDICATIONS:  Reviewed and are as per Epic record.    /74   Pulse 66   Resp 16   Wt 79.8 kg (176 lb)   SpO2 94%   BMI 28.41 kg/m      PFT Latest Ref Rng & Units 10/13/2022   FVC L 2.24   FEV1 L 1.82   FVC% % 61   FEV1% % 65      MIP -52 cm H2O(low).    PHYSICAL EXAMINATION:  On a brief physical exam, his hip flexion strength is 4- bilaterally.  Foot dorsiflexion is a bit worse than the last visit; it is 3 on the right or less and 4 on the left.  Knee extension and flexion are full.  His arms are plegic.  He has no weakness of orbicularis oculi or oris.  Tongue shows no atrophy or fasciculations and strong lateral movements.  Uvula and palate elevate at midline.  There is no dysphonia, dysarthria or tongue fasciculations.    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  We discussed a number of practical issues.  He will continue riluzole 50 mg b.i.d.  We will check AST and ALT today.  We discussed a newly FDA-approved drug for ALS, Relyvrio.  Despite some ongoing uncertainty about its efficacy and despite the fact it was not studied in patients with disease duration more than 18 months as Mr. Dunphy, he is still willing to try it, and we will have him sign the start-up form.  He will return to clinic for followup in 4 months.  He will continue receiving PT, OT and speech services at his nursing home, and he should be evaluated by a dietitian there because he has some ongoing weight loss.    ATTESTATION:Non invasive ventilation with BiPAP or AVAPs  is medically necessary for Mr Dunphy, following today's face to face evaluation for this purpose, because of ALS causing diaphragmatic weakness, manifesting with low MIP values of -52 as stated above, and patient being at risk of nocturnal hypoventilation and respiratory failure secondary to the same diagnosis. CPAP is NOT an appropriate mode of ventilation for patients with restrictive respiratory physiology and weak diaphragm muscle, and must be changed to BiPAP to prevent atelectasis/lung collapse at night. Patients with ALS meeting spirometric criteria for NIV, including absolute values of MIP less than 60 cm H2O, and/or FVC less than 50% of normal, do NOT need an overnight sleep study to qualify for NIV.     Billing MDM level 4 (moderate) based on 1) problems assessed: One chronic disorder with progression, exacerbation, or side effects of treatment (ALS) and 2) Risk: prescription drug management (on riluzole, monitoring LFT, starting a new drug Relyvrio).     Taras Ortiz MD        D: 10/13/2022   T: 10/13/2022   MT: alison    Name:     DUNPHY, JOHN C.  MRN:      [id number]        Account:      [id number]   :      1950           Service Date: 10/13/2022       Document: [id number]

## 2022-10-13 NOTE — LETTER
10/13/2022       RE: John C Dunphy  [address]  [facility] [address]     Dear Colleague,    Thank you for referring your patient, John C Dunphy, to the [facility] NEUROLOGY CLINIC [place] at [facility]. Please see a copy of my visit note below.    Service Date: 10/13/2022    HISTORY OF PRESENT ILLNESS:  I had the pleasure to see Mr. John Dunphy in followup at the [facility].  He is a 71-year-old man with flail arm syndrome (brachial amyotrophic diplegia). Onset of symptoms of arm weakness was in 05/2018. Over the years, however, he did develop leg weakness, neck flexion and extension weakness and dysphagia qualifying for a diagnosis of ALS.      He lives in a nursing home, where he now gets PT, OT and speech therapy services regularly. He is on riluzole 50 mg b.i.d.  He is tolerating it well.  Last liver function tests were done a long time ago in 03/2021 and were normal.  He was not placed on Radicava because I did not feel that the drug would be beneficial.  He has a history of obstructive sleep apnea, for which he is on a CPAP. I had recommended switching from CPAP to BiPAP because in his last visit with me in 06/2022, his MIP was -59, indicating diaphragmatic weakness and likely impending neuromuscular respiratory failure related to ALS, for which the appropriate mode of ventilation is NIV/BiPAP; however, his HMO plan declined it.      He has lost a further 5 pounds of weight from the last time; he was 200 last year, 180 in June, 176 now. He continues with severe head drop.  He generally cannot tolerate the head collars.  He has tried different ones, including soft and rigid ones, and none are particularly comfortable, and he cannot eat with them.  He is having a little more dysphagia.  It is more small particles of food such as scrambled eggs or bread that give him the most trouble.  They get  stuck, and he may cough them.  He does not have a problem with larger particles or with pure liquids like water, juice or soup.  He denies sialorrhea or accumulation of thick secretions. He pays a lot of attention to his speech, and he thinks at times he may get a little slurred, but other people do not report it, and his voice and speech sound clear to me today.  His breathing function is about the same.      He has not had any falls in the last 6 months.  He can still get up from a toilet seat or a high chair on his own; otherwise, he needs assistance.  His arms are completely paralyzed.  He is noticing, however, increasing leg weakness and more unsteadiness when transferred and ambulating, lately.     MEDICATIONS:  Reviewed and are as per Epic record.    /74   Pulse 66   Resp 16   Wt 79.8 kg (176 lb)   SpO2 94%   BMI 28.41 kg/m      PFT Latest Ref Rng & Units 10/13/2022   FVC L 2.24   FEV1 L 1.82   FVC% % 61   FEV1% % 65      MIP -52 cm H2O(low).    PHYSICAL EXAMINATION:  On a brief physical exam, his hip flexion strength is 4- bilaterally.  Foot dorsiflexion is a bit worse than the last visit; it is 3 on the right or less and 4 on the left.  Knee extension and flexion are full.  His arms are plegic.  He has no weakness of orbicularis oculi or oris.  Tongue shows no atrophy or fasciculations and strong lateral movements.  Uvula and palate elevate at midline.  There is no dysphonia, dysarthria or tongue fasciculations.    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  We discussed a number of practical issues.  He will continue riluzole 50 mg b.i.d.  We will check AST and ALT today.  We discussed a newly FDA-approved drug for ALS, Relyvrio.  Despite some ongoing uncertainty about its efficacy and despite the fact it was not studied in patients with disease duration more than 18 months as Mr. Dunphy, he is still willing to try it, and we will  have him sign the start-up form.  He will return to clinic for followup in 4 months.  He will continue receiving PT, OT and speech services at his nursing home, and he should be evaluated by a dietitian there because he has some ongoing weight loss.    ATTESTATION:Non invasive ventilation with BiPAP or AVAPs is medically necessary for Mr Dunphy, following today's face to face evaluation for this purpose, because of ALS causing diaphragmatic weakness, manifesting with low MIP values of -52 as stated above, and patient being at risk of nocturnal hypoventilation and respiratory failure secondary to the same diagnosis. CPAP is NOT an appropriate mode of ventilation for patients with restrictive respiratory physiology and weak diaphragm muscle, and must be changed to BiPAP to prevent atelectasis/lung collapse at night. Patients with ALS meeting spirometric criteria for NIV, including absolute values of MIP less than 60 cm H2O, and/or FVC less than 50% of normal, do NOT need an overnight sleep study to qualify for NIV.     Billing MDM level 4 (moderate) based on 1) problems assessed: One chronic disorder with progression, exacerbation, or side effects of treatment (ALS) and 2) Risk: prescription drug management (on riluzole, monitoring LFT, starting a new drug Relyvrio).       D: 10/13/2022   T: 10/13/2022   MT: alison    Name:     DUNPHY, JOHN C.  MRN:      [id number]        Account:      [id number]   :      1950           Service Date: 10/13/2022       Document: [id number]      Again, thank you for allowing me to participate in the care of your patient.      Sincerely,    Taras Ortiz MD

## 2022-10-13 NOTE — PATIENT INSTRUCTIONS
Will have you sign (verbal approval ok) the Relyvrio start up form (the new FDA approved ALS drug).  Blood test today for liver function    Please find last clinic note, breathing test (PFT) and BiPAP order from your last visit, attached.Your diagnosis and PFT results qualify you for BiPAP. A sleep study is not needed for our patients.  You should cancel the Oct 19th Sleep Doctor appt and contact us to pursue the BiPAP if the facility cannot get one approved for you.    4 month follow up    Please call Charo @ [phone] for questions or concerns during regular business hours. For a more efficient way to communicate, use [employer] and address the message to your physician. Remember, [employer] [facility] is only read during business hours. Do not leave urgent messages on voicemail or [employer] If situation is urgent, contact the Neurology Clinic @ [phone] and ask to speak to a Triage Nurse or Call 911 or visit an Emergency Department.    Please call your pharmacy if you need a medication refill. They will send us an electronic message.

## 2022-10-18 LAB
EXPTIME-PRE: 5.92 SEC
FEF2575-%PRED-PRE: 86 %
FEF2575-PRE: 1.85 L/SEC
FEF2575-PRED: 2.14 L/SEC
FEFMAX-%PRED-PRE: 59 %
FEFMAX-PRE: 4.34 L/SEC
FEFMAX-PRED: 7.28 L/SEC
FEV1-%PRED-PRE: 65 %
FEV1-PRE: 1.82 L
FEV1FEV6-PRE: 81 %
FEV1FEV6-PRED: 77 %
FEV1FVC-PRE: 81 %
FEV1FVC-PRED: 76 %
FIFMAX-PRE: 2.38 L/SEC
FVC-%PRED-PRE: 61 %
FVC-PRE: 2.24 L
FVC-PRED: 3.62 L
MEP-PRE: 53 CMH2O
MIP-PRE: -52 CMH2O
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
## 2023-01-01 ENCOUNTER — OFFICE VISIT (OUTPATIENT)
Dept: NEUROLOGY | Facility: CLINIC | Age: 73
End: 2023-01-01
Payer: COMMERCIAL

## 2023-01-01 ENCOUNTER — TELEPHONE (OUTPATIENT)
Dept: NEUROLOGY | Facility: CLINIC | Age: 73
End: 2023-01-01
Payer: COMMERCIAL

## 2023-01-01 ENCOUNTER — TELEPHONE (OUTPATIENT)
Dept: NEUROLOGY | Facility: CLINIC | Age: 73
End: 2023-01-01

## 2023-01-01 ENCOUNTER — LAB (OUTPATIENT)
Dept: LAB | Facility: CLINIC | Age: 73
End: 2023-01-01
Payer: COMMERCIAL

## 2023-01-01 ENCOUNTER — ALLIED HEALTH/NURSE VISIT (OUTPATIENT)
Dept: NEUROLOGY | Facility: CLINIC | Age: 73
End: 2023-01-01

## 2023-01-01 ENCOUNTER — PATIENT OUTREACH (OUTPATIENT)
Dept: CARE COORDINATION | Facility: CLINIC | Age: 73
End: 2023-01-01

## 2023-01-01 ENCOUNTER — MEDICAL CORRESPONDENCE (OUTPATIENT)
Dept: HEALTH INFORMATION MANAGEMENT | Facility: CLINIC | Age: 73
End: 2023-01-01
Payer: COMMERCIAL

## 2023-01-01 VITALS
OXYGEN SATURATION: 95 % | WEIGHT: 150 LBS | SYSTOLIC BLOOD PRESSURE: 93 MMHG | DIASTOLIC BLOOD PRESSURE: 59 MMHG | HEART RATE: 63 BPM | BODY MASS INDEX: 24.21 KG/M2 | RESPIRATION RATE: 16 BRPM

## 2023-01-01 VITALS
OXYGEN SATURATION: 94 % | DIASTOLIC BLOOD PRESSURE: 75 MMHG | BODY MASS INDEX: 25.82 KG/M2 | SYSTOLIC BLOOD PRESSURE: 121 MMHG | HEART RATE: 55 BPM | RESPIRATION RATE: 16 BRPM | WEIGHT: 160 LBS

## 2023-01-01 DIAGNOSIS — G12.21 ALS (AMYOTROPHIC LATERAL SCLEROSIS) (H): Primary | ICD-10-CM

## 2023-01-01 DIAGNOSIS — G12.21 ALS (AMYOTROPHIC LATERAL SCLEROSIS) (H): ICD-10-CM

## 2023-01-01 LAB
EXPTIME-PRE: 2.54 SEC
EXPTIME-PRE: 6.76 SEC
FEF2575-%PRED-PRE: 119 %
FEF2575-%PRED-PRE: 85 %
FEF2575-PRE: 1.69 L/SEC
FEF2575-PRE: 2.51 L/SEC
FEF2575-PRED: 1.99 L/SEC
FEF2575-PRED: 2.09 L/SEC
FEFMAX-%PRED-PRE: 33 %
FEFMAX-%PRED-PRE: 50 %
FEFMAX-PRE: 2.41 L/SEC
FEFMAX-PRE: 3.65 L/SEC
FEFMAX-PRED: 7.17 L/SEC
FEFMAX-PRED: 7.17 L/SEC
FEV1-%PRED-PRE: 51 %
FEV1-%PRED-PRE: 66 %
FEV1-PRE: 1.32 L
FEV1-PRE: 1.8 L
FEV1FEV6-PRE: 86 %
FEV1FEV6-PRE: 92 %
FEV1FEV6-PRED: 77 %
FEV1FEV6-PRED: 77 %
FEV1FVC-PRE: 86 %
FEV1FVC-PRE: 93 %
FEV1FVC-PRED: 76 %
FEV1FVC-PRED: 77 %
FIFMAX-PRE: 1.47 L/SEC
FIFMAX-PRE: 2.15 L/SEC
FVC-%PRED-PRE: 46 %
FVC-%PRED-PRE: 54 %
FVC-PRE: 1.53 L
FVC-PRE: 1.95 L
FVC-PRED: 3.31 L
FVC-PRED: 3.59 L
MEP-PRE: 25 CMH2O
MEP-PRE: 37 CMH2O
MIP-PRE: -45 CMH2O
MIP-PRE: -53 CMH2O
SCANNED LAB RESULT: NORMAL

## 2023-01-01 PROCEDURE — 99207 PR BUNDLED PROCEDURE IN GLOBAL PKG: CPT | Performed by: GENETIC COUNSELOR, MS

## 2023-01-01 PROCEDURE — 94375 RESPIRATORY FLOW VOLUME LOOP: CPT | Performed by: INTERNAL MEDICINE

## 2023-01-01 PROCEDURE — 99214 OFFICE O/P EST MOD 30 MIN: CPT | Performed by: PSYCHIATRY & NEUROLOGY

## 2023-01-01 PROCEDURE — 99215 OFFICE O/P EST HI 40 MIN: CPT | Performed by: PSYCHIATRY & NEUROLOGY

## 2023-01-01 PROCEDURE — 99000 SPECIMEN HANDLING OFFICE-LAB: CPT | Performed by: PATHOLOGY

## 2023-01-01 PROCEDURE — 94799 UNLISTED PULMONARY SVC/PX: CPT | Performed by: INTERNAL MEDICINE

## 2023-01-01 PROCEDURE — 36415 COLL VENOUS BLD VENIPUNCTURE: CPT | Performed by: PATHOLOGY

## 2023-01-01 RX ORDER — FLUTICASONE PROPIONATE 50 MCG
1 SPRAY, SUSPENSION (ML) NASAL DAILY
COMMUNITY

## 2023-01-01 RX ORDER — CETIRIZINE HYDROCHLORIDE 10 MG/1
10 TABLET, CHEWABLE ORAL DAILY
COMMUNITY

## 2023-01-01 RX ORDER — TAMSULOSIN HYDROCHLORIDE 0.4 MG/1
0.4 CAPSULE ORAL DAILY
COMMUNITY

## 2023-01-01 RX ORDER — AMOXICILLIN 250 MG
1 CAPSULE ORAL DAILY
COMMUNITY

## 2023-01-01 RX ORDER — FINASTERIDE 5 MG/1
5 TABLET, FILM COATED ORAL DAILY
COMMUNITY

## 2023-01-01 RX ORDER — BISACODYL 10 MG
10 SUPPOSITORY, RECTAL RECTAL DAILY PRN
COMMUNITY

## 2023-01-01 ASSESSMENT — PAIN SCALES - GENERAL
PAINLEVEL: NO PAIN (0)
PAINLEVEL: NO PAIN (0)

## 2023-02-07 NOTE — NURSING NOTE
Call to nursing home. RN reported pt is now assist of 2 for transfers, modified diet due to swallow. Pt has 2-3 neck collars-declines to wear them. Using CPAP at night-oxygen bled in. BiPAP orders were not filled as DME company continues to insist sleep study is needed.  Will re-order BiPAP and send order, clinic note and today's PFT directly to DME company.

## 2023-02-09 NOTE — NURSING NOTE
"Call from facility SLP. Pt is on modified diet has routine bedside swallow studies and is seen weekly by SLP. FEES ordered but patient declined day of appt.  Using \"chin tuck\" as chin is natrually at chest so does not wear collar when eating.    "

## 2023-02-09 NOTE — LETTER
Date:February 9, 2023      Provider requested that no letter be sent. Do not send.       [facility]

## 2023-02-09 NOTE — PATIENT INSTRUCTIONS
Consider the option of feeding tube placement to augment your nutrition. It is more beneficial if done earlier in the course of ALS than later  Please talk to your speech therapist about the possible aspiration events  Continue riluzole, I will have Charo call you about the Relyvrio forms that she sent  We will work with your respiratory company on the BiPAP  POLST form    3 month follow up                   Please call Charo @ [phone] for questions or concerns during regular business hours. For a more efficient way to communicate, use [employer] and address the message to your physician. Remember, [employer] is only read during business hours. Do not leave urgent messages on [employer] or [employer]. If situation is urgent, contact the Neurology Clinic @ [phone] and ask to speak to a Triage Nurse or Call 911 or visit an Emergency Department.    Please call your pharmacy if you need a medication refill. They will send us an electronic message.

## 2023-02-09 NOTE — LETTER
2/9/2023       RE: John C Dunphy  [address]  [facility] [address]     Dear Colleague,    Thank you for referring your patient, John C Dunphy, to the [facility] NEUROLOGY CLINIC [place] at [facility]. Please see a copy of my visit note below.    Service Date: 02/09/2023    PRIMARY CARE PROVIDER:  Not listed.    HISTORY OF PRESENT ILLNESS:  I had the pleasure to see Mr. Dunphy in followup at the [facility] today.  He is a 72-year-old man who presented with flail arm syndrome, or brachial amyotrophic diplegia, with onset of arm weakness in 05/2018.  Over the years, however, he did develop leg weakness, neck weakness and dysphagia qualifying for a diagnosis of ALS.  He lives now in a nursing home.  He gets PT, OT and speech therapy there.  He is on riluzole 50 mg b.i.d.  I did not put him on Radicava because I did not feel that the drug would be beneficial in so advanced disease stage.  Recently, however, Relyvrio was FDA approved, and while it is not clear if this drug either will have significant benefit on him at this stage, exactly because of the uncertainties and limited data, I decided to offer it to him.  We sent him a form, but he never returned it so that we can investigate his insurance benefits.      Mr. Dunphy is also on CPAP for obstructive sleep apnea.  We have explained numerous times to his insurance and outlined in our notes that he needs BiPAP because his MIP was declining, indicating diaphragmatic weakness and impending neuromuscular respiratory failure from ALS.  However, his HMO plan had declined it.      He continues to lose weight from last year.  He has a severe head drop and cannot tolerate any of multiple head collars that were already tried on him.  He is having a bit more dysphagia in the last 3 months.  He has occasional choking of smaller pieces of food and  liquids as well.  He had not thought about a feeding tube to date. He denies sialorrhea or accumulation of thick secretions.  He does not have overt dysarthria.      He did have 2 falls in the last 4 months.  He cannot get up from a toilet seat or high chair on his own anymore; now, he needs assistance by 2 people usually.  He takes a few steps from the bedroom to the bathroom or inside his bedroom usually with supervision.    Medications were reviewed and are as per Epic record.      PFT Latest Ref Rng & Units 2/9/2023   FVC L 1.95   FEV1 L 1.80   FVC% % 54   FEV1% % 66      PFTs are attached and his MIP is -53 cm of water, which is low, indicating diaphragmatic weakness from ALS.    /75   Pulse 55   Resp 16   Wt 72.6 kg (160 lb)   SpO2 94%   BMI 25.82 kg/m      Neuro exam not repeated.     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  We discussed a number of practical issues.  He will continue riluzole 50 mg b.i.d.  I am not checking AST and ALT again today, as he has been on the drug for a long time and they were checked in 10/2022 and were both normal, ALT 29 and AST 24.  We will give him another form for Relyvrio, as he is interested in the drug, and I explained this to him again.     His increasing weakness will be addressed by Physical and Occupational Therapy at his nursing home.  It is important that he talks to his speech therapist because he has noticed some episodes of aspiration that are increasing in frequency.  He may need further diet modification, but frankly, I think is an appropriate time to discuss gastrostomy placement.  I went over the procedure in detail with the patient, its indications and the fact that gastrostomy is more likely to benefit survival in ALS if done earlier during the disease course.  He will think about it.  He is capable of laying flat for 20-30 minutes, and that would not be a limitation.       Importantly, Mr. Dunphy needs noninvasive ventilation with BiPAP or AVAPS.  It is medically necessary following today's face-to-face evaluation.  He has clear evidence of diaphragmatic weakness related to ALS manifesting with low MIP values of -53 cm H2O, and declining FVC values, which is now 54% of normal.  He is at risk of nocturnal hypoventilation and respiratory failure secondary to the same diagnosis. CPAP is NOT an appropriate mode of ventilation for patients with this problem and must be changed to BiPAP to prevent atelectasis/lung collapse at night.  Patients with ALS meeting spirometric criteria for NIV including absolute values of MIP less than 60 cm water and/or FVC less than 50% of normal do NOT need an overnight sleep study to qualify for NIV.      He will return to clinic for followup in 3 months or sooner if needed.  We discussed again his advanced directives, and he will complete a POLST form today.  He is leaning towards comfort care and does not want to be intubated or resuscitated in the setting of a terminal event.  He would like care to be delivered in his nursing home, including oral medications and fluids, as opposed to ED visits or hospital stays, but he has not figured out his preference about gastrostomy yet.      Follow up as above. TT spent on this encounter today 40 minutes of which 20 face to face, 10 in post visit note dictation, editing, and orders, and 10 in pre-visit chart review.     Taras Ortiz MD        D: 2023   T: 2023   MT: ll    Name:     DUNPHY, JOHN C.  MRN:      [id number]        Account:      [id number]   :      1950           Service Date: 2023       Document: [id number]      Again, thank you for allowing me to participate in the care of your patient.      Sincerely,    Taras Ortiz MD

## 2023-02-09 NOTE — PROGRESS NOTES
"Social Work -ALS Clinic Progress Note  [facility]    Data/Intervention:    Patient Name:  John C Dunphy  /Age:  1950 (72 year old)    Visit Type: in person    Collaborated With:    -Keyon and his RT from [facility]  -Dr Ortiz and members of the ALS interdisciplinary team    Psychosocial info/Concerns:   Dr Ortiz addressed the POLST with Keyon during visit and completed the document. Pt and his RT reported that he has a POLST already at the facility. Since it was completed, they wanted to take it back to the facility with them. Pt was not able to sign it due to non use of his arms/hands.  He has no family in the area. His siblings live out of state. He doesn't have a spouse or children. He is on MA which covers his care at the facility. He worked in several difficult types of jobs but stated that he never found his \"calling\". He likes to play chess and would like to have someone to play with.     Intervention/Education/Resources Provided:  Reviewed that the [facility] has volunteers and he agreed that I could request a volunteer that may be able to play chess with him or even just visit has he has no visitors. He does face time with his family.     Assessment/Plan:  Pt is adjusted to life at the nursing home. Would benefit from a volunteer if available. Will reach out to ALS to request.     Provided patient/family with contact information and encouraged them to contact me between clinic visits as needed.     Ann-Marie Christian, BLUE, [facility]    [facility]  [phone]        "

## 2023-02-09 NOTE — PROGRESS NOTES
Service Date: 02/09/2023    PRIMARY CARE PROVIDER:  Not listed.    HISTORY OF PRESENT ILLNESS:  I had the pleasure to see Mr. Dunphy in followup at the [facility] today.  He is a 72-year-old man who presented with flail arm syndrome, or brachial amyotrophic diplegia, with onset of arm weakness in 05/2018.  Over the years, however, he did develop leg weakness, neck weakness and dysphagia qualifying for a diagnosis of ALS.  He lives now in a nursing home.  He gets PT, OT and speech therapy there.  He is on riluzole 50 mg b.i.d.  I did not put him on Radicava because I did not feel that the drug would be beneficial in so advanced disease stage.  Recently, however, Relyvrio was FDA approved, and while it is not clear if this drug either will have significant benefit on him at this stage, exactly because of the uncertainties and limited data, I decided to offer it to him.  We sent him a form, but he never returned it so that we can investigate his insurance benefits.      Mr. Dunphy is also on CPAP for obstructive sleep apnea.  We have explained numerous times to his insurance and outlined in our notes that he needs BiPAP because his MIP was declining, indicating diaphragmatic weakness and impending neuromuscular respiratory failure from ALS.  However, his HMO plan had declined it.      He continues to lose weight from last year.  He has a severe head drop and cannot tolerate any of multiple head collars that were already tried on him.  He is having a bit more dysphagia in the last 3 months.  He has occasional choking of smaller pieces of food and liquids as well.  He had not thought about a feeding tube to date. He denies sialorrhea or accumulation of thick secretions.  He does not have overt dysarthria.      He did have 2 falls in the last 4 months.  He cannot get up from a toilet seat or high chair on his own anymore; now, he needs assistance by 2  people usually.  He takes a few steps from the bedroom to the bathroom or inside his bedroom usually with supervision.    Medications were reviewed and are as per Epic record.      PFT Latest Ref Rng & Units 2/9/2023   FVC L 1.95   FEV1 L 1.80   FVC% % 54   FEV1% % 66      PFTs are attached and his MIP is -53 cm of water, which is low, indicating diaphragmatic weakness from ALS.    /75   Pulse 55   Resp 16   Wt 72.6 kg (160 lb)   SpO2 94%   BMI 25.82 kg/m      Neuro exam not repeated.     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  We discussed a number of practical issues.  He will continue riluzole 50 mg b.i.d.  I am not checking AST and ALT again today, as he has been on the drug for a long time and they were checked in 10/2022 and were both normal, ALT 29 and AST 24.  We will give him another form for Relyvrio, as he is interested in the drug, and I explained this to him again.     His increasing weakness will be addressed by Physical and Occupational Therapy at his nursing home.  It is important that he talks to his speech therapist because he has noticed some episodes of aspiration that are increasing in frequency.  He may need further diet modification, but frankly, I think is an appropriate time to discuss gastrostomy placement.  I went over the procedure in detail with the patient, its indications and the fact that gastrostomy is more likely to benefit survival in ALS if done earlier during the disease course.  He will think about it.  He is capable of laying flat for 20-30 minutes, and that would not be a limitation.      Importantly, Mr. Dunphy needs noninvasive ventilation with BiPAP or AVAPS.  It is medically necessary following today's face-to-face evaluation.  He has clear evidence of diaphragmatic weakness related to ALS manifesting with low MIP values of -53 cm H2O, and declining FVC values, which is now 54% of normal.   He is at risk of nocturnal hypoventilation and respiratory failure secondary to the same diagnosis. CPAP is NOT an appropriate mode of ventilation for patients with this problem and must be changed to BiPAP to prevent atelectasis/lung collapse at night.  Patients with ALS meeting spirometric criteria for NIV including absolute values of MIP less than 60 cm water and/or FVC less than 50% of normal do NOT need an overnight sleep study to qualify for NIV.      He will return to clinic for followup in 3 months or sooner if needed.  We discussed again his advanced directives, and he will complete a POLST form today.  He is leaning towards comfort care and does not want to be intubated or resuscitated in the setting of a terminal event.  He would like care to be delivered in his nursing home, including oral medications and fluids, as opposed to ED visits or hospital stays, but he has not figured out his preference about gastrostomy yet.      Follow up as above. TT spent on this encounter today 40 minutes of which 20 face to face, 10 in post visit note dictation, editing, and orders, and 10 in pre-visit chart review.     Taras Ortiz MD        D: 2023   T: 2023   MT: irasema    Name:     DUNPHY, JOHN C.  MRN:      [id number]        Account:      [id number]   :      1950           Service Date: 2023       Document: [id number]

## 2023-03-09 NOTE — TELEPHONE ENCOUNTER
PA Initiation    Medication: Relyvrio PA - pending    Insurance Company: [employer] - Phone [phone] Fax [phone]  Pharmacy Filling the Rx: [facility] - [address] [place] [address]  Filling Pharmacy Phone:    Filling Pharmacy Fax:    Start Date: 3/9/2023    [id number]

## 2023-03-09 NOTE — TELEPHONE ENCOUNTER
Prior Authorization Approval    Authorization Effective Date: 2/7/2023  Authorization Expiration Date: 3/8/2024  Medication: Roni PA - Approved  Approved Dose/Quantity: 56 packets per 28 days  Reference #: [id number]   Insurance Company: [employer] - Phone [phone] Fax [phone]  Expected CoPay:       CoPay Card Available:      Foundation Assistance Needed:    Which Pharmacy is filling the prescription (Not needed for infusion/clinic administered): [address]  Pharmacy Notified: Yes  Patient Notified: Yes

## 2023-03-23 NOTE — TELEPHONE ENCOUNTER
[facility] Call Center    Phone Message    May a detailed message be left on voicemail: yes     Reason for Call: Other: Claudia from [facility] is calling because she is wanting to talk to [facility] sent in a Application for the medication trial for relyvio and they are wanting to know the status on this application. Please call [facility] to discuss    Action Taken: Message routed to:  [facility] ([facility] Neurology    Travel Screening: Not Applicable

## 2023-05-11 NOTE — PATIENT INSTRUCTIONS
Continue riluzole for now.    Please ask a respiratory therapist at your nursing home to adjust your BiPAP machine so it fits better in your face and its not terribly tight.  You should use BiPAP at night and not CPAP; CPAP is not appropriate for respiratory dysfunction with ALS.    We are not proceeding with stomach feeding tube placement at this point.    I would ask that the physical therapist that your nursing home instruct the nursing assistance to do range of motion exercises at least twice a day to prevent contractions.    You will see our genetic counselor today.  You did have a genetic testing for ALS called SOD 1 year ago, which was negative.  However there are other ALS related genes, and if you and your family are interested, we may end up doing additional testing.    Follow-up in 3 months    Medical Records...[phone]    Please call [facility] @ [phone] for questions or concerns during regular business hours. For a more efficient way to communicate, use LikeList and address the message to your physician. Remember, GuideITt is only read during business hours. Do not leave urgent messages on voicemail or LikeList. If situation is urgent, contact the Neurology Clinic @ [phone] and ask to speak to a Triage Nurse or Call 911 or visit an Emergency Department.    Please call your pharmacy if you need a medication refill. They will send us an electronic message.

## 2023-05-11 NOTE — PROGRESS NOTES
CLINICAL NUTRITION SERVICES - BRIEF NOTE    Reason for RD note: Brief discussion with pt's brother and sister    Attempted to meet with patient, but he had already left to go for a lab appointment as he was becoming quite fatigued from meeting with a number of providers. Met with pt's brother and sister to discuss pt's recent weight loss. Pt lives in a long term care facility where all meals are provided to him. He selects his meals from a menu. All food is delivered finely chopped or pureed. He uses a straw to consume his cream of wheat. He drinks thickened liquids. Encouraged family to request 2 ONS per day for patient to prevent further weight loss. Pt is not interested in PEG placement.     10# wt loss over the past three months.   Wt Readings from Last 10 Encounters:   05/11/23 68 kg (150 lb)   02/09/23 72.6 kg (160 lb)   10/13/22 79.8 kg (176 lb)   06/23/22 82.1 kg (181 lb)   12/09/21 91.2 kg (201 lb)   10/29/20 97.5 kg (215 lb)   09/17/20 98.4 kg (216 lb 14.4 oz)     Nutrition will continue to follow per protocol.     Marleny Stewart, RD, LD

## 2023-05-11 NOTE — LETTER
"5/11/2023       RE: John C Dunphy  [address] [facility] [address]       Dear Colleague,    Thank you for referring your patient, John C Dunphy, to the [facility] [place] [facility]. Please see a copy of my visit note below.    Date of Service: 05/11/2023    I had the pleasure to see John Dunphy at the [facility] today. Keyon was escorted by his 2 siblings (brother Dane and sister Marleny), and his caregiver at the nursing home.  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  Gradually his disease progressed, and now he clearly meets criteria for widespread ALS as he has weakness of his bulbar region, lower extremities and trunk as well.  At this point he does not get much out of his wheelchair and needs to be transferred by the nursing home staff.  His upper extremity function is essentially 0 (plegic).  He cannot hold a pen or sign.  He is acknowledging gradually more dysphagia from the last visit.  He had a barium swallow study at his nursing home, which led to change of his diet to thickened liquids.  He has trouble with small particles of food which he chokes on not rarely.  He does have a speech therapist, and is following her instructions at the nursing home.  He has felt that his lungs are more \"moist\" after eating, which could mean that he is exhibiting silent aspiration at times.  He has minor drooling but he does not think it is a major problem.  He denies pseudobulbar affect or issues with thick phlegm.  He is not dysarthric.  He has lost further weight from the last visit, unclear how much.    Because his pulmonary function tests previously indicated ventilatory dysfunction due to the ALS, I had recommended repeatedly that he switches from CPAP, that he uses for obstructive sleep apnea, to BiPAP.  He " was delivered the BiPAP machine but could not tolerate it because it was too tight on his face, and he ended up returning it.  He applies the CPAP rather loosely, and there may be air leak.  The issue is that his hands are paralyzed and he cannot adjust the mask when it makes him uncomfortable at night.    He denies cramping or significant pain in upper or lower extremities.  He does not have any shortness of breath with transfers.  In fact he can lay flat in bed for 20 minutes without major problems.    The patient has filled an advance directive, and per our prior notes a POLST as well.  I could not locate the POLST form today.  He says he would rather not get resuscitated or intubated in a terminal event.  As of the last visit 3 months ago, he was unsure about gastrostomy placement.    He is on riluzole 50 mg twice daily, tolerating it well.  Regarding Relyvrio, we offered it to him a few months ago, but after thinking it again, he decided not to take it.  He was not sure it is worth the side effect of diarrhea, and he was not so convinced about the drug's efficacy, after a very extensive discussion we had about it 3 months ago.  He believes that this will be his last year with the disease, and he is not sure how beneficial the drug would be, since he would be starting it 5 years after the symptoms began, and not early during the disease course, as happened with the participants in the phase II trial, that led to the drug's approval ([facility] was limited to individuals with disease duration of less than 18 months)       Otherwise he does not have any new issues.    BP 93/59   Pulse 63   Resp 16   Wt 68 kg (150 lb)   SpO2 95%   BMI 24.21 kg/m          Latest Ref Rng & Units 5/11/2023     9:36 AM   PFT   FVC L 1.53  P   FEV1 L 1.32  P   FVC% % 46  P   FEV1% % 51  P      P Preliminary result     Neuro exam was not repeated.    In summary, Keyon has progressive ALS which is advanced at this  point.  We discussed a number of practical issues.  He should have PT at his nursing home instruct the nursing assistants to perform range of motion exercise at least twice daily for his arms and legs, to prevent contractures. He should also engage the respiratory therapist, to see if they can make the BiPAP mask fit better on his face and be more comfortable.  I once again explained to him that CPAP is definitely not the appropriate mode of ventilation at this point, and in fact CPAP in advanced ALS may worsen atelectasis and respiratory outcomes.    The patient is not convinced he wants to proceed with gastrostomy placement, therefore we will defer it.  He will continue riluzole, but not Relyvrio or Radicava at this point.  He has addressed his advanced directives.    The family had several questions about the etiology of ALS, including genetic causes.  Keyon did have genetic testing in December 2021 for ALS, but only SOD1 gene was sequenced; the results were negative.  Given his family's concerns, we may perform additional NextGen sequencing for other ALS genes, and testing for [id number] repeat expansion.  Our genetic counselor will discuss this with the patient and the family today.  In addition, with Keyon's permission, which was expressed verbally, as he is unable to sign due to hand weakness, and witnessed by myself and another witness in the clinic, a consent form was completed allowing his brother Dane and his sister Marleny to have access to his progress notes from the neurology clinic, compliant with HIPAA rules.     The patient's wife also has questions about tissue donation for autopsy, which I will address in a separate directworx secure message to her.     Follow-up in 3 months in clinic, sooner if needed.  Billing MDM level 4 (moderate) based on: #1 problems assessed: One chronic disorder with progression, exacerbation, or side effects of treatment (ALS), and #2 risk: Prescription drug management.                                                                                                   Again, thank you for allowing me to participate in the care of your patient.      Sincerely,    Taras Ortiz MD

## 2023-05-11 NOTE — PROGRESS NOTES
"John Dunphy was seen for a genetic counseling appointment at the request of Dr. Ortiz today given his diagnosis of ALS. He was accompanied by his sister Marleny and brother Juanito.    Pertinent Medical History: Keyon is a 72 year old male with a history of ALS. Previous genetic testing includes SOD1 single gene sequencing and deletion duplication analysis which was negative or normal. See Dr. Ordaz's note for additional details.     Family History: A three generation pedigree was obtained at Keyon's previous appointment and scanned into the EMR. This family history is by patient report only and has not been verified with medical records except where noted. The following information is significant:     Keyon does not have children    Keyon has 2 brothers and 1 sister. Limited information is available for Keyon's brother (age 69) and his son. Keyon's brother (age 67) has a history of double vision and heart problems requiring stent placement. He has 3 healthy sons. Keyon sister (age 55) has a history of a concussion after a fall and may have some memory difficulties. She has 2 sons who are alive and well.    Keyon's father  at age 87 due to Alzheimer's disease that was diagnosed many years ago. He has 1 brother who does not have neurologic concerns. Limited information is available regarding Keyon's paternal cousins. Keyon's paternal grandfather  in his 80s due to natural causes. He had a history of \"shaky limbs\" during his lifetime. Keyon's paternal grandmother  young. She did not have a history of neurologic concerns.    Keyon's mother  at age 71 due to cancer that started in her abdomen and metastasized throughout her body. She did not have siblings. Keyon's maternal grandfather  young and did not have a history of neurologic concerns. Keyon's maternal grandmother  due to polio and did not have a history of neurologic concerns.    Ancestry is [place]e and [place]ian. Family history is negative for " "ALS and Parkinson's disease. Consanguinity was denied.    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 Based on the progression of Keyon's symptoms, he has been given a new diagnosis of ALS. This warrants expansion of his genetic testing. The following information was reviewed:    Amyotrophic lateral sclerosis (ALS) is a condition that affects the motor neurons.  Motor neurons are responsible for sending the necessary signals to the muscles, to allow for movement and speech. In ALS, these motor neurons break down and eventually lead to loss of speech and paralysis. Some individuals with ALS may also experience neuropsychological symptoms such as cognitive and/or behavioral dysfunction or frontotemporal dementia (FTD) in severe cases. The progression of this condition is approximately three to five years, although it can vary with age of onset and between and within families. Historically, ALS has been categorized into \"familial ALS\" when an individual has two or more close relatives with ALS and \"sporadic ALS\" when an individual with ALS has no family history of the condition. ALS is not generally thought of as a genetic or inherited condition; however recently several genetic factors have been found to be either causative or contributory. When a family history of ALS is identified then we have a strong suspicion that there is a causative genetic factor or genetic change (mutation) that predisposes members of the family to ALS. However, as more research and genetic testing has become available, individuals with seemingly sporadic ALS can have a gene mutation identified. This may be due to a new (de cuauhtemoc) mutation in that individual or reduced penetrance. Therefore, a negative family history cannot rule out a possible genetic form of ALS. About 10-15% of individuals with ALS are " "thought to have \"genetic ALS\" meaning a causative gene mutation has been identified.     There are several genes that have been found to be associated with ALS and/or FTD. The most common genetic cause of ALS is a hexanucleotide repeat expansion (GGGGCC) in the A5rog28 gene. The number of hexanucleotide repeats in the M1ocy77 gene ranges from 2 to >4000. The precise cutoff of between normal and pathogenic (disease causing) is complicated by multiple factors, but generally <25 is considered normal and >60 is considered pathogenic. Repeat expansions in the E0vph50 gene account for approximately 39-45% of familial cases of ALS and about 3-7% of sporadic ALS. Other genes known to cause ALS include: SOD1, FUS, TARDBP, ANG, OPTN, FIG4, VAPB, UBQLN2, SETX, NEK1, VCP, ALS2, TDP43 (CHCHD10, DCTN1, MATR3, PFN1, TUBA4A, UNC13A, ATXN2).    Genetic forms of ALS are typically inherited in an autosomal dominant pattern, meaning a change on one copy of the gene is sufficient to predispose an individual to the condition. Someone who possesses an ALS gene mutation would have a 1 in 2 (50%) chance of passing the gene mutation associated with ALS on to their children. It is import to know that not all people who inherit an ALS gene mutation will develop ALS. This is called reduced penetrance. For example, the penetrance or percent of individuals who develop ALS who have a mutation in SOD1 ranges from 50% to 90% by age 70, depending on the specific genetic mutation. It is assumed that other genes will also show reduced penetrance. We discussed that if the gene associated with ALS is NOT passed on; that child is NOT at an increased risk to develop symptoms because of this genetic change and CANNOT pass it on to their children. The only way to know if the genetic change is passed on is by genetic testing.    We discussed the availability of genetic testing for ALS. The gold standard of ALS genetic testing is to begin testing the C7hoz29 " gene to determine if an individual has a normal number of hexanucleotide repeats (<25) or an expanded number of hexanucleotide repeats (>60). If that testing identifies normal repeats on both copies of J4jlk03, then testing can be expanded to a multi-gene panel to include the other genes associated with ALS. We went on to discuss the details, limitations, and possible outcomes of these multi-gene panels. In particular, we discussed that there are three possible results:    Negative: meaning normal or no mutations are identified in the genes that were tested/sequenced    Positive: meaning a mutation that is known to be associated with a particular set of symptoms is identified    Variant of uncertain significance (VUS): meaning a change in the DNA sequence of a particular gene was seen but there is not enough information or data yet to know if it explains the symptoms. If a VUS is identified, testing of other relatives may be helpful to provide clarification.  In most cases, identification of a VUS does not confirm a diagnosis and does not result in any clinically actionable recommendations.    Even with the growing numbers of genes that have been identified, current clinically available genetic testing identifies a causative mutation in less than 40% families affected with familial ALS. Therefore, most of the time we cannot identify the genetic cause in individuals with ALS. If the results of genetic testing are negative, this cannot rule out the possibility that there may be an underlying genetic cause or component to an individual's ALS, as it is likely that the genetic cause of all forms of ALS have yet to be discovered. DNA banking is the process in which we can store an individual's DNA almost indefinitely to test at a later time when new genetic tests are available. Additionally, the DNA could be used in research with the participant's consent. This can be done for any genetic condition. It can also be done  for conditions which are not thought to be genetic or where there isn't a known family history. Saving the sample may allow for testing as advances are made in diagnosis and treatment.  DNA banking is available through many laboratories including a laboratory called [facility]. Additional information is available upon request.    Pre-symptomatic testing is an option ONLY if a familial mutation is identified in and affected individual. Pre-symptomatic testing is an individual choice and is .  Some people prefer to know so they can be aware of any symptoms and seek appropriate medical care and for family planning.  Other people find it difficult to live in anticipation of the condition and prefer not to know.  Future insurability is often a question for individuals undergoing pre-symptomatic testing.  While there are federal and state laws that protect people from health insurance discrimination, there is not as much regulation on life and disability insurance.  There are many things to consider when contemplating pre-symptomatic testing genetic counseling as well taking to a psychologist can help in the decision making process. As treatments for ALS are developed pre-symptomatic testing may be an avenue to detect those individuals at risk to develop ALS and provide preventative treatment.    We discussed the option to complete this testing through insurance pending approval or a sponsored research program. Risks, benefits and limitations of these options were reviewed. Keyon expressed an excellent understanding of this information and decided to proceed with the [facility] sponsored ALS panel with O1xsw66 and ATXN2 repeat analysis.    Plan:  1. Sponsored ALS panel with G3kob35 and ATXN2 repeat analysis at [place] [facility]. Keyon has requested that I call his sister Marleny at [phone] with genetic test results.  2. Return pending results of above testing.   3. Contact information was provided  should any questions arise in the future.     Gwen García MS [facility]  Genetic Counselor  Division of Genetics and Metabolism  (p) [phone]  (f) [phone]     Total time spent in consultation with the family was approximately 30 minutes     Cc: No Letter

## 2023-05-11 NOTE — Clinical Note
"2023       RE: John C Dunphy  [address] [facility] [address]     Dear Colleague,    Thank you for referring your patient, John C Dunphy, to the [facility]. Please see a copy of my visit note below.    John Dunphy was seen for a genetic counseling appointment at the request of Dr. Ortiz today given his diagnosis of ALS. He was accompanied by his sister Marleny and brother Juanito.    Pertinent Medical History: Keyon is a 72 year old male with a history of ALS. Previous genetic testing includes SOD1 single gene sequencing and deletion duplication analysis which was negative or normal. See Dr. Ordaz's note for additional details.     Family History: A three generation pedigree was obtained at Keyon's previous appointment and scanned into the EMR. This family history is by patient report only and has not been verified with medical records except where noted. The following information is significant:     Keyon does not have children    Keyon has 2 brothers and 1 sister. Limited information is available for Keyon's brother (age 69) and his son. Keyon's brother (age 67) has a history of double vision and heart problems requiring stent placement. He has 3 healthy sons. Keyon sister (age 55) has a history of a concussion after a fall and may have some memory difficulties. She has 2 sons who are alive and well.    Keyon's father  at age 87 due to Alzheimer's disease that was diagnosed many years ago. He has 1 brother who does not have neurologic concerns. Limited information is available regarding Keyon's paternal cousins. Keyon's paternal grandfather  in his 80s due to natural causes. He had a history of \"shaky limbs\" during his lifetime. Keyon's paternal grandmother  young. She did not have a history of neurologic concerns.    Keyon's mother  at age 71 due to cancer that started in her abdomen and " "metastasized throughout her body. She did not have siblings. Keyon's maternal grandfather  young and did not have a history of neurologic concerns. Keyon's maternal grandmother  due to polio and did not have a history of neurologic concerns.    Ancestry is [place] and [place]ian. Family history is negative for ALS and Parkinson's disease. Consanguinity was denied.    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 Based on the progression of Keyon's symptoms, he has been given a new diagnosis of ALS. This warrants expansion of his genetic testing. The following information was reviewed:    Amyotrophic lateral sclerosis (ALS) is a condition that affects the motor neurons.  Motor neurons are responsible for sending the necessary signals to the muscles, to allow for movement and speech. In ALS, these motor neurons break down and eventually lead to loss of speech and paralysis. Some individuals with ALS may also experience neuropsychological symptoms such as cognitive and/or behavioral dysfunction or frontotemporal dementia (FTD) in severe cases. The progression of this condition is approximately three to five years, although it can vary with age of onset and between and within families. Historically, ALS has been categorized into \"familial ALS\" when an individual has two or more close relatives with ALS and \"sporadic ALS\" when an individual with ALS has no family history of the condition. ALS is not generally thought of as a genetic or inherited condition; however recently several genetic factors have been found to be either causative or contributory. When a family history of ALS is identified then we have a strong suspicion that there is a causative genetic factor or genetic change (mutation) that predisposes members of the family to ALS. However, as more research and genetic testing " "has become available, individuals with seemingly sporadic ALS can have a gene mutation identified. This may be due to a new (de cuauhtemoc) mutation in that individual or reduced penetrance. Therefore, a negative family history cannot rule out a possible genetic form of ALS. About 10-15% of individuals with ALS are thought to have \"genetic ALS\" meaning a causative gene mutation has been identified.     There are several genes that have been found to be associated with ALS and/or FTD. The most common genetic cause of ALS is a hexanucleotide repeat expansion (GGGGCC) in the V6exh66 gene. The number of hexanucleotide repeats in the J9dam74 gene ranges from 2 to >4000. The precise cutoff of between normal and pathogenic (disease causing) is complicated by multiple factors, but generally <25 is considered normal and >60 is considered pathogenic. Repeat expansions in the L3lro92 gene account for approximately 39-45% of familial cases of ALS and about 3-7% of sporadic ALS. Other genes known to cause ALS include: SOD1, FUS, TARDBP, ANG, OPTN, FIG4, VAPB, UBQLN2, SETX, NEK1, VCP, ALS2, TDP43 (CHCHD10, DCTN1, MATR3, PFN1, TUBA4A, UNC13A, ATXN2).    Genetic forms of ALS are typically inherited in an autosomal dominant pattern, meaning a change on one copy of the gene is sufficient to predispose an individual to the condition. Someone who possesses an ALS gene mutation would have a 1 in 2 (50%) chance of passing the gene mutation associated with ALS on to their children. It is import to know that not all people who inherit an ALS gene mutation will develop ALS. This is called reduced penetrance. For example, the penetrance or percent of individuals who develop ALS who have a mutation in SOD1 ranges from 50% to 90% by age 70, depending on the specific genetic mutation. It is assumed that other genes will also show reduced penetrance. We discussed that if the gene associated with ALS is NOT passed on; that child is NOT at an increased " risk to develop symptoms because of this genetic change and CANNOT pass it on to their children. The only way to know if the genetic change is passed on is by genetic testing.    We discussed the availability of genetic testing for ALS. The gold standard of ALS genetic testing is to begin testing the J2uag70 gene to determine if an individual has a normal number of hexanucleotide repeats (<25) or an expanded number of hexanucleotide repeats (>60). If that testing identifies normal repeats on both copies of P3qci14, then testing can be expanded to a multi-gene panel to include the other genes associated with ALS. We went on to discuss the details, limitations, and possible outcomes of these multi-gene panels. In particular, we discussed that there are three possible results:    Negative: meaning normal or no mutations are identified in the genes that were tested/sequenced    Positive: meaning a mutation that is known to be associated with a particular set of symptoms is identified    Variant of uncertain significance (VUS): meaning a change in the DNA sequence of a particular gene was seen but there is not enough information or data yet to know if it explains the symptoms. If a VUS is identified, testing of other relatives may be helpful to provide clarification.  In most cases, identification of a VUS does not confirm a diagnosis and does not result in any clinically actionable recommendations.    Even with the growing numbers of genes that have been identified, current clinically available genetic testing identifies a causative mutation in less than 40% families affected with familial ALS. Therefore, most of the time we cannot identify the genetic cause in individuals with ALS. If the results of genetic testing are negative, this cannot rule out the possibility that there may be an underlying genetic cause or component to an individual's ALS, as it is likely that the genetic cause of all forms of ALS have yet to be  discovered. DNA banking is the process in which we can store an individual's DNA almost indefinitely to test at a later time when new genetic tests are available. Additionally, the DNA could be used in research with the participant's consent. This can be done for any genetic condition. It can also be done for conditions which are not thought to be genetic or where there isn't a known family history. Saving the sample may allow for testing as advances are made in diagnosis and treatment.  DNA banking is available through many laboratories including a laboratory called [facility]. Additional information is available upon request.    Pre-symptomatic testing is an option ONLY if a familial mutation is identified in and affected individual. Pre-symptomatic testing is an individual choice and is .  Some people prefer to know so they can be aware of any symptoms and seek appropriate medical care and for family planning.  Other people find it difficult to live in anticipation of the condition and prefer not to know.  Future insurability is often a question for individuals undergoing pre-symptomatic testing.  While there are federal and state laws that protect people from health insurance discrimination, there is not as much regulation on life and disability insurance.  There are many things to consider when contemplating pre-symptomatic testing genetic counseling as well taking to a psychologist can help in the decision making process. As treatments for ALS are developed pre-symptomatic testing may be an avenue to detect those individuals at risk to develop ALS and provide preventative treatment.    We discussed the option to complete this testing through insurance pending approval or a sponsored research program. Risks, benefits and limitations of these options were reviewed. Keyon expressed an excellent understanding of this information and decided to proceed with the [facility] sponsored ALS panel with  Z1hvp77 and ATXN2 repeat analysis.    Plan:  1. Sponsored ALS panel with L9ain20 and ATXN2 repeat analysis at [facility]. Keyon has requested that I call his sister Marleny at [phone] with genetic test results.  2. Return pending results of above testing.   3. Contact information was provided should any questions arise in the future.     Gwen García [facility]  Genetic Counselor  Division of Genetics and Metabolism  (p) [phone]  (f) [phone]     Total time spent in consultation with the family was approximately 30 minutes     Cc: No Letter        Again, thank you for allowing me to participate in the care of your patient.      Sincerely,    Gwen García, GC

## 2023-05-11 NOTE — PROGRESS NOTES
"Date of Service: 05/11/2023    I had the pleasure to see John Dunphy at the [facility] today. Keyon was escorted by his 2 siblings (brother Dane and sister Marleny), and his caregiver at the nursing home.  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  Gradually his disease progressed, and now he clearly meets criteria for widespread ALS as he has weakness of his bulbar region, lower extremities and trunk as well.  At this point he does not get much out of his wheelchair and needs to be transferred by the nursing home staff.  His upper extremity function is essentially 0 (plegic).  He cannot hold a pen or sign.  He is acknowledging gradually more dysphagia from the last visit.  He had a barium swallow study at his nursing home, which led to change of his diet to thickened liquids.  He has trouble with small particles of food which he chokes on not rarely.  He does have a speech therapist, and is following her instructions at the nursing home.  He has felt that his lungs are more \"moist\" after eating, which could mean that he is exhibiting silent aspiration at times.  He has minor drooling but he does not think it is a major problem.  He denies pseudobulbar affect or issues with thick phlegm.  He is not dysarthric.  He has lost further weight from the last visit, unclear how much.    Because his pulmonary function tests previously indicated ventilatory dysfunction due to the ALS, I had recommended repeatedly that he switches from CPAP, that he uses for obstructive sleep apnea, to BiPAP.  He was delivered the BiPAP machine but could not tolerate it because it was too tight on his face, and he ended up returning it.  He applies the CPAP rather loosely, and there may be air leak.  The issue is that his hands are paralyzed and he cannot adjust the mask when it makes him uncomfortable at night.    He " denies cramping or significant pain in upper or lower extremities.  He does not have any shortness of breath with transfers.  In fact he can lay flat in bed for 20 minutes without major problems.    The patient has filled an advance directive, and per our prior notes a POLST as well.  I could not locate the POLST form today.  He says he would rather not get resuscitated or intubated in a terminal event.  As of the last visit 3 months ago, he was unsure about gastrostomy placement.    He is on riluzole 50 mg twice daily, tolerating it well.  Regarding Relyvrio, we offered it to him a few months ago, but after thinking it again, he decided not to take it.  He was not sure it is worth the side effect of diarrhea, and he was not so convinced about the drug's efficacy, after a very extensive discussion we had about it 3 months ago.  He believes that this will be his last year with the disease, and he is not sure how beneficial the drug would be, since he would be starting it 5 years after the symptoms began, and not early during the disease course, as happened with the participants in the phase II trial, that led to the drug's approval (enrollment in the [facility] trial was limited to individuals with disease duration of less than 18 months)       Otherwise he does not have any new issues.    BP 93/59   Pulse 63   Resp 16   Wt 68 kg (150 lb)   SpO2 95%   BMI 24.21 kg/m          Latest Ref Rng & Units 5/11/2023     9:36 AM   PFT   FVC L 1.53  P   FEV1 L 1.32  P   FVC% % 46  P   FEV1% % 51  P      P Preliminary result     Neuro exam was not repeated.    In summary, Keyon has progressive ALS which is advanced at this point.  We discussed a number of practical issues.  He should have PT at his nursing home instruct the nursing assistants to perform range of motion exercise at least twice daily for his arms and legs, to prevent contractures. He should also engage the respiratory therapist, to see if they can make the BiPAP  mask fit better on his face and be more comfortable.  I once again explained to him that CPAP is definitely not the appropriate mode of ventilation at this point, and in fact CPAP in advanced ALS may worsen atelectasis and respiratory outcomes.    The patient is not convinced he wants to proceed with gastrostomy placement, therefore we will defer it.  He will continue riluzole, but not Relyvrio or Radicava at this point.  He has addressed his advanced directives.    The family had several questions about the etiology of ALS, including genetic causes.  Keyon did have genetic testing in December 2021 for ALS, but only SOD1 gene was sequenced; the results were negative.  Given his family's concerns, we may perform additional NextGen sequencing for other ALS genes, and testing for [id number] repeat expansion.  Our genetic counselor will discuss this with the patient and the family today.  In addition, with Keyon's permission, which was expressed verbally, as he is unable to sign due to hand weakness, and witnessed by myself and another witness in the clinic, a consent form was completed allowing his brother Dane and his sister Marleny to have access to his progress notes from the neurology clinic, compliant with HIPAA rules.     The patient's wife also has questions about tissue donation for autopsy, which I will address in a separate [employer] secure message to her.     Follow-up in 3 months in clinic, sooner if needed.  Billing MDM level 4 (moderate) based on: #1 problems assessed: One chronic disorder with progression, exacerbation, or side effects of treatment (ALS), and #2 risk: Prescription drug management.    Sincerely,    Taras Ortiz MD, FAAN

## 2023-06-02 NOTE — TELEPHONE ENCOUNTER
Contacted Marleny Coburn at John Dunphy's request to review the results of his ALS panel genetic testing. There was no answer and I was unable to leave a voicemail.    Contacted Marleny again and reviewed the following:    Our genes are sequences of letters that provide instructions that help our body grow, develop and function. Sometimes a change occurs in one of our genes that causes the body to be unable to read these instructions. This results in a genetic condition.    ALS genetic testing consists of two parts. The first is testing to determine the number of repeats in genes called [id number] and ATXN2 and the second is sequencing and deletion/duplication analysis of a panel of genes related to ALS to determine if changes are present.     [facility] genetic testing looked at a gene called [id number] to determine if their is an expanded number of repeats present. This testing was negative or normal. This test identified 2 and 6 repeats (less that 24 is considered normal).     Keyon's genetic testing looked at a gene called ATXN2 to determine if their is an expanded number of repeats present. This testing was negative or normal. This test identified 22 and 22 repeats (less that 31 is considered normal).     Keyon's ALS panel looked for changes in multiple genes related to ALS. This testing was also negative or normal.     Negative genetic testing does not rule out or change a clinical diagnosis of ALS. It is likely that there are single gene causes of ALS that have not been identified and are not tested for with today's technology. Information related to DNA banking was discussed. Marleny requested additional information regarding this service.    I passed on the following questions to Dr Carson, Dr Ortiz, Marleny Stewart and Kylah Peck on behalf of Marleny:  -She is still waiting to hear back about tissue donation after death and would like someone to call and either provide details or let her know that it  isn't an option for Mr Dunphy.  -Mr Dunphy had a significant choking episode at the nursing home on mashed potatoes. Mr Dunphy does not want a feeding tube at this time.  -Marleny would like someone to share the results of Mr Dunphy's PFTs that they took at his last appointment.  -Marleny would like to be connected with someone who can help her get set up with a MyChart for Mr Dunphy's account if possible.    All of Marleny's genetics related questions were addressed. She was encouraged to contact me with additional questions in the future.    Gwen García MS Merg[facility] with [facility]  Genetic Counselor  Division of Genetics and Metabolism  (p) [phone]  (f) [phone]

## 2023-09-13 NOTE — TELEPHONE ENCOUNTER
[facility] Call Center    Phone Message    May a detailed message be left on voicemail: yes     Reason for Call: Nurse from [facility] called to cancel all appointment for 9/14/23 as patient has shingles.  Nurse requests to reschedule both appointment.  Writer unable to schedule both appointments - send message to do so.      Action Taken: [facility] [place] [facility]    Travel Screening: Not Applicable

## 2023-10-23 ENCOUNTER — TELEPHONE (OUTPATIENT)
Dept: NEUROLOGY | Facility: CLINIC | Age: 73
End: 2023-10-23
Payer: COMMERCIAL

## 2023-10-23 NOTE — TELEPHONE ENCOUNTER
Health Call Center    Phone Message    May a detailed message be left on voicemail: yes     Reason for Call: Patient  on Saturday at [facility].  Marleny his sister calling to say she wants help donating his body.  Please call Marleny  [phone]    Marleny had no other information to assist with death form.      Action Taken: [facility]    Travel Screening: Not Applicable